# Patient Record
Sex: MALE | Race: WHITE | NOT HISPANIC OR LATINO | Employment: OTHER | URBAN - METROPOLITAN AREA
[De-identification: names, ages, dates, MRNs, and addresses within clinical notes are randomized per-mention and may not be internally consistent; named-entity substitution may affect disease eponyms.]

---

## 2023-04-21 DIAGNOSIS — F41.9 ANXIETY: ICD-10-CM

## 2023-04-21 DIAGNOSIS — E78.5 HYPERLIPIDEMIA, UNSPECIFIED HYPERLIPIDEMIA TYPE: ICD-10-CM

## 2023-04-21 DIAGNOSIS — I10 HYPERTENSION, UNSPECIFIED TYPE: ICD-10-CM

## 2023-04-21 DIAGNOSIS — T78.40XS ALLERGY, SEQUELA: ICD-10-CM

## 2023-04-21 RX ORDER — ESCITALOPRAM OXALATE 20 MG/1
20 TABLET ORAL DAILY
COMMUNITY
End: 2023-04-21 | Stop reason: SDUPTHER

## 2023-04-21 RX ORDER — ATENOLOL 25 MG/1
1 TABLET ORAL DAILY
COMMUNITY
Start: 2015-02-25 | End: 2023-04-21 | Stop reason: SDUPTHER

## 2023-04-21 RX ORDER — ATENOLOL 25 MG/1
25 TABLET ORAL DAILY
Qty: 90 TABLET | Refills: 3 | Status: SHIPPED | OUTPATIENT
Start: 2023-04-21 | End: 2024-02-29 | Stop reason: SDUPTHER

## 2023-04-21 RX ORDER — AMLODIPINE BESYLATE 10 MG/1
10 TABLET ORAL DAILY
COMMUNITY
End: 2023-04-21 | Stop reason: SDUPTHER

## 2023-04-21 RX ORDER — MONTELUKAST SODIUM 10 MG/1
10 TABLET ORAL DAILY
Qty: 90 TABLET | Refills: 3 | Status: SHIPPED | OUTPATIENT
Start: 2023-04-21 | End: 2024-02-29 | Stop reason: SDUPTHER

## 2023-04-21 RX ORDER — TRAZODONE HYDROCHLORIDE 100 MG/1
100 TABLET ORAL NIGHTLY
Qty: 90 TABLET | Refills: 3 | Status: SHIPPED | OUTPATIENT
Start: 2023-04-21 | End: 2024-02-29 | Stop reason: SDUPTHER

## 2023-04-21 RX ORDER — ATORVASTATIN CALCIUM 40 MG/1
40 TABLET, FILM COATED ORAL DAILY
COMMUNITY
End: 2023-04-21 | Stop reason: SDUPTHER

## 2023-04-21 RX ORDER — TRAZODONE HYDROCHLORIDE 100 MG/1
100 TABLET ORAL NIGHTLY
COMMUNITY
End: 2023-04-21 | Stop reason: SDUPTHER

## 2023-04-21 RX ORDER — ESCITALOPRAM OXALATE 20 MG/1
20 TABLET ORAL DAILY
Qty: 90 TABLET | Refills: 3 | Status: SHIPPED | OUTPATIENT
Start: 2023-04-21 | End: 2024-02-29 | Stop reason: SDUPTHER

## 2023-04-21 RX ORDER — MONTELUKAST SODIUM 10 MG/1
10 TABLET ORAL DAILY
COMMUNITY
End: 2023-04-21 | Stop reason: SDUPTHER

## 2023-04-21 RX ORDER — ATORVASTATIN CALCIUM 40 MG/1
40 TABLET, FILM COATED ORAL DAILY
Qty: 90 TABLET | Refills: 3 | Status: SHIPPED | OUTPATIENT
Start: 2023-04-21 | End: 2024-04-20

## 2023-04-21 RX ORDER — AMLODIPINE BESYLATE 10 MG/1
10 TABLET ORAL DAILY
Qty: 90 TABLET | Refills: 3 | Status: SHIPPED | OUTPATIENT
Start: 2023-04-21 | End: 2024-02-29 | Stop reason: SDUPTHER

## 2023-05-02 ENCOUNTER — TELEPHONE (OUTPATIENT)
Dept: PRIMARY CARE | Facility: CLINIC | Age: 73
End: 2023-05-02
Payer: MEDICARE

## 2023-05-02 NOTE — TELEPHONE ENCOUNTER
Pt left a msg stating that a new script for his Losartan hydrochlorothiazide needs to be sent to [facility].

## 2023-05-03 DIAGNOSIS — I10 HYPERTENSION, UNSPECIFIED TYPE: ICD-10-CM

## 2023-05-03 RX ORDER — LOSARTAN POTASSIUM AND HYDROCHLOROTHIAZIDE 25; 100 MG/1; MG/1
1 TABLET ORAL DAILY
Qty: 30 TABLET | Refills: 11 | Status: SHIPPED | OUTPATIENT
Start: 2023-05-03 | End: 2023-08-11 | Stop reason: DRUGHIGH

## 2023-05-03 RX ORDER — LOSARTAN POTASSIUM AND HYDROCHLOROTHIAZIDE 25; 100 MG/1; MG/1
1 TABLET ORAL DAILY
COMMUNITY
End: 2023-05-03 | Stop reason: SDUPTHER

## 2023-05-11 LAB
C REACTIVE PROTEIN (MG/L) IN SER/PLAS BY HIGH SENSIT: 4.4 MG/L
SEDIMENTATION RATE, ERYTHROCYTE: 2 MM/H (ref 0–20)

## 2023-07-18 LAB — URINE CULTURE: NO GROWTH

## 2023-07-21 LAB
CREATININE (MG/DL) IN SER/PLAS: 1.36 MG/DL (ref 0.5–1.3)
GFR MALE: 55 ML/MIN/1.73M2
PROSTATE SPECIFIC AG (NG/ML) IN SER/PLAS: 3.82 NG/ML (ref 0–4)
UREA NITROGEN (MG/DL) IN SER/PLAS: 27 MG/DL (ref 6–23)

## 2023-08-11 ENCOUNTER — OFFICE VISIT (OUTPATIENT)
Dept: PRIMARY CARE | Facility: CLINIC | Age: 73
End: 2023-08-11
Payer: MEDICARE

## 2023-08-11 ENCOUNTER — LAB (OUTPATIENT)
Dept: LAB | Facility: LAB | Age: 73
End: 2023-08-11
Payer: MEDICARE

## 2023-08-11 VITALS
SYSTOLIC BLOOD PRESSURE: 118 MMHG | WEIGHT: 238.8 LBS | TEMPERATURE: 97.6 F | DIASTOLIC BLOOD PRESSURE: 74 MMHG | BODY MASS INDEX: 29.85 KG/M2

## 2023-08-11 DIAGNOSIS — E55.9 MILD VITAMIN D DEFICIENCY: ICD-10-CM

## 2023-08-11 DIAGNOSIS — N18.31 STAGE 3A CHRONIC KIDNEY DISEASE (MULTI): ICD-10-CM

## 2023-08-11 DIAGNOSIS — E78.5 HYPERLIPIDEMIA, UNSPECIFIED HYPERLIPIDEMIA TYPE: ICD-10-CM

## 2023-08-11 DIAGNOSIS — K62.5 RECTAL BLEEDING: ICD-10-CM

## 2023-08-11 DIAGNOSIS — R73.01 IMPAIRED FASTING BLOOD SUGAR: ICD-10-CM

## 2023-08-11 DIAGNOSIS — M25.559 HIP PAIN, UNSPECIFIED LATERALITY: Primary | ICD-10-CM

## 2023-08-11 DIAGNOSIS — Z00.00 ROUTINE CHECK-UP: ICD-10-CM

## 2023-08-11 PROBLEM — I10 HYPERTENSION: Status: ACTIVE | Noted: 2017-12-21

## 2023-08-11 PROBLEM — Z98.890 S/P ROTATOR CUFF REPAIR: Status: ACTIVE | Noted: 2023-08-11

## 2023-08-11 PROBLEM — K43.9 VENTRAL HERNIA WITHOUT OBSTRUCTION OR GANGRENE: Status: ACTIVE | Noted: 2023-08-11

## 2023-08-11 PROBLEM — J30.2 SEASONAL ALLERGIES: Status: ACTIVE | Noted: 2023-08-11

## 2023-08-11 PROBLEM — F33.9 RECURRENT MAJOR DEPRESSIVE DISORDER (CMS-HCC): Status: ACTIVE | Noted: 2023-08-11

## 2023-08-11 PROBLEM — N40.0 BPH (BENIGN PROSTATIC HYPERPLASIA): Status: ACTIVE | Noted: 2017-12-21

## 2023-08-11 PROBLEM — N13.8 BPH WITH OBSTRUCTION/LOWER URINARY TRACT SYMPTOMS: Status: ACTIVE | Noted: 2023-08-11

## 2023-08-11 PROBLEM — I25.10 CAD (CORONARY ARTERY DISEASE): Status: ACTIVE | Noted: 2017-12-21

## 2023-08-11 PROBLEM — R97.20 ELEVATED PSA: Status: ACTIVE | Noted: 2023-08-11

## 2023-08-11 PROBLEM — S72.001D FRACTURE OF UNSPECIFIED PART OF NECK OF RIGHT FEMUR, SUBSEQUENT ENCOUNTER FOR CLOSED FRACTURE WITH ROUTINE HEALING: Status: ACTIVE | Noted: 2017-12-21

## 2023-08-11 PROBLEM — R51.9 HEADACHE: Status: ACTIVE | Noted: 2023-08-11

## 2023-08-11 PROBLEM — N40.1 BPH WITH OBSTRUCTION/LOWER URINARY TRACT SYMPTOMS: Status: ACTIVE | Noted: 2023-08-11

## 2023-08-11 PROBLEM — N18.2 CHRONIC KIDNEY DISEASE, STAGE 2 (MILD): Status: ACTIVE | Noted: 2023-08-11

## 2023-08-11 PROBLEM — N18.2 CHRONIC KIDNEY DISEASE, STAGE 2 (MILD): Status: RESOLVED | Noted: 2023-08-11 | Resolved: 2023-08-11

## 2023-08-11 PROBLEM — E83.52 HYPERCALCEMIA: Status: ACTIVE | Noted: 2023-08-11

## 2023-08-11 LAB
ALANINE AMINOTRANSFERASE (SGPT) (U/L) IN SER/PLAS: 38 U/L (ref 10–52)
ANION GAP IN SER/PLAS: 14 MMOL/L (ref 10–20)
CALCIUM (MG/DL) IN SER/PLAS: 9.8 MG/DL (ref 8.6–10.3)
CARBON DIOXIDE, TOTAL (MMOL/L) IN SER/PLAS: 27 MMOL/L (ref 21–32)
CHLORIDE (MMOL/L) IN SER/PLAS: 105 MMOL/L (ref 98–107)
CREATININE (MG/DL) IN SER/PLAS: 1.29 MG/DL (ref 0.5–1.3)
ERYTHROCYTE DISTRIBUTION WIDTH (RATIO) BY AUTOMATED COUNT: 13.8 % (ref 11.5–14.5)
ERYTHROCYTE MEAN CORPUSCULAR HEMOGLOBIN CONCENTRATION (G/DL) BY AUTOMATED: 33.5 G/DL (ref 32–36)
ERYTHROCYTE MEAN CORPUSCULAR VOLUME (FL) BY AUTOMATED COUNT: 95 FL (ref 80–100)
ERYTHROCYTES (10*6/UL) IN BLOOD BY AUTOMATED COUNT: 4.6 X10E12/L (ref 4.5–5.9)
ESTIMATED AVERAGE GLUCOSE FOR HBA1C: 126 MG/DL
GFR MALE: 58 ML/MIN/1.73M2
GLUCOSE (MG/DL) IN SER/PLAS: 83 MG/DL (ref 74–99)
HEMATOCRIT (%) IN BLOOD BY AUTOMATED COUNT: 43.6 % (ref 41–52)
HEMOGLOBIN (G/DL) IN BLOOD: 14.6 G/DL (ref 13.5–17.5)
HEMOGLOBIN A1C/HEMOGLOBIN TOTAL IN BLOOD: 6 %
LEUKOCYTES (10*3/UL) IN BLOOD BY AUTOMATED COUNT: 6.1 X10E9/L (ref 4.4–11.3)
PLATELETS (10*3/UL) IN BLOOD AUTOMATED COUNT: 190 X10E9/L (ref 150–450)
POTASSIUM (MMOL/L) IN SER/PLAS: 4.1 MMOL/L (ref 3.5–5.3)
SODIUM (MMOL/L) IN SER/PLAS: 142 MMOL/L (ref 136–145)
UREA NITROGEN (MG/DL) IN SER/PLAS: 32 MG/DL (ref 6–23)

## 2023-08-11 PROCEDURE — 83036 HEMOGLOBIN GLYCOSYLATED A1C: CPT

## 2023-08-11 PROCEDURE — G0439 PPPS, SUBSEQ VISIT: HCPCS | Performed by: INTERNAL MEDICINE

## 2023-08-11 PROCEDURE — 3074F SYST BP LT 130 MM HG: CPT | Performed by: INTERNAL MEDICINE

## 2023-08-11 PROCEDURE — 3078F DIAST BP <80 MM HG: CPT | Performed by: INTERNAL MEDICINE

## 2023-08-11 PROCEDURE — 1170F FXNL STATUS ASSESSED: CPT | Performed by: INTERNAL MEDICINE

## 2023-08-11 PROCEDURE — 1036F TOBACCO NON-USER: CPT | Performed by: INTERNAL MEDICINE

## 2023-08-11 PROCEDURE — 1126F AMNT PAIN NOTED NONE PRSNT: CPT | Performed by: INTERNAL MEDICINE

## 2023-08-11 PROCEDURE — 36415 COLL VENOUS BLD VENIPUNCTURE: CPT

## 2023-08-11 PROCEDURE — 1159F MED LIST DOCD IN RCRD: CPT | Performed by: INTERNAL MEDICINE

## 2023-08-11 PROCEDURE — 84460 ALANINE AMINO (ALT) (SGPT): CPT

## 2023-08-11 PROCEDURE — 99213 OFFICE O/P EST LOW 20 MIN: CPT | Performed by: INTERNAL MEDICINE

## 2023-08-11 PROCEDURE — 85027 COMPLETE CBC AUTOMATED: CPT

## 2023-08-11 PROCEDURE — 80048 BASIC METABOLIC PNL TOTAL CA: CPT

## 2023-08-11 PROCEDURE — 1160F RVW MEDS BY RX/DR IN RCRD: CPT | Performed by: INTERNAL MEDICINE

## 2023-08-11 RX ORDER — CHOLECALCIFEROL (VITAMIN D3) 50 MCG
1 TABLET ORAL DAILY
COMMUNITY
End: 2024-02-28 | Stop reason: WASHOUT

## 2023-08-11 RX ORDER — MIRABEGRON 50 MG/1
TABLET, FILM COATED, EXTENDED RELEASE ORAL
COMMUNITY
Start: 2022-03-15

## 2023-08-11 RX ORDER — LOSARTAN POTASSIUM AND HYDROCHLOROTHIAZIDE 12.5; 1 MG/1; MG/1
1 TABLET ORAL DAILY
COMMUNITY
End: 2023-12-13 | Stop reason: WASHOUT

## 2023-08-11 RX ORDER — ASPIRIN 325 MG
TABLET, DELAYED RELEASE (ENTERIC COATED) ORAL ONCE
COMMUNITY
Start: 2015-02-25 | End: 2024-02-28 | Stop reason: WASHOUT

## 2023-08-11 RX ORDER — CARBAMAZEPINE 100 MG/1
TABLET, CHEWABLE ORAL DAILY
COMMUNITY
Start: 2022-01-27 | End: 2024-02-29 | Stop reason: SDUPTHER

## 2023-08-11 RX ORDER — ASPIRIN 81 MG/1
81 TABLET ORAL DAILY
COMMUNITY

## 2023-08-11 RX ORDER — ACETAMINOPHEN 500 MG
TABLET ORAL 2 TIMES DAILY
COMMUNITY

## 2023-08-11 ASSESSMENT — ACTIVITIES OF DAILY LIVING (ADL)
DOING_HOUSEWORK: INDEPENDENT
TAKING_MEDICATION: INDEPENDENT
GROCERY_SHOPPING: INDEPENDENT
DRESSING: INDEPENDENT
BATHING: INDEPENDENT
MANAGING_FINANCES: INDEPENDENT

## 2023-08-11 ASSESSMENT — PATIENT HEALTH QUESTIONNAIRE - PHQ9
SUM OF ALL RESPONSES TO PHQ9 QUESTIONS 1 AND 2: 0
2. FEELING DOWN, DEPRESSED OR HOPELESS: NOT AT ALL
2. FEELING DOWN, DEPRESSED OR HOPELESS: SEVERAL DAYS
1. LITTLE INTEREST OR PLEASURE IN DOING THINGS: NOT AT ALL
1. LITTLE INTEREST OR PLEASURE IN DOING THINGS: SEVERAL DAYS
SUM OF ALL RESPONSES TO PHQ9 QUESTIONS 1 AND 2: 2

## 2023-08-11 NOTE — PROGRESS NOTES
Subjective   Reason for Visit: Adebayo Puentes is an 73 y.o. male here for a Medicare Wellness visit.     Past Medical, Surgical, and Family History reviewed and updated in chart.    Reviewed all medications by prescribing practitioner or clinical pharmacist (such as prescriptions, OTCs, herbal therapies and supplements) and documented in the medical record.    HPI  Overall well   Noted a few weeks w/ red blood in water/on tissue.  No pain.  No bleeding x ~3-4 weeks.  Last colo 2021  #1 ventral hernia-status post repair , no pain  #2 CAD- no CP  #3 HTN- no HA, CP, dizziness  #4 renal stones/BPH/cysts- no issues  #5 lipids-good. Recheck   #6 CKD stage 3 - f/u nephrology. Reviewed no NSAIDs.   #7 hearing loss- stable. f/u ENT  #8 RC- s/p surgery. doing well. f/u ortho.   #9 hip fracture- resolved.   #10 globus sensation- s/p ENT. f/u ENT  #11 fatigue- better  #12 depression- good. con't Rx.   #13 fall- balance much better, reviewed  #14 neck mass- resolved. f/u ENT PRN.   #15 insomnia/mood- good, con't rx.   #16 BPH- Follow-up urology   #17 TN- improved. s/p gamma-knife. con't lyrica, f/u neuro.  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 diet/exercise reviewed. retest ha1c.    Patient Care Team:  Mayda Carter MD as PCP - General  Mayda Carter MD as PCP - [facility] Attributed Provider     Review of Systems   All other systems reviewed and are negative.      Objective   Vitals:  /74   Temp 36.4 °C (97.6 °F)   Wt 108 kg (238 lb 12.8 oz)   BMI 29.85 kg/m²       Physical Exam  Constitutional:       Appearance: Normal appearance.   Cardiovascular:      Rate and Rhythm: Normal rate and regular rhythm.      Pulses: Normal pulses.      Heart sounds: No murmur heard.     No gallop.   Pulmonary:      Effort: Pulmonary effort is normal. No respiratory distress.      Breath sounds: Normal breath sounds. No wheezing, rhonchi or rales.   Neurological:      Mental Status: He is alert.   Psychiatric:         Mood and Affect: Mood  normal.         Behavior: Behavior normal.         Thought Content: Thought content normal.         Judgment: Judgment normal.       Lab Results   Component Value Date    WBC 7.9 02/24/2023    HGB 14.1 02/24/2023    HCT 41.1 02/24/2023     02/24/2023    CHOL 124 02/13/2023    TRIG 87 02/13/2023    HDL 41.6 02/13/2023    ALT 31 02/24/2023    AST 23 02/24/2023     02/24/2023    K 3.9 02/24/2023     02/24/2023    CREATININE 1.36 (H) 07/21/2023    BUN 27 (H) 07/21/2023    CO2 31 02/24/2023    TSH 3.32 08/18/2022    PSA 3.82 07/21/2023    INR 1.2 (H) 02/24/2023    HGBA1C 5.8 (A) 02/13/2023       Assessment/Plan   Problem List Items Addressed This Visit    None  #1 ventral hernia-status post repair doing well. f/u surgery PRN  #2 CAD- class 1 dz. neg stress last. episode of non-acrdiac CP, neg eval 2/22. Clinically stable. f//u w/ Dr Howell- qyear.  #3 HTN- good. con't rx. f/u nephrology.  #4 renal stones/BPH/cysts-stable. f/u , appt pending  #5 lipids-good. Recheck 6 mths  #6 CKD stage 3 - f/u nephrology. Reviewed no NSAIDs.   #7 hearing loss- stable. f/u ENT  #8 RC- s/p surgery. doing well. f/u ortho.   #9 hip fracture- resolved.   #10 globus sensation- s/p ENT. f/u ENT  #11 fatigue- better  #12 depression- good. con't Rx.   #13 fall- balance much better, reviewed  #14 neck mass- resolved. f/u ENT PRN.   #15 insomnia/mood- good, con't rx.   #16 BPH- Follow-up urology   #17 TN- improved. s/p gamma-knife. con't lyrica, f/u neuro.  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 diet/exercise reviewed. retest ha1c.  #19 rectal bleeding- resolved.  Suspect hemorrhoids.  Check cbc, c/s GI     last colo 9/21--> is due for colonoscopy 2026

## 2023-08-15 ENCOUNTER — TELEPHONE (OUTPATIENT)
Dept: PRIMARY CARE | Facility: CLINIC | Age: 73
End: 2023-08-15
Payer: MEDICARE

## 2023-08-15 NOTE — TELEPHONE ENCOUNTER
Pt left a msg for you stating that he mis-spoke about his Losartan hydrochlorothiazide.  The hydrochlorothiazide is not 12.5mg, it is 25mg.

## 2023-08-16 DIAGNOSIS — I10 HYPERTENSION, UNSPECIFIED TYPE: ICD-10-CM

## 2023-08-16 RX ORDER — LOSARTAN POTASSIUM AND HYDROCHLOROTHIAZIDE 25; 100 MG/1; MG/1
1 TABLET ORAL DAILY
Qty: 30 TABLET | Refills: 11 | Status: SHIPPED | OUTPATIENT
Start: 2023-08-16 | End: 2024-08-15

## 2023-08-17 ENCOUNTER — TELEPHONE (OUTPATIENT)
Dept: PRIMARY CARE | Facility: CLINIC | Age: 73
End: 2023-08-17
Payer: MEDICARE

## 2023-08-17 NOTE — TELEPHONE ENCOUNTER
Pt left a msg stating that he has not been able to get a hold of Dr. Coelho's office since Monday.  The answering service told him they don't take msg's.  He has call during office hours and is getting no one.  Doesn't know if he is on vacation or what.  He is very upset and impatient.

## 2023-09-08 ENCOUNTER — HOSPITAL ENCOUNTER (OUTPATIENT)
Dept: DATA CONVERSION | Facility: HOSPITAL | Age: 73
Discharge: HOME | End: 2023-09-08

## 2023-09-08 DIAGNOSIS — R79.1 ABNORMAL COAGULATION PROFILE: ICD-10-CM

## 2023-09-08 DIAGNOSIS — Z01.818 ENCOUNTER FOR OTHER PREPROCEDURAL EXAMINATION: ICD-10-CM

## 2023-09-08 DIAGNOSIS — R97.20 ELEVATED PROSTATE SPECIFIC ANTIGEN (PSA): ICD-10-CM

## 2023-09-08 LAB
ALBUMIN SERPL-MCNC: 4.6 GM/DL (ref 3.5–5)
ALBUMIN/GLOB SERPL: 1.5 RATIO (ref 1.5–3)
ALP BLD-CCNC: 117 U/L (ref 35–125)
ALT SERPL-CCNC: 34 U/L (ref 5–40)
ANION GAP SERPL CALCULATED.3IONS-SCNC: 11 MMOL/L (ref 0–19)
ANTICOAGULANT: NORMAL
APTT PPP: 25.1 SEC (ref 22–32.5)
AST SERPL-CCNC: 34 U/L (ref 5–40)
BASOPHILS # BLD AUTO: 0.03 K/UL (ref 0–0.22)
BASOPHILS NFR BLD AUTO: 0.4 % (ref 0–1)
BILIRUB SERPL-MCNC: 0.6 MG/DL (ref 0.1–1.2)
BILIRUB UR QL STRIP.AUTO: NEGATIVE
BUN SERPL-MCNC: 31 MG/DL (ref 8–25)
BUN/CREAT SERPL: 18.2 RATIO (ref 8–21)
CALCIUM SERPL-MCNC: 10.5 MG/DL (ref 8.5–10.4)
CHLORIDE SERPL-SCNC: 105 MMOL/L (ref 97–107)
CLARITY UR: CLEAR
CO2 SERPL-SCNC: 28 MMOL/L (ref 24–31)
COLOR UR: YELLOW
CREAT SERPL-MCNC: 1.7 MG/DL (ref 0.4–1.6)
DEPRECATED RDW RBC AUTO: 46.6 FL (ref 37–54)
DIFFERENTIAL METHOD BLD: ABNORMAL
EOSINOPHIL # BLD AUTO: 0.12 K/UL (ref 0–0.45)
EOSINOPHIL NFR BLD: 1.5 % (ref 0–3)
EPITH CASTS #/AREA UR COMP ASSIST: NORMAL /HPF
ERYTHROCYTE [DISTWIDTH] IN BLOOD BY AUTOMATED COUNT: 13.2 % (ref 11.7–15)
GFR SERPL CREATININE-BSD FRML MDRD: 42 ML/MIN/1.73 M2
GLOBULIN SER-MCNC: 3 G/DL (ref 1.9–3.7)
GLUCOSE SERPL-MCNC: 83 MG/DL (ref 65–99)
GLUCOSE UR STRIP.AUTO-MCNC: NEGATIVE MG/DL
HCT VFR BLD AUTO: 43.1 % (ref 41–50)
HGB BLD-MCNC: 14.2 GM/DL (ref 13.5–16.5)
HGB UR QL STRIP.AUTO: NORMAL /HPF (ref 0–3)
HGB UR QL: NEGATIVE
IMM GRANULOCYTES # BLD AUTO: 0.01 K/UL (ref 0–0.1)
INR PPP: 1 (ref 0.86–1.16)
KETONES UR QL STRIP.AUTO: NEGATIVE
LEUKOCYTE ESTERASE UR QL STRIP.AUTO: NEGATIVE
LYMPHOCYTES # BLD AUTO: 1.71 K/UL (ref 1.2–3.2)
LYMPHOCYTES NFR BLD MANUAL: 21.8 % (ref 20–40)
MCH RBC QN AUTO: 31.3 PG (ref 26–34)
MCHC RBC AUTO-ENTMCNC: 32.9 % (ref 31–37)
MCV RBC AUTO: 95.1 FL (ref 80–100)
MICRO URNS: NORMAL
MICROSCOPIC (UA): NORMAL
MONOCYTES # BLD AUTO: 0.81 K/UL (ref 0–0.8)
MONOCYTES NFR BLD MANUAL: 10.3 % (ref 0–8)
NEUTROPHILS # BLD AUTO: 5.15 K/UL
NEUTROPHILS # BLD AUTO: 5.15 K/UL (ref 1.8–7.7)
NEUTROPHILS.IMMATURE NFR BLD: 0.1 % (ref 0–1)
NEUTS SEG NFR BLD: 65.9 % (ref 50–70)
NITRITE UR QL STRIP.AUTO: NEGATIVE
PH UR STRIP.AUTO: 5.5 [PH] (ref 4.6–8)
PLATELET # BLD AUTO: 194 K/UL (ref 150–450)
PMV BLD AUTO: 9.5 CU (ref 7–12.6)
POTASSIUM SERPL-SCNC: 4.2 MMOL/L (ref 3.4–5.1)
PROT SERPL-MCNC: 7.6 G/DL (ref 5.9–7.9)
PROT UR STRIP.AUTO-MCNC: NEGATIVE MG/DL
PROTHROMBIN TIME: 9.9 SEC (ref 9.3–12.7)
RBC # BLD AUTO: 4.53 M/UL (ref 4.5–5.5)
SODIUM SERPL-SCNC: 144 MMOL/L (ref 133–145)
SP GR UR STRIP.AUTO: 1.01 (ref 1–1.03)
URINE CULTURE: NORMAL
UROBILINOGEN UR QL STRIP.AUTO: 0.2 MG/DL (ref 0–1)
WBC # BLD AUTO: 7.8 K/UL (ref 4.5–11)
WBC #/AREA URNS AUTO: NORMAL /HPF (ref 0–3)

## 2023-09-12 ENCOUNTER — HOSPITAL ENCOUNTER (OUTPATIENT)
Dept: DATA CONVERSION | Facility: HOSPITAL | Age: 73
Discharge: HOME | End: 2023-09-12

## 2023-09-12 DIAGNOSIS — K21.9 GASTRO-ESOPHAGEAL REFLUX DISEASE WITHOUT ESOPHAGITIS: ICD-10-CM

## 2023-09-12 DIAGNOSIS — C61 MALIGNANT NEOPLASM OF PROSTATE (MULTI): ICD-10-CM

## 2023-09-12 DIAGNOSIS — Z79.82 LONG TERM (CURRENT) USE OF ASPIRIN: ICD-10-CM

## 2023-09-12 DIAGNOSIS — R97.20 ELEVATED PROSTATE SPECIFIC ANTIGEN (PSA): ICD-10-CM

## 2023-09-12 DIAGNOSIS — I10 ESSENTIAL (PRIMARY) HYPERTENSION: ICD-10-CM

## 2023-09-27 ENCOUNTER — TELEPHONE (OUTPATIENT)
Dept: PRIMARY CARE | Facility: CLINIC | Age: 73
End: 2023-09-27
Payer: MEDICARE

## 2023-09-27 NOTE — TELEPHONE ENCOUNTER
Pt left a msg stating that he is leaving for [facility] in October, and is looking to see if he has had a yellow fever shot, He was also asking if there are any other vaccines he will need.

## 2023-10-11 DIAGNOSIS — Z00.00 ROUTINE GENERAL MEDICAL EXAMINATION AT A HEALTH CARE FACILITY: Primary | ICD-10-CM

## 2023-10-12 NOTE — TELEPHONE ENCOUNTER
Pt called again, stating they are leaving for [facility] on 10/20.  He said that his last yellow fever was 11 yrs ago, and wanted to know if he needs a booster.  He is also needing a script for the Malaria pills.  [facility] has ordered them but they will need a script.  He also wanted to know if there was anything else he needed.  He has been in contact with the health dept.

## 2023-10-13 PROBLEM — N28.1 RENAL CYST: Status: ACTIVE | Noted: 2023-10-13

## 2023-10-13 PROBLEM — K21.9 GERD (GASTROESOPHAGEAL REFLUX DISEASE): Status: ACTIVE | Noted: 2023-10-13

## 2023-10-13 PROBLEM — G50.0 RIGHT TRIGEMINAL NEURALGIA: Status: ACTIVE | Noted: 2023-10-13

## 2023-10-13 PROBLEM — R26.89 IMBALANCE: Status: ACTIVE | Noted: 2023-10-13

## 2023-10-13 PROBLEM — R26.2 DIFFICULTY IN WALKING, NOT ELSEWHERE CLASSIFIED: Status: ACTIVE | Noted: 2017-12-21

## 2023-10-13 PROBLEM — H90.3 BILATERAL SENSORINEURAL HEARING LOSS: Status: ACTIVE | Noted: 2023-10-13

## 2023-10-13 PROBLEM — K64.9 HEMORRHOIDS: Status: ACTIVE | Noted: 2023-10-13

## 2023-10-13 PROBLEM — M62.81 MUSCLE WEAKNESS (GENERALIZED): Status: ACTIVE | Noted: 2017-12-21

## 2023-10-13 PROBLEM — I10 ESSENTIAL (PRIMARY) HYPERTENSION: Status: ACTIVE | Noted: 2023-10-13

## 2023-10-13 PROBLEM — K21.9 LARYNGOPHARYNGEAL REFLUX (LPR): Status: ACTIVE | Noted: 2023-10-13

## 2023-10-13 PROBLEM — R97.20 RISING PSA LEVEL: Status: ACTIVE | Noted: 2023-10-13

## 2023-10-13 PROBLEM — G60.9 IDIOPATHIC PERIPHERAL NEUROPATHY: Status: ACTIVE | Noted: 2023-10-13

## 2023-10-13 PROBLEM — S32.401A: Status: ACTIVE | Noted: 2023-10-13

## 2023-10-13 PROBLEM — M75.121 COMPLETE TEAR OF RIGHT ROTATOR CUFF: Status: ACTIVE | Noted: 2023-10-13

## 2023-10-13 PROBLEM — R79.89 ELEVATED SERUM CREATININE: Status: ACTIVE | Noted: 2023-10-13

## 2023-10-13 PROBLEM — F32.9 MAJOR DEPRESSIVE DISORDER, SINGLE EPISODE, UNSPECIFIED: Status: ACTIVE | Noted: 2017-12-21

## 2023-10-13 PROBLEM — F32.A DEPRESSION: Status: ACTIVE | Noted: 2023-10-13

## 2023-10-13 PROBLEM — N20.0 NEPHROLITHIASIS: Status: ACTIVE | Noted: 2023-10-13

## 2023-10-13 PROBLEM — M24.411 RECURRENT SHOULDER DISLOCATION, RIGHT: Status: ACTIVE | Noted: 2023-10-13

## 2023-10-13 PROBLEM — J34.3 HYPERTROPHY OF INFERIOR NASAL TURBINATE: Status: ACTIVE | Noted: 2023-10-13

## 2023-10-13 PROBLEM — C61 PROSTATE CANCER (MULTI): Status: ACTIVE | Noted: 2023-10-13

## 2023-10-13 PROBLEM — R39.198 ABNORMAL URINARY STREAM: Status: ACTIVE | Noted: 2023-10-13

## 2023-10-13 PROBLEM — R09.A2 GLOBUS SENSATION: Status: ACTIVE | Noted: 2023-10-13

## 2023-10-13 PROBLEM — R09.89 CHRONIC THROAT CLEARING: Status: ACTIVE | Noted: 2023-10-13

## 2023-10-13 PROBLEM — G50.0 TRIGEMINUS NEURALGIA: Status: ACTIVE | Noted: 2023-10-13

## 2023-10-13 PROBLEM — N52.9 ERECTILE DYSFUNCTION: Status: ACTIVE | Noted: 2023-10-13

## 2023-10-13 PROBLEM — R22.1 NECK MASS: Status: ACTIVE | Noted: 2023-10-13

## 2023-10-13 PROBLEM — E10.42: Status: ACTIVE | Noted: 2023-10-13

## 2023-10-13 PROBLEM — H92.01 OTALGIA, RIGHT: Status: ACTIVE | Noted: 2023-10-13

## 2023-10-13 PROBLEM — R05.3 PERSISTENT COUGH: Status: ACTIVE | Noted: 2023-10-13

## 2023-10-13 PROBLEM — S32.409A ACETABULAR FRACTURE (MULTI): Status: ACTIVE | Noted: 2023-10-13

## 2023-10-13 PROBLEM — R13.10 DYSPHAGIA: Status: ACTIVE | Noted: 2023-10-13

## 2023-10-13 PROBLEM — I25.10 ATHEROSCLEROTIC HEART DISEASE OF NATIVE CORONARY ARTERY WITHOUT ANGINA PECTORIS: Status: ACTIVE | Noted: 2023-10-13

## 2023-10-13 PROBLEM — E78.2 MIXED HYPERLIPIDEMIA: Status: ACTIVE | Noted: 2023-10-13

## 2023-10-13 PROBLEM — H92.20 HEMORRHAGE OF EAR CANAL: Status: ACTIVE | Noted: 2023-10-13

## 2023-10-13 PROBLEM — R39.15 URINARY URGENCY: Status: ACTIVE | Noted: 2023-10-13

## 2023-10-13 RX ORDER — MULTIVIT-MIN/FOLIC ACID/LUTEIN 400-250MCG
1 TABLET,CHEWABLE ORAL DAILY
COMMUNITY

## 2023-10-13 RX ORDER — HYDROGEN PEROXIDE 3 %
20 SOLUTION, NON-ORAL MISCELLANEOUS DAILY
COMMUNITY

## 2023-10-13 RX ORDER — ACETAMINOPHEN 500 MG
1 TABLET ORAL
COMMUNITY

## 2023-10-13 RX ORDER — DUTASTERIDE 0.5 MG/1
0.5 CAPSULE, LIQUID FILLED ORAL DAILY
COMMUNITY
Start: 2011-05-11 | End: 2024-03-01 | Stop reason: WASHOUT

## 2023-10-13 RX ORDER — HYDROCORTISONE ACETATE 25 MG/1
25 SUPPOSITORY RECTAL NIGHTLY PRN
COMMUNITY
End: 2024-02-28 | Stop reason: WASHOUT

## 2023-10-13 RX ORDER — CEPHALEXIN 500 MG/1
500 CAPSULE ORAL
COMMUNITY
Start: 2023-09-19 | End: 2023-10-16 | Stop reason: ALTCHOICE

## 2023-10-13 RX ORDER — OSELTAMIVIR PHOSPHATE 75 MG/1
1 CAPSULE ORAL 2 TIMES DAILY
COMMUNITY
Start: 2023-02-24 | End: 2023-10-16 | Stop reason: ALTCHOICE

## 2023-10-13 NOTE — TELEPHONE ENCOUNTER
Pt dropped off a paper with the script of the Malaria pills he needs.  They are Atovaquone-Proguanil 250/100 tabs, #18, take 1 tablet by mouth daily starting 2 days before travel, during stay, and for another week after return.  Pharm is [facility].

## 2023-10-16 ENCOUNTER — OFFICE VISIT (OUTPATIENT)
Dept: GASTROENTEROLOGY | Facility: CLINIC | Age: 73
End: 2023-10-16
Payer: MEDICARE

## 2023-10-16 VITALS — BODY MASS INDEX: 33.18 KG/M2 | WEIGHT: 237 LBS | HEIGHT: 71 IN | HEART RATE: 64 BPM

## 2023-10-16 DIAGNOSIS — Z29.9 PROPHYLACTIC MEASURE: ICD-10-CM

## 2023-10-16 DIAGNOSIS — K62.5 RECTAL BLEEDING: Primary | ICD-10-CM

## 2023-10-16 PROCEDURE — 1036F TOBACCO NON-USER: CPT | Performed by: INTERNAL MEDICINE

## 2023-10-16 PROCEDURE — 3044F HG A1C LEVEL LT 7.0%: CPT | Performed by: INTERNAL MEDICINE

## 2023-10-16 PROCEDURE — 1160F RVW MEDS BY RX/DR IN RCRD: CPT | Performed by: INTERNAL MEDICINE

## 2023-10-16 PROCEDURE — 99213 OFFICE O/P EST LOW 20 MIN: CPT | Performed by: INTERNAL MEDICINE

## 2023-10-16 PROCEDURE — 1159F MED LIST DOCD IN RCRD: CPT | Performed by: INTERNAL MEDICINE

## 2023-10-16 PROCEDURE — 1126F AMNT PAIN NOTED NONE PRSNT: CPT | Performed by: INTERNAL MEDICINE

## 2023-10-16 RX ORDER — ATOVAQUONE AND PROGUANIL HYDROCHLORIDE 250; 100 MG/1; MG/1
TABLET, FILM COATED ORAL
Qty: 18 TABLET | Refills: 0 | Status: SHIPPED | OUTPATIENT
Start: 2023-10-16 | End: 2023-12-13 | Stop reason: WASHOUT

## 2023-10-16 NOTE — PROGRESS NOTES
Subjective     History of Present Illness:   Ed TATYANA Puentes is a 73 y.o. male with PMHx of rectal bleeding who presents to GI clinic for follow up.   No bleeding at all since last visit, none since May.  Fewer hard stools, now usually easy to pass.     Review of Systems  Review of Systems    Allergies  No Known Allergies    Medications  Current Outpatient Medications   Medication Instructions    amLODIPine (NORVASC) 10 mg, oral, Daily    Anucort-HC 25 mg, rectal, Nightly PRN    ascorbic acid, vitamin C, 1,000 mg ER tablet oral    aspirin 325 mg EC tablet oral, Once    aspirin 81 mg, oral, Daily    atenolol (TENORMIN) 25 mg, oral, Daily    atorvastatin (LIPITOR) 40 mg, oral, Daily    atovaquone-proguaniL (Malarone) 250-100 mg tablet Take 1 tablet daily starting 2 days prior to trip then daily until rx is gone    calcium carbonate-vitamin D3 (Caltrate with Vitamin D3) 600 mg-20 mcg (800 unit) tablet oral, 2 times daily    carBAMazepine (TEGretol) 100 mg chewable tablet oral, Daily    cholecalciferol (Vitamin D-3) 50 MCG (2000 UT) tablet 1 tablet, oral, Daily    cholecalciferol (Vitamin D3) 5,000 Units tablet 1 tablet, oral, Weekly    dutasteride (AVODART) 0.5 mg, oral, Daily    escitalopram (LEXAPRO) 20 mg, oral, Daily    esomeprazole (NEXIUM) 20 mg, oral, Daily, Do not open capsule.    losartan-hydrochlorothiazide (Hyzaar) 100-12.5 mg tablet 1 tablet, oral, Daily    losartan-hydrochlorothiazide (Hyzaar) 100-25 mg tablet 1 tablet, oral, Daily    montelukast (SINGULAIR) 10 mg, oral, Daily    mv-min-folic acid-lutein (Centrum Silver) 400-250 mcg tablet,chewable 1 tablet, oral, Daily    Myrbetriq 50 mg tablet extended release 24 hr 24 hr tablet oral    traZODone (DESYREL) 100 mg, oral, Nightly        Objective   Visit Vitals  Pulse 64    118/72  Physical Exam  Lungs clear  CV  rrr, no mrg  Abd - soft, nontender     Lab Results   Component Value Date    WBC 6.1 08/11/2023    WBC 7.9 02/24/2023    WBC 4.7 08/18/2022    HGB  14.6 08/11/2023    HGB 14.1 02/24/2023    HGB 13.7 08/18/2022    HCT 43.6 08/11/2023    HCT 41.1 02/24/2023    HCT 40.9 (L) 08/18/2022     08/11/2023     02/24/2023     08/18/2022     Lab Results   Component Value Date     08/11/2023     02/24/2023     02/13/2023    K 4.1 08/11/2023    K 3.9 02/24/2023    K 4.0 02/13/2023     08/11/2023     02/24/2023     02/13/2023    CO2 27 08/11/2023    CO2 31 02/24/2023    CO2 32 02/13/2023    BUN 32 (H) 08/11/2023    BUN 27 (H) 07/21/2023    BUN 24 (H) 02/24/2023    CREATININE 1.29 08/11/2023    CREATININE 1.36 (H) 07/21/2023    CREATININE 1.51 (H) 02/24/2023    CALCIUM 9.8 08/11/2023    CALCIUM 9.3 02/24/2023    CALCIUM 9.9 02/13/2023    PROT 7.4 02/24/2023    PROT 7.1 12/09/2022    PROT 6.9 05/19/2022    BILITOT 0.5 02/24/2023    BILITOT 0.8 12/09/2022    BILITOT 0.4 05/19/2022    ALKPHOS 107 02/24/2023    ALKPHOS 100 12/09/2022    ALKPHOS 98 05/19/2022    ALT 38 08/11/2023    ALT 31 02/24/2023    ALT 28 12/09/2022    AST 23 02/24/2023    AST 23 12/09/2022    AST 21 05/19/2022    GLUCOSE 83 08/11/2023    GLUCOSE 99 02/24/2023    GLUCOSE 83 02/13/2023    CHOL 124 02/13/2023    CHOL 124 08/18/2022    CHOL 119 04/12/2022    LDLF 65 02/13/2023    LDLF 64 08/18/2022    LDLF 64 04/12/2022    CHHDL 3.0 02/13/2023    CHHDL 3.3 08/18/2022    CHHDL 2.9 04/12/2022             Sachin Puentes is a 73 y.o. male for follow up of rectal bleeding.   No bleeding for months.   Had hemorrhoids on 2021 colonoscopy.  He will keep stools soft and easy to pass.  He will call me if bleeding recurs in any way.       Eliazar Coelho MD

## 2023-10-30 ENCOUNTER — APPOINTMENT (OUTPATIENT)
Dept: UROLOGY | Facility: CLINIC | Age: 73
End: 2023-10-30
Payer: MEDICARE

## 2023-11-06 ENCOUNTER — PROCEDURE VISIT (OUTPATIENT)
Dept: UROLOGY | Facility: CLINIC | Age: 73
End: 2023-11-06
Payer: MEDICARE

## 2023-11-06 VITALS — HEART RATE: 61 BPM | DIASTOLIC BLOOD PRESSURE: 74 MMHG | TEMPERATURE: 96.6 F | SYSTOLIC BLOOD PRESSURE: 116 MMHG

## 2023-11-06 DIAGNOSIS — N13.8 BPH WITH OBSTRUCTION/LOWER URINARY TRACT SYMPTOMS: Primary | ICD-10-CM

## 2023-11-06 DIAGNOSIS — N40.1 BPH WITH OBSTRUCTION/LOWER URINARY TRACT SYMPTOMS: Primary | ICD-10-CM

## 2023-11-06 LAB
POC APPEARANCE, URINE: CLEAR
POC BILIRUBIN, URINE: NEGATIVE
POC BLOOD, URINE: NEGATIVE
POC COLOR, URINE: YELLOW
POC GLUCOSE, URINE: NEGATIVE MG/DL
POC KETONES, URINE: NEGATIVE MG/DL
POC LEUKOCYTES, URINE: NEGATIVE
POC NITRITE,URINE: NEGATIVE
POC PH, URINE: 7.5 PH
POC PROTEIN, URINE: NEGATIVE MG/DL
POC SPECIFIC GRAVITY, URINE: 1.02
POC UROBILINOGEN, URINE: 0.2 EU/DL

## 2023-11-06 PROCEDURE — 99214 OFFICE O/P EST MOD 30 MIN: CPT | Performed by: UROLOGY

## 2023-11-06 PROCEDURE — 81003 URINALYSIS AUTO W/O SCOPE: CPT | Performed by: UROLOGY

## 2023-11-06 PROCEDURE — 52000 CYSTOURETHROSCOPY: CPT | Performed by: UROLOGY

## 2023-11-06 NOTE — PROGRESS NOTES
Subjective   Sachin Puentes is a 73 y.o. old male with a history of TURP 07/2020 and PVP 05/05/2022. S/p TP prostate biopsy (9/12/23) with Dr. 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 History of elevated PSA. He presents today for cystoscopy to r/o [facility] vs. urethral stricture.     Objective   Past Medical History:   Diagnosis Date    Abnormal findings on diagnostic imaging of other parts of musculoskeletal system 09/21/2016    Abnormal bone radiograph    Cutaneous abscess of neck 09/23/2021    Neck abscess    Cyst of kidney, acquired 12/01/2015    Renal cyst    Old myocardial infarction     History of myocardial infarction    Old myocardial infarction 06/06/2013    Past myocardial infarction    Personal history of other diseases of the circulatory system     History of coronary artery disease    Personal history of other diseases of the circulatory system     History of high blood pressure    Personal history of other diseases of the digestive system     History of gastroesophageal reflux (GERD)    Personal history of other diseases of the digestive system     History of hiatal hernia    Personal history of other diseases of urinary system     History of kidney disease    Personal history of other mental and behavioral disorders     History of depression    Personal history of other specified conditions 09/02/2016    History of abdominal pain    Personal history of other specified conditions     History of chest pain    Personal history of other specified conditions     H/O shortness of breath     Past Surgical History:   Procedure Laterality Date    GASTRIC FUNDOPLICATION  02/25/2015    Esophagogastric Fundoplasty Nissen Fundoplication    OTHER SURGICAL HISTORY  06/11/2013    Wrist Surgery    OTHER SURGICAL HISTORY  09/23/2021    Neck surgery    OTHER SURGICAL HISTORY  09/23/2021    Transurethral resection of prostate    ROTATOR CUFF REPAIR  06/11/2013     Rotator Cuff Repair    US GUIDED ABSCESS DRAIN  2/10/2017    US GUIDED ABSCESS DRAIN 2/10/2017 [facility]    US GUIDED PERCUTANEOUS PERITONEAL OR RETROPERITONEAL FLUID COLLECTION DRAINAGE  2/10/2017    US GUIDED PERCUTANEOUS PERITONEAL OR RETROPERITONEAL FLUID COLLECTION DRAINAGE 2/10/2017 [facility]     Current Outpatient Medications on File Prior to Visit   Medication Sig Dispense Refill    amLODIPine (Norvasc) 10 mg tablet Take 1 tablet (10 mg) by mouth once daily. 90 tablet 3    Anucort-HC 25 mg suppository Insert 1 suppository (25 mg) into the rectum as needed at bedtime.      ascorbic acid, vitamin C, 1,000 mg ER tablet Take by mouth.      aspirin 325 mg EC tablet Take by mouth 1 time.      aspirin 81 mg EC tablet Take 1 tablet (81 mg) by mouth once daily.      atenolol (Tenormin) 25 mg tablet Take 1 tablet (25 mg) by mouth once daily. 90 tablet 3    atorvastatin (Lipitor) 40 mg tablet Take 1 tablet (40 mg) by mouth once daily. 90 tablet 3    atovaquone-proguaniL (Malarone) 250-100 mg tablet Take 1 tablet daily starting 2 days prior to trip then daily until rx is gone 18 tablet 0    calcium carbonate-vitamin D3 (Caltrate with Vitamin D3) 600 mg-20 mcg (800 unit) tablet Take by mouth twice a day.      carBAMazepine (TEGretol) 100 mg chewable tablet Chew early in the morning..      cholecalciferol (Vitamin D-3) 50 MCG (2000 UT) tablet Take 1 tablet (2,000 Units) by mouth once daily.      cholecalciferol (Vitamin D3) 5,000 Units tablet Take 1 tablet (5,000 Units) by mouth 1 (one) time per week.      dutasteride (Avodart) 0.5 mg capsule Take 1 capsule (0.5 mg) by mouth once daily.      escitalopram (Lexapro) 20 mg tablet Take 1 tablet (20 mg) by mouth once daily. 90 tablet 3    esomeprazole (NexIUM) 20 mg DR capsule Take 1 capsule (20 mg) by mouth once daily. Do not open capsule.      losartan-hydrochlorothiazide (Hyzaar) 100-12.5 mg tablet Take 1 tablet by mouth once daily.       losartan-hydrochlorothiazide (Hyzaar) 100-25 mg tablet Take 1 tablet by mouth once daily. 30 tablet 11    montelukast (Singulair) 10 mg tablet Take 1 tablet (10 mg) by mouth once daily. 90 tablet 3    mv-min-folic acid-lutein (Centrum Silver) 400-250 mcg tablet,chewable Chew 1 tablet once daily.      Myrbetriq 50 mg tablet extended release 24 hr 24 hr tablet Take by mouth.      traZODone (Desyrel) 100 mg tablet Take 1 tablet (100 mg) by mouth once daily at bedtime. 90 tablet 3     No current facility-administered medications on file prior to visit.     No Known Allergies    /74   Pulse 61   Temp 35.9 °C (96.6 °F)   Physical Exam    Lab Review  Urine analysis shows negative  Lab Results   Component Value Date    PSA 3.82 07/21/2023     PROCEDURE NOTE:    PREOPERATIVE DIAGNOSIS:  BPH with worsening urinary hesitancy and urgency     POSTOPERATIVE DIAGNOSIS:  Same    OPERATION:  Flexible Cystourethroscopy    ANESTHESIA:  2%  lidocaine jelly    COMPLICATIONS:  None    EBL: Minimal    DISPOSITION:  The patient was discharged home after the procedure, per routine.    INDICATIONS: :  Mr. Puentes is a 73 y.o. patient with a history of BPH with worsening urinary hesitancy and urgency who presents today for Cystoscopy.     The indications, risks and benefits of this procedure were discussed with the patient, consent was obtained prior to the procedure, and to the best of my judgement the patient seemed to understand and agree to the procedure.    PROCEDURE:  The patient  was brought into the procedure suite and informed consent was reviewed and confirmed. Vital signs were obtained prior to the procedure: /74   Pulse 61   Temp 35.9 °C (96.6 °F) .  The patient was escorted onto the stretcher, placed supine, prepped with betadine and draped in the usual standard surgical fashion.  Intraurethral 2% viscous lidocaine jelly was used for local analgesia.  A 16 Italian flexible cystourethroscope was inserted into the  urethra.       The penile urethra was normal.    Bladder: Upon entering the bladder the entire bladder was surveyed in a 360 degree fashion.  The left and right ureteral orifices were in normal orthotopic position effluxing clear yellow urine, bilaterally.   There was no evidence of any bladder lesions, foreign objects, stones or evidence of any mucosal changes. The cystoscope was then retroflexed.  The bladder neck was then further examined without any evidence of lesions.     The scope was then removed and in an antegrade fashion, the urethra and bladder were again resurveyed with no evidence of additional lesions.  The cystoscope was then fully removed.   The patient tolerated the procedure well.  Vitals were stable after the procedure.  The patient was able to void and was discharged home.  Verbal and written Post procedure instructions were reviewed with the patient.    IMPRESSION:  Small amount of right lateral lobe tissue regrowth by veru, overall channel is patent, normal bladder     PLAN:  Follow up in 6 months.     Assessment/Plan   Diagnoses and all orders for this visit:  BPH with obstruction/lower urinary tract symptoms  -     Cystourethroscopy  -     POCT UA Automated manually resulted    BPH with LUTS     Cystoscopy showed small amount of right lateral lobe tissue regrowth by veru, overall channel is patent, normal bladder.     We discussed removing residual tissue with GreenLight laser PVP with concurrent Botox. Patient states his symptoms are not bothersome enough at this time for intervention.     I discussed in detail the risks associated with the GreenLight laser PVP procedure, which include small risk of urinary incontinence, risk of erectile dysfunction, 60% risk of retrograde ejaculation, and additional risk of hematuria, UTI, and failure to improve urinary symptoms.     Risks of intravesical Botox injection were discussed with the patient in great detail. Adverse reactions reported have been  UTI, dysuria, hematuria, elevated PVR and urinary retention in up to 15% of patients. I explained that urinary retention is not permanent and usually resolves within 6 weeks.     Follow up in 6 months for PVR check. We will proceed with GreenLight laser PVP with concurrent Botox if symptoms worsen.     All questions were answered to the patient's satisfaction. Patient agrees with the plan and wishes to proceed. Follow-up will be scheduled appropriately.     I spent 30 minutes of dedicated E&M time, including preparation and review of records, notes, and data, time spent with patient/family, and documentation.     Scribed for Dr. Fuentes by Anais Banda. I , Dr Fuentes, have personally reviewed and agreed with the information entered by the Virtual Scribe.

## 2023-11-08 ENCOUNTER — APPOINTMENT (OUTPATIENT)
Dept: NEUROLOGY | Facility: HOSPITAL | Age: 73
End: 2023-11-08
Payer: MEDICARE

## 2023-12-13 ENCOUNTER — OFFICE VISIT (OUTPATIENT)
Dept: NEUROLOGY | Facility: HOSPITAL | Age: 73
End: 2023-12-13
Payer: MEDICARE

## 2023-12-13 VITALS
WEIGHT: 231 LBS | DIASTOLIC BLOOD PRESSURE: 73 MMHG | BODY MASS INDEX: 28.72 KG/M2 | SYSTOLIC BLOOD PRESSURE: 132 MMHG | HEART RATE: 69 BPM | HEIGHT: 75 IN | RESPIRATION RATE: 16 BRPM

## 2023-12-13 DIAGNOSIS — G50.0 TRIGEMINAL NEURALGIA: Primary | ICD-10-CM

## 2023-12-13 PROCEDURE — 1159F MED LIST DOCD IN RCRD: CPT | Performed by: PSYCHIATRY & NEUROLOGY

## 2023-12-13 PROCEDURE — 1125F AMNT PAIN NOTED PAIN PRSNT: CPT | Performed by: PSYCHIATRY & NEUROLOGY

## 2023-12-13 PROCEDURE — 1036F TOBACCO NON-USER: CPT | Performed by: PSYCHIATRY & NEUROLOGY

## 2023-12-13 PROCEDURE — 3078F DIAST BP <80 MM HG: CPT | Performed by: PSYCHIATRY & NEUROLOGY

## 2023-12-13 PROCEDURE — 99212 OFFICE O/P EST SF 10 MIN: CPT | Performed by: PSYCHIATRY & NEUROLOGY

## 2023-12-13 PROCEDURE — 1160F RVW MEDS BY RX/DR IN RCRD: CPT | Performed by: PSYCHIATRY & NEUROLOGY

## 2023-12-13 PROCEDURE — 99212 OFFICE O/P EST SF 10 MIN: CPT | Mod: GC | Performed by: PSYCHIATRY & NEUROLOGY

## 2023-12-13 PROCEDURE — 3044F HG A1C LEVEL LT 7.0%: CPT | Performed by: PSYCHIATRY & NEUROLOGY

## 2023-12-13 PROCEDURE — 3075F SYST BP GE 130 - 139MM HG: CPT | Performed by: PSYCHIATRY & NEUROLOGY

## 2023-12-13 ASSESSMENT — PATIENT HEALTH QUESTIONNAIRE - PHQ9
1. LITTLE INTEREST OR PLEASURE IN DOING THINGS: NOT AT ALL
SUM OF ALL RESPONSES TO PHQ9 QUESTIONS 1 AND 2: 0
2. FEELING DOWN, DEPRESSED OR HOPELESS: NOT AT ALL

## 2023-12-13 ASSESSMENT — PAIN SCALES - GENERAL: PAINLEVEL: 4

## 2023-12-13 NOTE — PROGRESS NOTES
Date of Service: 12/13/2023  Patient: Adebayo Puentes  MRN: [id number]  Referring Provider: No ref. provider found  Primary Care Physician: Mayda Carter MD     History:   History of presenting illness:  Adebayo Singh is a 73-year-old, right-handed man with right trigeminal neuralgia for which he follows with neuromuscular clinic.  He is well-controlled on carbamazepine 100 mg daily.  He was last seen in May this year, and presented today for follow-up.      Interval history:  Mr. Puentes has been stable since his last visit with us.  Her symptoms are very controlled on carbamazepine 100 mg daily.  He is compliant and tolerates the medication very well without side effects.  He still has infrequent subtle neck discomfort that he thinks could be due to muscle strain.  His inflammatory markers from last visit were unremarkable.     System Review:  All systems reviewed, negative except as stated above in HPI.     Prior history:  Past medical history, past surgical history, social history and family history remain unchanged since last visit.     Problem List Items Addressed This Visit    None      Medications:     Current Outpatient Medications:     amLODIPine (Norvasc) 10 mg tablet, Take 1 tablet (10 mg) by mouth once daily., Disp: 90 tablet, Rfl: 3    ascorbic acid, vitamin C, 1,000 mg ER tablet, Take by mouth., Disp: , Rfl:     aspirin 81 mg EC tablet, Take 1 tablet (81 mg) by mouth once daily., Disp: , Rfl:     atenolol (Tenormin) 25 mg tablet, Take 1 tablet (25 mg) by mouth once daily., Disp: 90 tablet, Rfl: 3    atorvastatin (Lipitor) 40 mg tablet, Take 1 tablet (40 mg) by mouth once daily., Disp: 90 tablet, Rfl: 3    calcium carbonate-vitamin D3 (Caltrate with Vitamin D3) 600 mg-20 mcg (800 unit) tablet, Take by mouth twice a day., Disp: , Rfl:     carBAMazepine (TEGretol) 100 mg chewable tablet, Chew early in the morning.., Disp: , Rfl:     cholecalciferol (Vitamin D-3) 50 MCG (2000 UT) tablet, Take 1 tablet (2,000  "Units) by mouth once daily., Disp: , Rfl:     escitalopram (Lexapro) 20 mg tablet, Take 1 tablet (20 mg) by mouth once daily., Disp: 90 tablet, Rfl: 3    esomeprazole (NexIUM) 20 mg DR capsule, Take 1 capsule (20 mg) by mouth once daily. Do not open capsule., Disp: , Rfl:     losartan-hydrochlorothiazide (Hyzaar) 100-25 mg tablet, Take 1 tablet by mouth once daily., Disp: 30 tablet, Rfl: 11    montelukast (Singulair) 10 mg tablet, Take 1 tablet (10 mg) by mouth once daily., Disp: 90 tablet, Rfl: 3    mv-min-folic acid-lutein (Centrum Silver) 400-250 mcg tablet,chewable, Chew 1 tablet once daily., Disp: , Rfl:     Myrbetriq 50 mg tablet extended release 24 hr 24 hr tablet, Take by mouth., Disp: , Rfl:     traZODone (Desyrel) 100 mg tablet, Take 1 tablet (100 mg) by mouth once daily at bedtime., Disp: 90 tablet, Rfl: 3    Anucort-HC 25 mg suppository, Insert 1 suppository (25 mg) into the rectum as needed at bedtime., Disp: , Rfl:     aspirin 325 mg EC tablet, Take by mouth 1 time., Disp: , Rfl:     atovaquone-proguaniL (Malarone) 250-100 mg tablet, Take 1 tablet daily starting 2 days prior to trip then daily until rx is gone (Patient not taking: Reported on 12/13/2023), Disp: 18 tablet, Rfl: 0    cholecalciferol (Vitamin D3) 5,000 Units tablet, Take 1 tablet (5,000 Units) by mouth 1 (one) time per week., Disp: , Rfl:     dutasteride (Avodart) 0.5 mg capsule, Take 1 capsule (0.5 mg) by mouth once daily., Disp: , Rfl:     losartan-hydrochlorothiazide (Hyzaar) 100-12.5 mg tablet, Take 1 tablet by mouth once daily., Disp: , Rfl:     Allergies:   No Known Allergies         Physical Exam:   /73   Pulse 69   Resp 16   Ht 1.905 m (6' 3\")   Wt 105 kg (231 lb)   BMI 28.87 kg/m²     Brief Neurological Exam:  MENTAL STATUS:  Awake, alert, cooperative and engaging. Oriented to time, place, person and condition. Expression, repetition, naming, comprehension intact. Follows commands.      CRANIAL NERVES:  Pupils are " equal and reactive to light.  EOM movement in all direction without diplopia or nystagmus.  Facial sensation and strength are intact and some drift bilaterally.  Shoulder shrugging is 5/5 bilaterally.  Tongue strength is 5/5 bilaterally.     MOTOR:   Strength:  Neck FLexion: 5/5  Neck Extension: 5/5      R             L   5             5             Shoulder abduction    5             5             Shoulder adduction               5             5             Elbow flexion           5             5             Elbow extension      5             5             Wrist flexion             5             5             Wrist extension        5             5             Finger flexion          5             5             Finger extension        5             5             Hip flexion                     5             5             Knee flexion            5             5             Knee extension        5             5             Ankle dorsiflexion    5             5             Ankle plantarflexion                            Results:   The following labs, imaging, and results were personally reviewed and demonstrated:    Labs:  Lab Results   Component Value Date/Time    HSCRP 4.4 (A) 05/11/2023 0919    SEDRATE 2 05/11/2023 0919       Provider Impression:   Impression:  Adebayo Puentes is a 73-year-old gentleman with a right trigeminal neuralgia who presents to the neuromuscular clinic today for follow-up.  Symptoms has been stable and well-controlled on Tegretol 100 mg daily.  Her exam today is unremarkable.  Plan to continue on the same dose of Tegretol, and we will see him for follow-up in 6 months.     Plan:  - Continue carbamazepine 100 mg daily  - RTC for follow-up in 6months.    Pricila Martell M.D.   Neuromuscular Fellow      No orders of the defined types were placed in this encounter.      Reviewed and approved by PRICILA MARTELL on 12/13/23 at 9:26 AM.    Attending Addendum:      -----------------------------------------------------------------------------------------------------------------------------  ATTENDING ATTESTATION    I saw patient with trainee and agree with the edits, history and exam that I helped formulate per above.    I personally spent 15 minutes on the day of the visit completing the review of the medical record and outside records, obtaining history and performing an appropriate physical exam, patient care, counseling and education, independently reviewing results, communicating with the patient, coordinating care and performing appropriate clinical documentation.    Lyssa Lo MD  Neuromuscular Neurology  [facility]  Office Phone Number: [phone]

## 2024-02-21 ENCOUNTER — APPOINTMENT (OUTPATIENT)
Dept: CARDIOLOGY | Facility: CLINIC | Age: 74
End: 2024-02-21
Payer: MEDICARE

## 2024-02-23 ENCOUNTER — APPOINTMENT (OUTPATIENT)
Dept: PRIMARY CARE | Facility: CLINIC | Age: 74
End: 2024-02-23
Payer: MEDICARE

## 2024-02-28 ENCOUNTER — OFFICE VISIT (OUTPATIENT)
Dept: CARDIOLOGY | Facility: CLINIC | Age: 74
End: 2024-02-28
Payer: MEDICARE

## 2024-02-28 VITALS
TEMPERATURE: 97.8 F | SYSTOLIC BLOOD PRESSURE: 121 MMHG | HEIGHT: 74 IN | DIASTOLIC BLOOD PRESSURE: 71 MMHG | BODY MASS INDEX: 29.65 KG/M2 | WEIGHT: 231 LBS | HEART RATE: 63 BPM

## 2024-02-28 DIAGNOSIS — I10 HYPERTENSION, UNSPECIFIED TYPE: ICD-10-CM

## 2024-02-28 DIAGNOSIS — E78.5 HYPERLIPIDEMIA, UNSPECIFIED HYPERLIPIDEMIA TYPE: ICD-10-CM

## 2024-02-28 DIAGNOSIS — I25.10 CORONARY ARTERY DISEASE INVOLVING NATIVE CORONARY ARTERY OF NATIVE HEART, UNSPECIFIED WHETHER ANGINA PRESENT: Primary | ICD-10-CM

## 2024-02-28 DIAGNOSIS — R06.09 DOE (DYSPNEA ON EXERTION): ICD-10-CM

## 2024-02-28 LAB
ATRIAL RATE: 59 BPM
P AXIS: 45 DEGREES
P OFFSET: 162 MS
P ONSET: 112 MS
PR INTERVAL: 200 MS
Q ONSET: 212 MS
QRS COUNT: 10 BEATS
QRS DURATION: 100 MS
QT INTERVAL: 426 MS
QTC CALCULATION(BAZETT): 421 MS
QTC FREDERICIA: 423 MS
R AXIS: -8 DEGREES
T AXIS: 27 DEGREES
T OFFSET: 425 MS
VENTRICULAR RATE: 59 BPM

## 2024-02-28 PROCEDURE — 99214 OFFICE O/P EST MOD 30 MIN: CPT | Performed by: NURSE PRACTITIONER

## 2024-02-28 PROCEDURE — 1125F AMNT PAIN NOTED PAIN PRSNT: CPT | Performed by: NURSE PRACTITIONER

## 2024-02-28 PROCEDURE — 3074F SYST BP LT 130 MM HG: CPT | Performed by: NURSE PRACTITIONER

## 2024-02-28 PROCEDURE — 1036F TOBACCO NON-USER: CPT | Performed by: NURSE PRACTITIONER

## 2024-02-28 PROCEDURE — 1159F MED LIST DOCD IN RCRD: CPT | Performed by: NURSE PRACTITIONER

## 2024-02-28 PROCEDURE — 3078F DIAST BP <80 MM HG: CPT | Performed by: NURSE PRACTITIONER

## 2024-02-28 PROCEDURE — 93005 ELECTROCARDIOGRAM TRACING: CPT | Performed by: NURSE PRACTITIONER

## 2024-02-28 PROCEDURE — 1160F RVW MEDS BY RX/DR IN RCRD: CPT | Performed by: NURSE PRACTITIONER

## 2024-02-28 NOTE — PROGRESS NOTES
"Chief Complaint:   CAD     History Of Present Illness:    Adebayo Puentes \"Ed\" is a 73 y.o. male here with for follow-up of coronary artery disease.  Adebayo \"Ed\" initially presented with acute MI and underwent successful thrombolytic therapy (2000).  The patient is tolerating guideline-directed medical therapy with antiplatelet and statin medication and is compliant.  The patient exercises regularly and follows a heart healthy diet.  The patient has been well since their last office appointment and is not having any anginal symptoms.     Still SOB, feels like it is getting worse. Weight relatively unchanged.     Sharp, shooting chest pain, lasts less than a minutes. Feels different than when had MI.     Echo (EF 55-60%. LA enlargement) - 2/16/2021     Allergies:  Patient has no known allergies.    Review of Systems  All pertinent systems have been reviewed and are negative except for what is stated in the history of present illness.    All other systems have been reviewed and are negative and noncontributory to this patient's current ailments.     Last Labs:  CBC -  Lab Results   Component Value Date    WBC 7.8 09/08/2023    HGB 14.2 09/08/2023    HCT 43.1 09/08/2023    MCV 95.1 09/08/2023     09/08/2023       CMP -  Lab Results   Component Value Date    CALCIUM 10.5 (H) 09/08/2023    PHOS 3.2 04/12/2021    PROT 7.6 09/08/2023    ALBUMIN 4.6 09/08/2023    AST 34 09/08/2023    ALT 34 09/08/2023    ALKPHOS 117 09/08/2023    BILITOT 0.6 09/08/2023       LIPID PANEL -   Lab Results   Component Value Date    CHOL 124 02/13/2023    TRIG 87 02/13/2023    HDL 41.6 02/13/2023    CHHDL 3.0 02/13/2023    LDLF 65 02/13/2023    VLDL 17 02/13/2023       RENAL FUNCTION PANEL -   Lab Results   Component Value Date    GLUCOSE 83 09/08/2023     09/08/2023    K 4.2 09/08/2023     09/08/2023    CO2 28 09/08/2023    ANIONGAP 11 09/08/2023    BUN 31 (H) 09/08/2023    CREATININE 1.7 (H) 09/08/2023    GFRMALE 58 (A) " 08/11/2023    CALCIUM 10.5 (H) 09/08/2023    PHOS 3.2 04/12/2021    ALBUMIN 4.6 09/08/2023        Lab Results   Component Value Date    BNP 83 02/15/2022    HGBA1C 6.0 (A) 08/11/2023     Objective   Vitals reviewed.   Constitutional:       Appearance: Healthy appearance. Not in distress.   Neck:      Vascular: No JVR. JVD normal.   Pulmonary:      Effort: Pulmonary effort is normal.      Breath sounds: Normal breath sounds. No wheezing. No rhonchi. No rales.   Chest:      Chest wall: Not tender to palpatation.   Cardiovascular:      PMI at left midclavicular line. Normal rate. Regular rhythm. Normal S1. Normal S2.       Murmurs: There is a grade 1/6 midsystolic murmur at the URSB.      No gallop.  No click. No rub.   Edema:     Peripheral edema absent.   Abdominal:      General: Bowel sounds are normal.      Palpations: Abdomen is soft.      Tenderness: There is no abdominal tenderness.   Musculoskeletal: Normal range of motion.         General: No tenderness. Skin:     General: Skin is warm and dry.   Neurological:      General: No focal deficit present.      Mental Status: Alert and oriented to person, place and time.       Assessment/Plan   Diagnoses and all orders for this visit:  Coronary artery disease involving native coronary artery of native heart, unspecified whether angina present  - EKG SB at 59bpm   - C/O increasing WALLIS. ? Angina. Checking nuclear stress test.  Hypertension, unspecified type  - well controlled  - last Cr elevated, has an apt with pcp tomorrow  Hyperlipidemia, unspecified hyperlipidemia type  - stable  - statin  Mild Aortic Stenosis  - checking echo   WALLIS  - echo and stress      Current Outpatient Medications:     amLODIPine (Norvasc) 10 mg tablet, Take 1 tablet (10 mg) by mouth once daily., Disp: 90 tablet, Rfl: 3    ascorbic acid, vitamin C, 1,000 mg ER tablet, Take by mouth., Disp: , Rfl:     aspirin 81 mg EC tablet, Take 1 tablet (81 mg) by mouth once daily., Disp: , Rfl:      atenolol (Tenormin) 25 mg tablet, Take 1 tablet (25 mg) by mouth once daily., Disp: 90 tablet, Rfl: 3    atorvastatin (Lipitor) 40 mg tablet, Take 1 tablet (40 mg) by mouth once daily., Disp: 90 tablet, Rfl: 3    calcium carbonate-vitamin D3 (Caltrate with Vitamin D3) 600 mg-20 mcg (800 unit) tablet, Take by mouth twice a day., Disp: , Rfl:     carBAMazepine (TEGretol) 100 mg chewable tablet, Chew early in the morning.., Disp: , Rfl:     cholecalciferol (Vitamin D3) 5,000 Units tablet, Take 1 tablet (5,000 Units) by mouth 1 (one) time per week., Disp: , Rfl:     dutasteride (Avodart) 0.5 mg capsule, Take 1 capsule (0.5 mg) by mouth once daily., Disp: , Rfl:     escitalopram (Lexapro) 20 mg tablet, Take 1 tablet (20 mg) by mouth once daily., Disp: 90 tablet, Rfl: 3    esomeprazole (NexIUM) 20 mg DR capsule, Take 1 capsule (20 mg) by mouth once daily. Do not open capsule., Disp: , Rfl:     losartan-hydrochlorothiazide (Hyzaar) 100-25 mg tablet, Take 1 tablet by mouth once daily., Disp: 30 tablet, Rfl: 11    montelukast (Singulair) 10 mg tablet, Take 1 tablet (10 mg) by mouth once daily., Disp: 90 tablet, Rfl: 3    mv-min-folic acid-lutein (Centrum Silver) 400-250 mcg tablet,chewable, Chew 1 tablet once daily., Disp: , Rfl:     Myrbetriq 50 mg tablet extended release 24 hr 24 hr tablet, Take by mouth., Disp: , Rfl:     traZODone (Desyrel) 100 mg tablet, Take 1 tablet (100 mg) by mouth once daily at bedtime., Disp: 90 tablet, Rfl: 3    Exclusive of any other services or procedures performed, Toshia CHANDLER, spent 30 minutes in duration for this visit today.  This time consisted of chart review, obtaining history, and/or performing the exam as documented above, as well as, documenting the clinical information for the encounter in the electronic record, discussing treatment options, plans, and/or goals with patient, family, and/or caregiver, refilling medications, updating the electronic record, ordering  medicines, lab work, imaging, referrals, and/or procedures as documented above and communicating with other [facility] professionals. I have discussed the results of laboratory, radiology, and cardiology studies with the patient and their family/caregiver.

## 2024-02-29 ENCOUNTER — OFFICE VISIT (OUTPATIENT)
Dept: PRIMARY CARE | Facility: CLINIC | Age: 74
End: 2024-02-29
Payer: MEDICARE

## 2024-02-29 VITALS
SYSTOLIC BLOOD PRESSURE: 116 MMHG | TEMPERATURE: 98 F | DIASTOLIC BLOOD PRESSURE: 70 MMHG | BODY MASS INDEX: 29.89 KG/M2 | WEIGHT: 232.8 LBS

## 2024-02-29 DIAGNOSIS — G50.0 RIGHT TRIGEMINAL NEURALGIA: ICD-10-CM

## 2024-02-29 DIAGNOSIS — T78.40XS ALLERGY, SEQUELA: ICD-10-CM

## 2024-02-29 DIAGNOSIS — E10.42: ICD-10-CM

## 2024-02-29 DIAGNOSIS — E78.5 HYPERLIPIDEMIA, UNSPECIFIED HYPERLIPIDEMIA TYPE: ICD-10-CM

## 2024-02-29 DIAGNOSIS — C61 PROSTATE CANCER (MULTI): ICD-10-CM

## 2024-02-29 DIAGNOSIS — I10 HYPERTENSION, UNSPECIFIED TYPE: ICD-10-CM

## 2024-02-29 DIAGNOSIS — N18.31 STAGE 3A CHRONIC KIDNEY DISEASE (MULTI): Primary | ICD-10-CM

## 2024-02-29 DIAGNOSIS — F41.9 ANXIETY: ICD-10-CM

## 2024-02-29 DIAGNOSIS — F33.42 RECURRENT MAJOR DEPRESSIVE DISORDER, IN FULL REMISSION (CMS-HCC): ICD-10-CM

## 2024-02-29 DIAGNOSIS — R73.01 IMPAIRED FASTING BLOOD SUGAR: ICD-10-CM

## 2024-02-29 PROCEDURE — 1160F RVW MEDS BY RX/DR IN RCRD: CPT | Performed by: INTERNAL MEDICINE

## 2024-02-29 PROCEDURE — 99214 OFFICE O/P EST MOD 30 MIN: CPT | Performed by: INTERNAL MEDICINE

## 2024-02-29 PROCEDURE — 1125F AMNT PAIN NOTED PAIN PRSNT: CPT | Performed by: INTERNAL MEDICINE

## 2024-02-29 PROCEDURE — 3074F SYST BP LT 130 MM HG: CPT | Performed by: INTERNAL MEDICINE

## 2024-02-29 PROCEDURE — 1036F TOBACCO NON-USER: CPT | Performed by: INTERNAL MEDICINE

## 2024-02-29 PROCEDURE — 1159F MED LIST DOCD IN RCRD: CPT | Performed by: INTERNAL MEDICINE

## 2024-02-29 PROCEDURE — 3078F DIAST BP <80 MM HG: CPT | Performed by: INTERNAL MEDICINE

## 2024-02-29 PROCEDURE — 1124F ACP DISCUSS-NO DSCNMKR DOCD: CPT | Performed by: INTERNAL MEDICINE

## 2024-02-29 NOTE — PROGRESS NOTES
Subjective   Reason for Visit: Adebayo Puentes is an 73 y.o. male here for f/u      Past Medical, Surgical, and Family History reviewed and updated in chart.    Reviewed all medications by prescribing practitioner or clinical pharmacist (such as prescriptions, OTCs, herbal therapies and supplements) and documented in the medical record.    Med Refill  Overall well   #1 ventral hernia-status post repair , no pain  #2 CAD- no CP  #3 HTN- no HA, CP, dizziness  #4 renal stones/BPH/cysts- no issues  #5 lipids-good. Recheck   #6 CKD stage 3 - f/u nephrology. Reviewed no NSAIDs.   #7 hearing loss- stable. f/u ENT  #8 RC- s/p surgery. doing well. f/u ortho.   #9 hip fracture- resolved.   #10 globus sensation- s/p ENT. f/u ENT  #11 fatigue- better  #12 depression- good. con't Rx.   #13 fall- balance much better, reviewed  #14 neck mass- resolved. f/u ENT PRN.   #15 insomnia/mood- good, con't rx.   #16 BPH- Follow-up urology   #17 TN- improved. s/p gamma-knife. con't lyrica, f/u neuro.  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 diet/exercise reviewed. retest ha1c.    Patient Care Team:  Mayda Carter MD as PCP - General  Mayda Carter MD as PCP - [facility]  Mayda Carter MD as Primary Care Provider     Review of Systems   All other systems reviewed and are negative.      Objective   Vitals:  /70 (BP Location: Left arm, Patient Position: Sitting, BP Cuff Size: Large adult)   Temp 36.7 °C (98 °F)   Wt 106 kg (232 lb 12.8 oz)   BMI 29.89 kg/m²       Physical Exam  Constitutional:       Appearance: Normal appearance.   Cardiovascular:      Rate and Rhythm: Normal rate and regular rhythm.      Pulses: Normal pulses.      Heart sounds: No murmur heard.     No gallop.   Pulmonary:      Effort: Pulmonary effort is normal. No respiratory distress.      Breath sounds: Normal breath sounds. No wheezing, rhonchi or rales.   Neurological:      Mental Status: He is alert.   Psychiatric:         Mood and Affect: Mood  normal.         Behavior: Behavior normal.         Thought Content: Thought content normal.         Judgment: Judgment normal.     Lab Results   Component Value Date    WBC 7.8 09/08/2023    HGB 14.2 09/08/2023    HCT 43.1 09/08/2023     09/08/2023    CHOL 124 02/13/2023    TRIG 87 02/13/2023    HDL 41.6 02/13/2023    ALT 34 09/08/2023    AST 34 09/08/2023     09/08/2023    K 4.2 09/08/2023     09/08/2023    CREATININE 1.7 (H) 09/08/2023    BUN 31 (H) 09/08/2023    CO2 28 09/08/2023    TSH 3.32 08/18/2022    PSA 3.82 07/21/2023    INR 1.0 09/08/2023    HGBA1C 6.0 (A) 08/11/2023       Assessment/Plan   Problem List Items Addressed This Visit       Hyperlipidemia    Relevant Orders    Alanine Aminotransferase    Lipid Panel    Hypertension    Relevant Medications    amLODIPine (Norvasc) 10 mg tablet    atenolol (Tenormin) 25 mg tablet    Impaired fasting blood sugar    Relevant Orders    CBC    Hemoglobin A1C    Right trigeminal neuralgia    Relevant Medications    carBAMazepine (TEGretol) 100 mg chewable tablet     Other Visit Diagnoses       Stage 3a chronic kidney disease (CMS/HCC)    -  Primary    Relevant Orders    Basic Metabolic Panel    Anxiety        Relevant Medications    escitalopram (Lexapro) 20 mg tablet    traZODone (Desyrel) 100 mg tablet    Allergy, sequela        Relevant Medications    montelukast (Singulair) 10 mg tablet          #1 ventral hernia-status post repair doing well. f/u surgery PRN  #2 CAD- class 1 dz. neg stress last. episode of non-acrdiac CP, neg eval 2/22. Clinically stable. f//u w/ Dr Howell- qyear.  #3 HTN- good. con't rx. f/u nephrology.  #4 renal stones/BPH/cysts-stable. f/u , appt pending  #5 lipids-good. Recheck    #6 CKD stage 3 - f/u nephrology. Reviewed no NSAIDs.   #7 hearing loss- stable. f/u ENT  #8 RC- s/p surgery. doing well. f/u ortho.   #9 hip fracture- resolved.   #10 globus sensation- s/p ENT. f/u ENT  #11 fatigue- better  #12 depression- good.  con't Rx.   #13 fall- balance much better, reviewed  #14 neck mass- resolved. f/u ENT PRN.   #15 insomnia/mood- good, con't rx.   #16 BPH- Follow-up urology   #17 TN- improved. s/p gamma-knife. con't lyrica, f/u neuro.  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 diet/exercise reviewed. retest ha1c.  #19 rectal bleeding- resolved.  Suspect hemorrhoids.  Check cbc, c/s GI  #20 prostate CA- f/u    Full code  last colo 9/21--> is due for colonoscopy 2026

## 2024-03-01 RX ORDER — AMLODIPINE BESYLATE 10 MG/1
10 TABLET ORAL DAILY
Qty: 90 TABLET | Refills: 3 | Status: SHIPPED | OUTPATIENT
Start: 2024-03-01 | End: 2025-03-01

## 2024-03-01 RX ORDER — ATENOLOL 25 MG/1
25 TABLET ORAL DAILY
Qty: 90 TABLET | Refills: 3 | Status: SHIPPED | OUTPATIENT
Start: 2024-03-01 | End: 2025-03-01

## 2024-03-01 RX ORDER — TRAZODONE HYDROCHLORIDE 100 MG/1
100 TABLET ORAL NIGHTLY
Qty: 90 TABLET | Refills: 3 | Status: SHIPPED | OUTPATIENT
Start: 2024-03-01 | End: 2025-03-01

## 2024-03-01 RX ORDER — ESCITALOPRAM OXALATE 20 MG/1
20 TABLET ORAL DAILY
Qty: 90 TABLET | Refills: 3 | Status: SHIPPED | OUTPATIENT
Start: 2024-03-01 | End: 2025-03-01

## 2024-03-01 RX ORDER — CARBAMAZEPINE 100 MG/1
100 TABLET, CHEWABLE ORAL DAILY
Qty: 90 TABLET | Refills: 3 | Status: SHIPPED | OUTPATIENT
Start: 2024-03-01 | End: 2025-03-01

## 2024-03-01 RX ORDER — MONTELUKAST SODIUM 10 MG/1
10 TABLET ORAL DAILY
Qty: 90 TABLET | Refills: 3 | Status: SHIPPED | OUTPATIENT
Start: 2024-03-01 | End: 2025-03-01

## 2024-03-04 ENCOUNTER — TELEPHONE (OUTPATIENT)
Dept: CARDIOLOGY | Facility: CLINIC | Age: 74
End: 2024-03-04
Payer: MEDICARE

## 2024-03-04 NOTE — TELEPHONE ENCOUNTER
Spoke with pt and went over stress prep. NPO 4 hrs prior, Hold atenolol 24 hrs prior, No caffeine 24 hrs prior. Pt verbalized understanding

## 2024-03-06 ENCOUNTER — HOSPITAL ENCOUNTER (OUTPATIENT)
Dept: CARDIOLOGY | Facility: CLINIC | Age: 74
Discharge: HOME | End: 2024-03-06
Payer: MEDICARE

## 2024-03-06 ENCOUNTER — APPOINTMENT (OUTPATIENT)
Dept: LAB | Facility: LAB | Age: 74
End: 2024-03-06
Payer: MEDICARE

## 2024-03-06 ENCOUNTER — LAB (OUTPATIENT)
Dept: LAB | Facility: LAB | Age: 74
End: 2024-03-06
Payer: MEDICARE

## 2024-03-06 ENCOUNTER — OFFICE VISIT (OUTPATIENT)
Dept: CARDIOLOGY | Facility: CLINIC | Age: 74
End: 2024-03-06
Payer: MEDICARE

## 2024-03-06 VITALS
HEIGHT: 75 IN | BODY MASS INDEX: 28.35 KG/M2 | WEIGHT: 228 LBS | SYSTOLIC BLOOD PRESSURE: 134 MMHG | DIASTOLIC BLOOD PRESSURE: 82 MMHG | HEART RATE: 59 BPM

## 2024-03-06 DIAGNOSIS — E78.2 MIXED HYPERLIPIDEMIA: ICD-10-CM

## 2024-03-06 DIAGNOSIS — I25.10 CORONARY ARTERY DISEASE INVOLVING NATIVE CORONARY ARTERY OF NATIVE HEART WITHOUT ANGINA PECTORIS: Primary | ICD-10-CM

## 2024-03-06 DIAGNOSIS — I25.10 CORONARY ARTERY DISEASE INVOLVING NATIVE CORONARY ARTERY OF NATIVE HEART, UNSPECIFIED WHETHER ANGINA PRESENT: ICD-10-CM

## 2024-03-06 DIAGNOSIS — R06.09 DOE (DYSPNEA ON EXERTION): ICD-10-CM

## 2024-03-06 DIAGNOSIS — N18.31 STAGE 3A CHRONIC KIDNEY DISEASE (MULTI): ICD-10-CM

## 2024-03-06 DIAGNOSIS — R73.01 IMPAIRED FASTING BLOOD SUGAR: ICD-10-CM

## 2024-03-06 DIAGNOSIS — C61 MALIGNANT NEOPLASM OF PROSTATE (MULTI): Primary | ICD-10-CM

## 2024-03-06 DIAGNOSIS — R06.02 SHORTNESS OF BREATH: ICD-10-CM

## 2024-03-06 DIAGNOSIS — E78.5 HYPERLIPIDEMIA, UNSPECIFIED HYPERLIPIDEMIA TYPE: ICD-10-CM

## 2024-03-06 DIAGNOSIS — I10 HYPERTENSION, UNSPECIFIED TYPE: ICD-10-CM

## 2024-03-06 LAB
ALT SERPL W P-5'-P-CCNC: 39 U/L (ref 10–52)
ANION GAP SERPL CALC-SCNC: 10 MMOL/L (ref 10–20)
AORTIC VALVE MEAN GRADIENT: 7.2 MMHG
AORTIC VALVE PEAK VELOCITY: 1.92 M/S
AV PEAK GRADIENT: 14.7 MMHG
AVA (PEAK VEL): 2.16 CM2
AVA (VTI): 2.15 CM2
BUN SERPL-MCNC: 37 MG/DL (ref 6–23)
CALCIUM SERPL-MCNC: 9.7 MG/DL (ref 8.6–10.3)
CHLORIDE SERPL-SCNC: 104 MMOL/L (ref 98–107)
CHOLEST SERPL-MCNC: 136 MG/DL (ref 0–199)
CHOLESTEROL/HDL RATIO: 3
CO2 SERPL-SCNC: 33 MMOL/L (ref 21–32)
CREAT SERPL-MCNC: 1.34 MG/DL (ref 0.5–1.3)
EGFRCR SERPLBLD CKD-EPI 2021: 56 ML/MIN/1.73M*2
EJECTION FRACTION APICAL 4 CHAMBER: 52.9
EJECTION FRACTION: 59 %
ERYTHROCYTE [DISTWIDTH] IN BLOOD BY AUTOMATED COUNT: 14.1 % (ref 11.5–14.5)
EST. AVERAGE GLUCOSE BLD GHB EST-MCNC: 117 MG/DL
GLUCOSE SERPL-MCNC: 81 MG/DL (ref 74–99)
HBA1C MFR BLD: 5.7 %
HCT VFR BLD AUTO: 44.3 % (ref 41–52)
HDLC SERPL-MCNC: 46.1 MG/DL
HGB BLD-MCNC: 14.2 G/DL (ref 13.5–17.5)
LDLC SERPL CALC-MCNC: 73 MG/DL
LEFT ATRIUM VOLUME AREA LENGTH INDEX BSA: 37.1 ML/M2
LEFT VENTRICLE INTERNAL DIMENSION DIASTOLE: 4.11 CM (ref 3.5–6)
LEFT VENTRICULAR OUTFLOW TRACT DIAMETER: 2.04 CM
MCH RBC QN AUTO: 30.8 PG (ref 26–34)
MCHC RBC AUTO-ENTMCNC: 32.1 G/DL (ref 32–36)
MCV RBC AUTO: 96 FL (ref 80–100)
MITRAL VALVE E/A RATIO: 0.57
MITRAL VALVE E/E' RATIO: 9.31
NON HDL CHOLESTEROL: 90 MG/DL (ref 0–149)
NRBC BLD-RTO: 0 /100 WBCS (ref 0–0)
PLATELET # BLD AUTO: 180 X10*3/UL (ref 150–450)
POTASSIUM SERPL-SCNC: 4.1 MMOL/L (ref 3.5–5.3)
PSA SERPL-MCNC: 4.82 NG/ML
RBC # BLD AUTO: 4.61 X10*6/UL (ref 4.5–5.9)
RIGHT VENTRICLE FREE WALL PEAK S': 11 CM/S
RIGHT VENTRICLE PEAK SYSTOLIC PRESSURE: 25.8 MMHG
SODIUM SERPL-SCNC: 143 MMOL/L (ref 136–145)
TRICUSPID ANNULAR PLANE SYSTOLIC EXCURSION: 1.8 CM
TRIGL SERPL-MCNC: 83 MG/DL (ref 0–149)
VLDL: 17 MG/DL (ref 0–40)
WBC # BLD AUTO: 6.3 X10*3/UL (ref 4.4–11.3)

## 2024-03-06 PROCEDURE — A9502 TC99M TETROFOSMIN: HCPCS | Performed by: INTERNAL MEDICINE

## 2024-03-06 PROCEDURE — 1125F AMNT PAIN NOTED PAIN PRSNT: CPT | Performed by: NURSE PRACTITIONER

## 2024-03-06 PROCEDURE — 99215 OFFICE O/P EST HI 40 MIN: CPT | Performed by: NURSE PRACTITIONER

## 2024-03-06 PROCEDURE — 84153 ASSAY OF PSA TOTAL: CPT

## 2024-03-06 PROCEDURE — 80048 BASIC METABOLIC PNL TOTAL CA: CPT

## 2024-03-06 PROCEDURE — 84460 ALANINE AMINO (ALT) (SGPT): CPT

## 2024-03-06 PROCEDURE — 85027 COMPLETE CBC AUTOMATED: CPT

## 2024-03-06 PROCEDURE — 3075F SYST BP GE 130 - 139MM HG: CPT | Performed by: NURSE PRACTITIONER

## 2024-03-06 PROCEDURE — 83036 HEMOGLOBIN GLYCOSYLATED A1C: CPT

## 2024-03-06 PROCEDURE — 3079F DIAST BP 80-89 MM HG: CPT | Performed by: NURSE PRACTITIONER

## 2024-03-06 PROCEDURE — 1160F RVW MEDS BY RX/DR IN RCRD: CPT | Performed by: NURSE PRACTITIONER

## 2024-03-06 PROCEDURE — 3430000001 HC RX 343 DIAGNOSTIC RADIOPHARMACEUTICALS: Performed by: INTERNAL MEDICINE

## 2024-03-06 PROCEDURE — 1159F MED LIST DOCD IN RCRD: CPT | Performed by: NURSE PRACTITIONER

## 2024-03-06 PROCEDURE — 80061 LIPID PANEL: CPT

## 2024-03-06 PROCEDURE — 93306 TTE W/DOPPLER COMPLETE: CPT | Performed by: INTERNAL MEDICINE

## 2024-03-06 PROCEDURE — 78452 HT MUSCLE IMAGE SPECT MULT: CPT

## 2024-03-06 PROCEDURE — 36415 COLL VENOUS BLD VENIPUNCTURE: CPT

## 2024-03-06 PROCEDURE — 1036F TOBACCO NON-USER: CPT | Performed by: NURSE PRACTITIONER

## 2024-03-06 PROCEDURE — 93306 TTE W/DOPPLER COMPLETE: CPT

## 2024-03-06 RX ADMIN — TETROFOSMIN 30 MILLICURIE: 0.23 INJECTION, POWDER, LYOPHILIZED, FOR SOLUTION INTRAVENOUS at 08:32

## 2024-03-06 RX ADMIN — TETROFOSMIN 10 MILLICURIE: 0.23 INJECTION, POWDER, LYOPHILIZED, FOR SOLUTION INTRAVENOUS at 07:15

## 2024-03-06 NOTE — PROGRESS NOTES
"Chief Complaint:   CAD     History Of Present Illness:    Adebayo Puentes \"Ed\" is a 73 y.o. male here with for follow-up of coronary artery disease.  Adebayo \"Ed\" initially presented with acute MI and underwent successful thrombolytic therapy (2000).  The patient is tolerating guideline-directed medical therapy with antiplatelet and statin medication and is compliant.  The patient exercises regularly and follows a heart healthy diet.  The patient has been well since their last office appointment and is not having any anginal symptoms.     Still SOB, feels like it is getting worse. Weight relatively unchanged.     Sharp, shooting chest pain, lasts less than a minutes. Feels different than when had MI.     Here for stress test and echo.    Echo (EF 55-60%. LA enlargement) - 2/16/2021     Allergies:  Patient has no known allergies.    Review of Systems  All pertinent systems have been reviewed and are negative except for what is stated in the history of present illness.    All other systems have been reviewed and are negative and noncontributory to this patient's current ailments.     Visit Vitals  /82 (BP Location: Right arm, Patient Position: Sitting, BP Cuff Size: Adult)   Pulse 59   Ht 1.905 m (6' 3\")   Wt 103 kg (228 lb)   BMI 28.50 kg/m²   Smoking Status Never   BSA 2.33 m²         Last Labs:  CBC -  Lab Results   Component Value Date    WBC 7.8 09/08/2023    HGB 14.2 09/08/2023    HCT 43.1 09/08/2023    MCV 95.1 09/08/2023     09/08/2023       CMP -  Lab Results   Component Value Date    CALCIUM 10.5 (H) 09/08/2023    PHOS 3.2 04/12/2021    PROT 7.6 09/08/2023    ALBUMIN 4.6 09/08/2023    AST 34 09/08/2023    ALT 34 09/08/2023    ALKPHOS 117 09/08/2023    BILITOT 0.6 09/08/2023       LIPID PANEL -   Lab Results   Component Value Date    CHOL 124 02/13/2023    TRIG 87 02/13/2023    HDL 41.6 02/13/2023    CHHDL 3.0 02/13/2023    LDLF 65 02/13/2023    VLDL 17 02/13/2023       RENAL FUNCTION PANEL -   Lab " Results   Component Value Date    GLUCOSE 83 09/08/2023     09/08/2023    K 4.2 09/08/2023     09/08/2023    CO2 28 09/08/2023    ANIONGAP 11 09/08/2023    BUN 31 (H) 09/08/2023    CREATININE 1.7 (H) 09/08/2023    GFRMALE 58 (A) 08/11/2023    CALCIUM 10.5 (H) 09/08/2023    PHOS 3.2 04/12/2021    ALBUMIN 4.6 09/08/2023        Lab Results   Component Value Date    BNP 83 02/15/2022    HGBA1C 6.0 (A) 08/11/2023     Objective   Vitals reviewed.   Constitutional:       Appearance: Healthy appearance. Not in distress.   Neck:      Vascular: No JVR. JVD normal.   Pulmonary:      Effort: Pulmonary effort is normal.      Breath sounds: Normal breath sounds. No wheezing. No rhonchi. No rales.   Chest:      Chest wall: Not tender to palpatation.   Cardiovascular:      PMI at left midclavicular line. Normal rate. Regular rhythm. Normal S1. Normal S2.       Murmurs: There is a grade 1/6 midsystolic murmur at the URSB.      No gallop.  No click. No rub.   Edema:     Peripheral edema absent.   Abdominal:      General: Bowel sounds are normal.      Palpations: Abdomen is soft.      Tenderness: There is no abdominal tenderness.   Musculoskeletal: Normal range of motion.         General: No tenderness. Skin:     General: Skin is warm and dry.   Neurological:      General: No focal deficit present.      Mental Status: Alert and oriented to person, place and time.       Assessment/Plan   Diagnoses and all orders for this visit:  Coronary artery disease involving native coronary artery of native heart, unspecified whether angina present  - EKG SB at 59bpm   - stress test negative for ischemia  - small area of scar consistent with previous MI  - CP non cardiac  Hypertension, unspecified type  - well controlled  - last Cr elevated, defer pcp  Hyperlipidemia, unspecified hyperlipidemia type  - stable  - statin  WALLIS  - stress test negative   - nothing untoward on echo  - non cardiac    Follow up in 1 year      Current Outpatient  Medications:     amLODIPine (Norvasc) 10 mg tablet, Take 1 tablet (10 mg) by mouth once daily., Disp: 90 tablet, Rfl: 3    ascorbic acid, vitamin C, 1,000 mg ER tablet, Take by mouth., Disp: , Rfl:     aspirin 81 mg EC tablet, Take 1 tablet (81 mg) by mouth once daily., Disp: , Rfl:     atenolol (Tenormin) 25 mg tablet, Take 1 tablet (25 mg) by mouth once daily., Disp: 90 tablet, Rfl: 3    atorvastatin (Lipitor) 40 mg tablet, Take 1 tablet (40 mg) by mouth once daily., Disp: 90 tablet, Rfl: 3    calcium carbonate-vitamin D3 (Caltrate with Vitamin D3) 600 mg-20 mcg (800 unit) tablet, Take by mouth twice a day., Disp: , Rfl:     carBAMazepine (TEGretol) 100 mg chewable tablet, Chew 1 tablet (100 mg) early in the morning.., Disp: 90 tablet, Rfl: 3    cholecalciferol (Vitamin D3) 5,000 Units tablet, Take 1 tablet (5,000 Units) by mouth 1 (one) time per week., Disp: , Rfl:     escitalopram (Lexapro) 20 mg tablet, Take 1 tablet (20 mg) by mouth once daily., Disp: 90 tablet, Rfl: 3    esomeprazole (NexIUM) 20 mg DR capsule, Take 1 capsule (20 mg) by mouth once daily. Do not open capsule., Disp: , Rfl:     losartan-hydrochlorothiazide (Hyzaar) 100-25 mg tablet, Take 1 tablet by mouth once daily., Disp: 30 tablet, Rfl: 11    montelukast (Singulair) 10 mg tablet, Take 1 tablet (10 mg) by mouth once daily., Disp: 90 tablet, Rfl: 3    mv-min-folic acid-lutein (Centrum Silver) 400-250 mcg tablet,chewable, Chew 1 tablet once daily., Disp: , Rfl:     Myrbetriq 50 mg tablet extended release 24 hr 24 hr tablet, Take by mouth., Disp: , Rfl:     traZODone (Desyrel) 100 mg tablet, Take 1 tablet (100 mg) by mouth once daily at bedtime., Disp: 90 tablet, Rfl: 3  No current facility-administered medications for this visit.    Exclusive of any other services or procedures performed, I, Toshia TREVINO, spent 40 minutes in duration for this visit today.  This time consisted of chart review, obtaining history, and/or performing the  exam as documented above, as well as, documenting the clinical information for the encounter in the electronic record, discussing treatment options, plans, and/or goals with patient, family, and/or caregiver, refilling medications, updating the electronic record, ordering medicines, lab work, imaging, referrals, and/or procedures as documented above and communicating with other [facility] professionals. I have discussed the results of laboratory, radiology, and cardiology studies with the patient and their family/caregiver.

## 2024-03-21 ENCOUNTER — OFFICE VISIT (OUTPATIENT)
Dept: UROLOGY | Facility: CLINIC | Age: 74
End: 2024-03-21
Payer: MEDICARE

## 2024-03-21 VITALS — HEIGHT: 74 IN | TEMPERATURE: 95.3 F | BODY MASS INDEX: 29.26 KG/M2 | WEIGHT: 228 LBS

## 2024-03-21 DIAGNOSIS — R97.20 ELEVATED PSA: Primary | ICD-10-CM

## 2024-03-21 PROCEDURE — 1160F RVW MEDS BY RX/DR IN RCRD: CPT | Performed by: STUDENT IN AN ORGANIZED HEALTH CARE EDUCATION/TRAINING PROGRAM

## 2024-03-21 PROCEDURE — 1126F AMNT PAIN NOTED NONE PRSNT: CPT | Performed by: STUDENT IN AN ORGANIZED HEALTH CARE EDUCATION/TRAINING PROGRAM

## 2024-03-21 PROCEDURE — G2211 COMPLEX E/M VISIT ADD ON: HCPCS | Performed by: STUDENT IN AN ORGANIZED HEALTH CARE EDUCATION/TRAINING PROGRAM

## 2024-03-21 PROCEDURE — 3048F LDL-C <100 MG/DL: CPT | Performed by: STUDENT IN AN ORGANIZED HEALTH CARE EDUCATION/TRAINING PROGRAM

## 2024-03-21 PROCEDURE — 99213 OFFICE O/P EST LOW 20 MIN: CPT | Performed by: STUDENT IN AN ORGANIZED HEALTH CARE EDUCATION/TRAINING PROGRAM

## 2024-03-21 PROCEDURE — 3044F HG A1C LEVEL LT 7.0%: CPT | Performed by: STUDENT IN AN ORGANIZED HEALTH CARE EDUCATION/TRAINING PROGRAM

## 2024-03-21 PROCEDURE — 1036F TOBACCO NON-USER: CPT | Performed by: STUDENT IN AN ORGANIZED HEALTH CARE EDUCATION/TRAINING PROGRAM

## 2024-03-21 PROCEDURE — 1159F MED LIST DOCD IN RCRD: CPT | Performed by: STUDENT IN AN ORGANIZED HEALTH CARE EDUCATION/TRAINING PROGRAM

## 2024-03-21 RX ORDER — TAMSULOSIN HYDROCHLORIDE 0.4 MG/1
0.4 CAPSULE ORAL DAILY
Qty: 90 EACH | Refills: 0 | Status: SHIPPED | OUTPATIENT
Start: 2024-03-21 | End: 2024-06-19

## 2024-03-21 ASSESSMENT — PAIN SCALES - GENERAL: PAINLEVEL: 0-NO PAIN

## 2024-03-21 NOTE — PROGRESS NOTES
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 Garcia for elevated PSA. Hx of BPH w/ LUTS, CAD, CKD stage II, ED, HLD, HTN. PSA 3.82 (7/21/23). MRI Prostate (8/8/23) showed a 0.7 cm PI-RADS 4 lesion within the anterior midline TZ towards the apex. S/p TP prostate biopsy (9/12/23) with pathology showing prostatic adenocarcinoma, GG1 (Kd 3+3=6), multi cores, high-grade PIN. PSA 4.82 (3/6/24). Reports nocturia 2-6x. Doing well.      PSA: 4.82 (3/6/24), 3.82 (7/21/23)     MRI Prostate (8/8/23): 35.2g  a 0.7 cm PI-RADS 4 lesion within the anterior midline TZ towards the apex.     Review of Systems:  All systems reviewed. Anything negative noted in the HPI.    Physical Exam:  Vitals signs reviewed.  Constitutional:      Appearance: Well-developed.  HENT:     Head: Normocephalic and atraumatic.  Neck:     Musculoskeletal: Normal range of motion.  Pulmonary:     Effort: Pulmonary effort is normal.  Musculoskeletal: Normal range of motion.  Skin:     General: Skin is warm and dry.  Neurological:     Mental Status: Alert and oriented to person, place, and time.  Psychiatric:        Behavior: Behavior normal.        Thought Content: Thought content normal.        Judgment: Judgment normal.    Procedures:    Assessment/Plan   73 year old male referred by Dr. Garcia for elevated PSA. Hx of BPH w/ LUTS, CAD, CKD stage II, ED, HLD, HTN. PSA 3.82 (7/21/23). MRI Prostate (8/8/23) showed a 0.7 cm PI-RADS 4 lesion within the anterior midline TZ towards the apex. S/p TP prostate biopsy (9/12/23) with pathology showing prostatic adenocarcinoma, GG1 ([place] 3+3=6), multi cores, high-grade PIN. PSA 4.82 (3/6/24). Reports nocturia 2-6x. Doing well. Management options including risks, benefits and alternatives discussed at length and all questions answered. Patient prefers to proceed with follow-up in 6mos  with MRI Prostate, PSA, and TP prostate biopsy at [facility] [place].     Gave samples of Myrbetriq             By signing my name below, I, Arline Arnold, attest that this documentation has been prepared under the direction and in the presence of Dr. Skyler Schmitt.  All medical record entries made by the Scribe were at my direction and personally dictated by me. I have reviewed the chart and agree that the record accurately reflects my personal performance of the history, physical exam, discussion and plan.

## 2024-05-04 NOTE — PROGRESS NOTES
Subjective   Adebayo Puentes is a 73 y.o. male  with a history of TURP 07/2020 and PVP 05/05/2022. S/p TP prostate biopsy (9/12/23) with Dr. 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 History of elevated PSA. He presents today for a follow up visit. Cystoscopy from 11/6/2023 showed small amount of right lateral lobe tissue regrowth by veru, overall channel is patent, normal bladder.     Today, patient reports intermittent urinary stream, occasional post void dribbling and nocturia x2, these are not bothersome. He is doing well overall.         Past Medical History:   Diagnosis Date    Abnormal findings on diagnostic imaging of other parts of musculoskeletal system 09/21/2016    Abnormal bone radiograph    Cutaneous abscess of neck 09/23/2021    Neck abscess    Cyst of kidney, acquired 12/01/2015    Renal cyst    Old myocardial infarction     History of myocardial infarction    Old myocardial infarction 06/06/2013    Past myocardial infarction    Personal history of other diseases of the circulatory system     History of coronary artery disease    Personal history of other diseases of the circulatory system     History of high blood pressure    Personal history of other diseases of the digestive system     History of gastroesophageal reflux (GERD)    Personal history of other diseases of the digestive system     History of hiatal hernia    Personal history of other diseases of urinary system     History of kidney disease    Personal history of other mental and behavioral disorders     History of depression    Personal history of other specified conditions 09/02/2016    History of abdominal pain    Personal history of other specified conditions     History of chest pain    Personal history of other specified conditions     H/O shortness of breath     Past Surgical History:   Procedure Laterality Date    GASTRIC FUNDOPLICATION  02/25/2015    Esophagogastric  Fundoplasty Nissen Fundoplication    OTHER SURGICAL HISTORY  06/11/2013    Wrist Surgery    OTHER SURGICAL HISTORY  09/23/2021    Neck surgery    OTHER SURGICAL HISTORY  09/23/2021    Transurethral resection of prostate    ROTATOR CUFF REPAIR  06/11/2013    Rotator Cuff Repair    US GUIDED ABSCESS DRAIN  2/10/2017    US GUIDED ABSCESS DRAIN 2/10/2017 [facility]    US GUIDED PERCUTANEOUS PERITONEAL OR RETROPERITONEAL FLUID COLLECTION DRAINAGE  2/10/2017    US GUIDED PERCUTANEOUS PERITONEAL OR RETROPERITONEAL FLUID COLLECTION DRAINAGE 2/10/2017 [facility]     Family History   Problem Relation Name Age of Onset    Other (heart trouble) Mother      Kidney disease Father      Heart disease Father      Other (hypoglycemia) Father      Other (heart trouble) Father       Current Outpatient Medications   Medication Sig Dispense Refill    amLODIPine (Norvasc) 10 mg tablet Take 1 tablet (10 mg) by mouth once daily. 90 tablet 3    ascorbic acid, vitamin C, 1,000 mg ER tablet Take by mouth.      aspirin 81 mg EC tablet Take 1 tablet (81 mg) by mouth once daily.      atenolol (Tenormin) 25 mg tablet Take 1 tablet (25 mg) by mouth once daily. 90 tablet 3    atorvastatin (Lipitor) 40 mg tablet Take 1 tablet (40 mg) by mouth once daily. 90 tablet 3    calcium carbonate-vitamin D3 (Caltrate with Vitamin D3) 600 mg-20 mcg (800 unit) tablet Take by mouth twice a day.      carBAMazepine (TEGretol) 100 mg chewable tablet Chew 1 tablet (100 mg) early in the morning.. 90 tablet 3    cholecalciferol (Vitamin D3) 5,000 Units tablet Take 1 tablet (5,000 Units) by mouth 1 (one) time per week.      escitalopram (Lexapro) 20 mg tablet Take 1 tablet (20 mg) by mouth once daily. 90 tablet 3    esomeprazole (NexIUM) 20 mg DR capsule Take 1 capsule (20 mg) by mouth once daily. Do not open capsule.      losartan-hydrochlorothiazide (Hyzaar) 100-25 mg tablet Take 1 tablet by mouth once daily. 30 tablet 11    montelukast (Singulair)  10 mg tablet Take 1 tablet (10 mg) by mouth once daily. 90 tablet 3    mv-min-folic acid-lutein (Centrum Silver) 400-250 mcg tablet,chewable Chew 1 tablet once daily.      Myrbetriq 50 mg tablet extended release 24 hr 24 hr tablet Take by mouth.      tamsulosin (Flomax) 0.4 mg 24 hr capsule Take 1 capsule (0.4 mg) by mouth once daily. 90 each 0    traZODone (Desyrel) 100 mg tablet Take 1 tablet (100 mg) by mouth once daily at bedtime. 90 tablet 3     No current facility-administered medications for this visit.     No Known Allergies  Social History     Socioeconomic History    Marital status:      Spouse name: Not on file    Number of children: Not on file    Years of education: Not on file    Highest education level: Not on file   Occupational History    Not on file   Tobacco Use    Smoking status: Never    Smokeless tobacco: Never   Substance and Sexual Activity    Alcohol use: Not Currently    Drug use: Never    Sexual activity: Not on file   Other Topics Concern    Not on file   Social History Narrative    Not on file     Social Determinants of Health     Financial Resource Strain: Not on file   Food Insecurity: Not on file   Transportation Needs: Not on file   Physical Activity: Not on file   Stress: Not on file   Social Connections: Not on file   Intimate Partner Violence: Not on file   Housing Stability: Not on file       Review of Systems  Pertinent items are noted in HPI.    Objective       Lab Review  Lab Results   Component Value Date    WBC 6.3 03/06/2024    RBC 4.61 03/06/2024    HGB 14.2 03/06/2024    HCT 44.3 03/06/2024     03/06/2024      Lab Results   Component Value Date    BUN 37 (H) 03/06/2024    CREATININE 1.34 (H) 03/06/2024      Lab Results   Component Value Date    PSA 4.82 (H) 03/06/2024   IPSS 12 and 3   PVR= 37 ml   Uroflow, poorly diagnostic only voided 30cc's    Assessment/Plan   There are no diagnoses linked to this encounter.    BPH s/p TURP 07/2020 and PVP  05/05/2022.    Tri has no bothersome urinary symptoms.     We will continue to monitor.    Follow up annually.     History of Kd 6 prostate cancer on active surveillance - followed by Dr. Schmitt. Prostate MRI is pending.        All questions were answered to the patient's satisfaction. Patient agrees with the plan and wishes to proceed. Follow-up will be scheduled appropriately.     E&M visit today is associated with current or anticipated ongoing medical care services related to a patient's single, serious condition or a complex condition.    Scribed for Dr. Fuentes by Anais Banda. I , Dr Fuentes, have personally reviewed and agreed with the information entered by the Virtual Scribe.

## 2024-05-06 ENCOUNTER — OFFICE VISIT (OUTPATIENT)
Dept: UROLOGY | Facility: CLINIC | Age: 74
End: 2024-05-06
Payer: MEDICARE

## 2024-05-06 VITALS — TEMPERATURE: 97.4 F

## 2024-05-06 DIAGNOSIS — C61 PROSTATE CANCER (MULTI): ICD-10-CM

## 2024-05-06 DIAGNOSIS — N13.8 BPH WITH OBSTRUCTION/LOWER URINARY TRACT SYMPTOMS: Primary | ICD-10-CM

## 2024-05-06 DIAGNOSIS — N40.1 BPH WITH OBSTRUCTION/LOWER URINARY TRACT SYMPTOMS: Primary | ICD-10-CM

## 2024-05-06 PROCEDURE — 1159F MED LIST DOCD IN RCRD: CPT | Performed by: UROLOGY

## 2024-05-06 PROCEDURE — 51741 ELECTRO-UROFLOWMETRY FIRST: CPT | Performed by: UROLOGY

## 2024-05-06 PROCEDURE — 3044F HG A1C LEVEL LT 7.0%: CPT | Performed by: UROLOGY

## 2024-05-06 PROCEDURE — 99213 OFFICE O/P EST LOW 20 MIN: CPT | Performed by: UROLOGY

## 2024-05-06 PROCEDURE — 1160F RVW MEDS BY RX/DR IN RCRD: CPT | Performed by: UROLOGY

## 2024-05-06 PROCEDURE — 1036F TOBACCO NON-USER: CPT | Performed by: UROLOGY

## 2024-05-06 PROCEDURE — 1126F AMNT PAIN NOTED NONE PRSNT: CPT | Performed by: UROLOGY

## 2024-05-06 PROCEDURE — 3048F LDL-C <100 MG/DL: CPT | Performed by: UROLOGY

## 2024-05-06 PROCEDURE — 51798 US URINE CAPACITY MEASURE: CPT | Performed by: UROLOGY

## 2024-05-06 ASSESSMENT — PAIN SCALES - GENERAL: PAINLEVEL: 0-NO PAIN

## 2024-05-16 ENCOUNTER — APPOINTMENT (OUTPATIENT)
Dept: NEPHROLOGY | Facility: CLINIC | Age: 74
End: 2024-05-16
Payer: MEDICARE

## 2024-06-21 DIAGNOSIS — I10 HYPERTENSION, UNSPECIFIED TYPE: ICD-10-CM

## 2024-06-21 RX ORDER — LOSARTAN POTASSIUM AND HYDROCHLOROTHIAZIDE 25; 100 MG/1; MG/1
1 TABLET ORAL DAILY
Qty: 90 TABLET | Refills: 3 | Status: SHIPPED | OUTPATIENT
Start: 2024-06-21 | End: 2025-06-21

## 2024-07-01 DIAGNOSIS — E78.5 HYPERLIPIDEMIA, UNSPECIFIED HYPERLIPIDEMIA TYPE: ICD-10-CM

## 2024-07-01 RX ORDER — ATORVASTATIN CALCIUM 40 MG/1
40 TABLET, FILM COATED ORAL DAILY
Qty: 90 TABLET | Refills: 3 | Status: SHIPPED | OUTPATIENT
Start: 2024-07-01 | End: 2025-07-01

## 2024-07-08 ENCOUNTER — TELEPHONE (OUTPATIENT)
Dept: PRIMARY CARE | Facility: CLINIC | Age: 74
End: 2024-07-08
Payer: MEDICARE

## 2024-07-08 DIAGNOSIS — I10 HYPERTENSION, UNSPECIFIED TYPE: ICD-10-CM

## 2024-07-08 RX ORDER — LOSARTAN POTASSIUM AND HYDROCHLOROTHIAZIDE 25; 100 MG/1; MG/1
1 TABLET ORAL DAILY
Qty: 30 TABLET | Refills: 1 | Status: SHIPPED | OUTPATIENT
Start: 2024-07-08 | End: 2024-09-06

## 2024-07-08 NOTE — TELEPHONE ENCOUNTER
Refill request for Losartan/hydrochlorothiazide 100/25 mg 1 tablet daily sent to Dr Carter to review and send on to pharmacy.    Apt set for 08/29/24    Louie [phone]

## 2024-08-07 ENCOUNTER — OFFICE VISIT (OUTPATIENT)
Dept: NEUROLOGY | Facility: HOSPITAL | Age: 74
End: 2024-08-07
Payer: MEDICARE

## 2024-08-07 VITALS
SYSTOLIC BLOOD PRESSURE: 111 MMHG | WEIGHT: 225 LBS | TEMPERATURE: 97.1 F | RESPIRATION RATE: 18 BRPM | HEART RATE: 64 BPM | DIASTOLIC BLOOD PRESSURE: 70 MMHG | BODY MASS INDEX: 28.88 KG/M2 | HEIGHT: 74 IN

## 2024-08-07 DIAGNOSIS — M65.30 TRIGGER FINGER, UNSPECIFIED FINGER, UNSPECIFIED LATERALITY: ICD-10-CM

## 2024-08-07 DIAGNOSIS — R49.9 CHANGE IN VOICE: Primary | ICD-10-CM

## 2024-08-07 DIAGNOSIS — G50.0 TRIGEMINAL NEURALGIA: ICD-10-CM

## 2024-08-07 PROCEDURE — 3074F SYST BP LT 130 MM HG: CPT | Performed by: PSYCHIATRY & NEUROLOGY

## 2024-08-07 PROCEDURE — 3048F LDL-C <100 MG/DL: CPT | Performed by: PSYCHIATRY & NEUROLOGY

## 2024-08-07 PROCEDURE — 1159F MED LIST DOCD IN RCRD: CPT | Performed by: PSYCHIATRY & NEUROLOGY

## 2024-08-07 PROCEDURE — 1036F TOBACCO NON-USER: CPT | Performed by: PSYCHIATRY & NEUROLOGY

## 2024-08-07 PROCEDURE — 3078F DIAST BP <80 MM HG: CPT | Performed by: PSYCHIATRY & NEUROLOGY

## 2024-08-07 PROCEDURE — 99213 OFFICE O/P EST LOW 20 MIN: CPT | Performed by: PSYCHIATRY & NEUROLOGY

## 2024-08-07 PROCEDURE — 1126F AMNT PAIN NOTED NONE PRSNT: CPT | Performed by: PSYCHIATRY & NEUROLOGY

## 2024-08-07 PROCEDURE — 3044F HG A1C LEVEL LT 7.0%: CPT | Performed by: PSYCHIATRY & NEUROLOGY

## 2024-08-07 ASSESSMENT — PATIENT HEALTH QUESTIONNAIRE - PHQ9
SUM OF ALL RESPONSES TO PHQ9 QUESTIONS 1 AND 2: 0
2. FEELING DOWN, DEPRESSED OR HOPELESS: NOT AT ALL
1. LITTLE INTEREST OR PLEASURE IN DOING THINGS: NOT AT ALL

## 2024-08-07 ASSESSMENT — PAIN SCALES - GENERAL: PAINLEVEL: 0-NO PAIN

## 2024-08-07 NOTE — PROGRESS NOTES
Neuromuscular Medicine Follow Up     Adebayo Puentes, MRN: [id number], : 1950  Reason for Visit: No chief complaint on file.     Primary Care Physician: Mayda Carter MD     Impression/Plan:   Adebayo Puentes is here for follow-up of trigeminal neuralgia, treated with carbamazepine.    Also brings up voice hoarseness and trigger/contractures of fingers.     Plan:  - continue carbamazepine  - referral to orthopedic surgery for hand for fingers  - referral to ENT for voice hoarseness    Problem List Items Addressed This Visit    None  Visit Diagnoses       Change in voice    -  Primary    Relevant Orders    Referral to ENT    Trigger finger, unspecified finger, unspecified laterality        Relevant Orders    Referral to Orthopaedic Surgery            Lyssa Lo MD  Neuromuscular Neurology  [facility]  Office Phone Number: [phone]        History of Present Illness:      For trigeminal neuralgia, takes carbamazepine. He will take an extra carbamazepine once in a while for a flare-up    Not feeling as stable on feet.    He also reports voice gets softer at times and then becomes louder--a few months, tongue gets sore at the tip at times--sore right at the tip, almost feels raw. Speaking is not as clear at times.     Has been doing some balance exercises; he has restarted doing therapy exercises for balance in the past two weeks; he did not do anything for 5 weeks while traveling.     There are times when food has gone down the wrong tube.    Prior History:     Relevant past medical, surgical, family, and social histories, along with ROS was reviewed and pertinent details noted above.     No Known Allergies   Medications:    Current Outpatient Medications:     amLODIPine (Norvasc) 10 mg tablet, Take 1 tablet (10 mg) by mouth once daily., Disp: 90 tablet, Rfl: 3    ascorbic acid, vitamin C, 1,000 mg ER tablet, Take by mouth., Disp: , Rfl:     aspirin 81 mg EC tablet, Take 1 tablet (81 mg) by mouth once  "daily., Disp: , Rfl:     atenolol (Tenormin) 25 mg tablet, Take 1 tablet (25 mg) by mouth once daily., Disp: 90 tablet, Rfl: 3    atorvastatin (Lipitor) 40 mg tablet, Take 1 tablet (40 mg) by mouth once daily., Disp: 90 tablet, Rfl: 3    calcium carbonate-vitamin D3 (Caltrate with Vitamin D3) 600 mg-20 mcg (800 unit) tablet, Take by mouth twice a day., Disp: , Rfl:     carBAMazepine (TEGretol) 100 mg chewable tablet, Chew 1 tablet (100 mg) early in the morning.., Disp: 90 tablet, Rfl: 3    cholecalciferol (Vitamin D3) 5,000 Units tablet, Take 1 tablet (5,000 Units) by mouth 1 (one) time per week., Disp: , Rfl:     escitalopram (Lexapro) 20 mg tablet, Take 1 tablet (20 mg) by mouth once daily., Disp: 90 tablet, Rfl: 3    esomeprazole (NexIUM) 20 mg DR capsule, Take 1 capsule (20 mg) by mouth once daily. Do not open capsule., Disp: , Rfl:     losartan-hydrochlorothiazide (Hyzaar) 100-25 mg tablet, Take 1 tablet by mouth once daily., Disp: 30 tablet, Rfl: 1    montelukast (Singulair) 10 mg tablet, Take 1 tablet (10 mg) by mouth once daily., Disp: 90 tablet, Rfl: 3    multivitamin with minerals iron-free (Centrum Silver), Take 1 tablet by mouth., Disp: , Rfl:     mv-min-folic acid-lutein (Centrum Silver) 400-250 mcg tablet,chewable, Chew 1 tablet once daily., Disp: , Rfl:     Myrbetriq 50 mg tablet extended release 24 hr 24 hr tablet, Take by mouth., Disp: , Rfl:     traZODone (Desyrel) 100 mg tablet, Take 1 tablet (100 mg) by mouth once daily at bedtime., Disp: 90 tablet, Rfl: 3       Physical Exam:   /70   Pulse 64   Temp 36.2 °C (97.1 °F)   Resp 18   Ht 1.88 m (6' 2\")   Wt 102 kg (225 lb)   BMI 28.89 kg/m²      Exam:    Has triggered finger contracture of digit 4 on the right and little finger on the left hand.    Alert and awake    Strength strong in arms and legs 5 proximally and distally    Face symmetric, smile symmetric    Sensation intact to light touch in face, arms and " legs    -----------------------------------------------------------------------------------------------------------------------------  ATTENDING ATTESTATION      I personally spent 25 minutes on the day of the visit completing the review of the medical record and outside records, obtaining history and performing an appropriate physical exam, patient care, counseling and education, placing orders, communicating with the patient, coordinating care.    Lyssa Lo MD  Neuromuscular Neurology  [facility]  Office Phone Number: [phone]

## 2024-08-13 ENCOUNTER — OFFICE VISIT (OUTPATIENT)
Dept: OTOLARYNGOLOGY | Facility: CLINIC | Age: 74
End: 2024-08-13
Payer: MEDICARE

## 2024-08-13 VITALS — BODY MASS INDEX: 29.61 KG/M2 | HEIGHT: 74 IN | WEIGHT: 230.7 LBS | TEMPERATURE: 97 F

## 2024-08-13 DIAGNOSIS — R49.0 HOARSENESS OF VOICE: Primary | ICD-10-CM

## 2024-08-13 DIAGNOSIS — R49.9 CHANGE IN VOICE: ICD-10-CM

## 2024-08-13 DIAGNOSIS — R13.10 DYSPHAGIA, UNSPECIFIED TYPE: ICD-10-CM

## 2024-08-13 DIAGNOSIS — J38.7 PRESBYLARYNGES: ICD-10-CM

## 2024-08-13 DIAGNOSIS — R49.0 MUSCLE TENSION DYSPHONIA: ICD-10-CM

## 2024-08-13 PROCEDURE — 3044F HG A1C LEVEL LT 7.0%: CPT

## 2024-08-13 PROCEDURE — 1036F TOBACCO NON-USER: CPT

## 2024-08-13 PROCEDURE — 31579 LARYNGOSCOPY TELESCOPIC: CPT

## 2024-08-13 PROCEDURE — 99203 OFFICE O/P NEW LOW 30 MIN: CPT

## 2024-08-13 PROCEDURE — 3048F LDL-C <100 MG/DL: CPT

## 2024-08-13 PROCEDURE — 1159F MED LIST DOCD IN RCRD: CPT

## 2024-08-13 PROCEDURE — 1160F RVW MEDS BY RX/DR IN RCRD: CPT

## 2024-08-13 NOTE — PROGRESS NOTES
"Reason For Consult  Chief Complaint   Patient presents with    Referral     Neurologist referred, for losing voice         HISTORY OF PRESENT ILLNESS:  This is a request for consultation from Dr. Lyssa Lo for Adebayo Puentes \"Ed\", who is a 74 y.o. male presenting for an initial visit for hoarseness of voice and dysphagia.  The patient reports that he has been experiencing hoarseness of voice 2-3 times per week and has become more frequent over the last 4-6 months. The patient states that he will have hoarseness and will strain to project the voice, which then causes him voice fatigue. The patient also reports swallowing issues with tougher meats (chicken and steak) and liquids will cause him to cough. The patient reports that this occurs 1-2 times per week. The patient states that tolerates pills. The patient denies any shortness of breath. Patient reports he will have occasional symptoms of acid reflux, but feels it is generally well controlled. The patient denies any smoking history.     Past Medical History  He has a past medical history of Abnormal findings on diagnostic imaging of other parts of musculoskeletal system (09/21/2016), Cutaneous abscess of neck (09/23/2021), Cyst of kidney, acquired (12/01/2015), Old myocardial infarction, Old myocardial infarction (06/06/2013), Personal history of other diseases of the circulatory system, Personal history of other diseases of the circulatory system, Personal history of other diseases of the digestive system, Personal history of other diseases of the digestive system, Personal history of other diseases of urinary system, Personal history of other mental and behavioral disorders, Personal history of other specified conditions (09/02/2016), Personal history of other specified conditions, and Personal history of other specified conditions.  Surgical History  He has a past surgical history that includes Other surgical history (06/11/2013); Rotator cuff repair " "(2013); Other surgical history (2021); Other surgical history (2021); Gastric fundoplication (2015); US guided percutaneous peritoneal or retroperitoneal fluid collection drainage (2/10/2017); and US guided abscess drain (2/10/2017).     Social History  He reports that he has never smoked. He has never been exposed to tobacco smoke. He has never used smokeless tobacco. He reports that he does not currently use alcohol. He reports that he does not use drugs.    Allergies  Patient has no known allergies.    Review of Systems  All 10 systems were reviewed and negative except for above.      Physical Exam  CONSTITUTIONAL: Well developed, well nourished.    VOICE: mild hoarseness  RESPIRATION: Breathing comfortably, no stridor.    NEURO: Alert and oriented x3.  EARS: Normal external ears, external auditory canals, normal hearing to conversational voice.    NOSE: External nose midline, anterior rhinoscopy is normal with limited visualization to the anterior aspect of the interior turbinates. No lesions noted.     ORAL CAVITY/OROPHARYNX/LIPS: Normal mucous membranes, normal floor of mouth/tongue/OP, no masses or lesions are noted.    SKIN: Neck skin is normal  PSYCH: Alert and oriented with appropriate mood and affect.        Last Recorded Vitals  Temperature 36.1 °C (97 °F), height 1.88 m (6' 2\"), weight 105 kg (230 lb 11.2 oz).    Procedure  PROCEDURE NOTE:  Recommended flexible laryngoscopy/stroboscopy.  Risks, benefits,  and alternatives were explained.  They wish to proceed and provide verbal consent.     PROCEDURE:  Flexible laryngoscopy with stroboscopy, CPT 56957     INDICATIONS: Inability to tolerate mirror exam or abnormal findings on mirror, Flexible Laryngoscopy/Stroboscopy performed to assess one of the followin. Diagnosis of symptomatic disorder involving the voice, swallow, upper aerodigestive tract, including MELINDA disorders, or  2. Preoperative evaluation of vocal cord function " for individuals undergoing surgery where the RLN or vagus nerves are at risk of injury, or  3. Further evaluation of abnormalities of the upper aerodigestive tract discovered by another modality, such as CT, MRI, bronchoscopy or EGD    Description of Procedure:    After adequate afrin and lidocaine spray, I advanced the endoscope.  Visualization of the nasopharynx, vallecula, posterior pharyngeal walls, pyriform, epiglottis and post cricoid areas was unremarkable.  The following laryngeal findings were noted:    vocal cord movement was mild weakness left VC, right side VC normal  closure was slight variable bowing  Mucosal wave was difficult to assess due to increased compression AP and FVC.  Compression was increased AP=FVC (L>R)  edema was none  interarytenoid edema   lesions were none  the subglottis was widely patent  Pharyngeal wall squeeze was normal    Procedure well tolerated.     ASSESSMENT AND PLAN:   This is an initial visit for hoarseness of voice with clinical findings notable for increased muscle tension AP and FVC (L>R). Variable glottic insufficiency. 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 Discussed with patient possibility of vocal cord injection to help with voice quality. Patient wishes to start with speech therapy first. Will call patient with results from modified barium swallow and next steps.    Diagnoses are exacerbated by: increased talking worsens hoarseness and voice fatigue    We discussed the treatment options to include, medical and surgical options.  We have decided to proceed as follows:   Modified Barium Swallow ordered.   Voice therapy for MTD. Discussed future vocal cord injection, patient would like to start with speech therapy first.  [place] water gargles daily.   Will call patient with results from MBSS and next steps.

## 2024-08-13 NOTE — LETTER
"August 13, 2024     Lyssa Lo MD  [id number]  Department Of Neurology  [facility] [address]    Patient: Sachin Puentes   YOB: 1950   Date of Visit: 8/13/2024       Dear Dr. Lyssa Lo MD:    Thank you for referring Sachin Puentes to me for evaluation. Below are my notes for this consultation.  If you have questions, please do not hesitate to call me. I look forward to following your patient along with you.       Sincerely,     David Johnson, APRN-CNP      CC: No Recipients  ______________________________________________________________________________________    Reason For Consult  Chief Complaint   Patient presents with   • Referral     Neurologist referred, for losing voice         HISTORY OF PRESENT ILLNESS:  This is a request for consultation from Dr. Lyssa Lo for Adebayo Puentes \"Ed\", who is a 74 y.o. male presenting for an initial visit for hoarseness of voice and dysphagia.  The patient reports that he has been experiencing hoarseness of voice 2-3 times per week and has become more frequent over the last 4-6 months. The patient states that he will have hoarseness and will strain to project the voice, which then causes him voice fatigue. The patient also reports swallowing issues with tougher meats (chicken and steak) and liquids will cause him to cough. The patient reports that this occurs 1-2 times per week. The patient states that tolerates pills. The patient denies any shortness of breath. Patient reports he will have occasional symptoms of acid reflux, but feels it is generally well controlled. The patient denies any smoking history.     Past Medical History  He has a past medical history of Abnormal findings on diagnostic imaging of other parts of musculoskeletal system (09/21/2016), Cutaneous abscess of neck (09/23/2021), Cyst of kidney, acquired (12/01/2015), Old myocardial infarction, Old myocardial infarction (06/06/2013), Personal history of other diseases of the " "circulatory system, Personal history of other diseases of the circulatory system, Personal history of other diseases of the digestive system, Personal history of other diseases of the digestive system, Personal history of other diseases of urinary system, Personal history of other mental and behavioral disorders, Personal history of other specified conditions (09/02/2016), Personal history of other specified conditions, and Personal history of other specified conditions.  Surgical History  He has a past surgical history that includes Other surgical history (06/11/2013); Rotator cuff repair (06/11/2013); Other surgical history (09/23/2021); Other surgical history (09/23/2021); Gastric fundoplication (02/25/2015); US guided percutaneous peritoneal or retroperitoneal fluid collection drainage (2/10/2017); and US guided abscess drain (2/10/2017).     Social History  He reports that he has never smoked. He has never been exposed to tobacco smoke. He has never used smokeless tobacco. He reports that he does not currently use alcohol. He reports that he does not use drugs.    Allergies  Patient has no known allergies.    Review of Systems  All 10 systems were reviewed and negative except for above.      Physical Exam  CONSTITUTIONAL: Well developed, well nourished.    VOICE: mild hoarseness  RESPIRATION: Breathing comfortably, no stridor.    NEURO: Alert and oriented x3.  EARS: Normal external ears, external auditory canals, normal hearing to conversational voice.    NOSE: External nose midline, anterior rhinoscopy is normal with limited visualization to the anterior aspect of the interior turbinates. No lesions noted.     ORAL CAVITY/OROPHARYNX/LIPS: Normal mucous membranes, normal floor of mouth/tongue/OP, no masses or lesions are noted.    SKIN: Neck skin is normal  PSYCH: Alert and oriented with appropriate mood and affect.        Last Recorded Vitals  Temperature 36.1 °C (97 °F), height 1.88 m (6' 2\"), weight 105 kg " (230 lb 11.2 oz).    Procedure  PROCEDURE NOTE:  Recommended flexible laryngoscopy/stroboscopy.  Risks, benefits,  and alternatives were explained.  They wish to proceed and provide verbal consent.     PROCEDURE:  Flexible laryngoscopy with stroboscopy, CPT 09429     INDICATIONS: Inability to tolerate mirror exam or abnormal findings on mirror, Flexible Laryngoscopy/Stroboscopy performed to assess one of the followin. Diagnosis of symptomatic disorder involving the voice, swallow, upper aerodigestive tract, including MELINDA disorders, or  2. Preoperative evaluation of vocal cord function for individuals undergoing surgery where the RLN or vagus nerves are at risk of injury, or  3. Further evaluation of abnormalities of the upper aerodigestive tract discovered by another modality, such as CT, MRI, bronchoscopy or EGD    Description of Procedure:    After adequate afrin and lidocaine spray, I advanced the endoscope.  Visualization of the nasopharynx, vallecula, posterior pharyngeal walls, pyriform, epiglottis and post cricoid areas was unremarkable.  The following laryngeal findings were noted:    vocal cord movement was mild weakness left VC, right side VC normal  closure was slight variable bowing  Mucosal wave was difficult to assess due to increased compression AP and FVC.  Compression was increased AP=FVC (L>R)  edema was none  interarytenoid edema   lesions were none  the subglottis was widely patent  Pharyngeal wall squeeze was normal    Procedure well tolerated.     ASSESSMENT AND PLAN:   This is an initial visit for hoarseness of voice with clinical findings notable for increased muscle tension AP and FVC (L>R). Variable glottic insufficiency. 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 Discussed with patient possibility of vocal cord injection to help with voice quality. Patient wishes to start with speech therapy first. Will call patient with results from modified  barium swallow and next steps.    Diagnoses are exacerbated by: increased talking worsens hoarseness and voice fatigue    We discussed the treatment options to include, medical and surgical options.  We have decided to proceed as follows:   Modified Barium Swallow ordered.   Voice therapy for MTD. Discussed future vocal cord injection, patient would like to start with speech therapy first.  [place] water gargles daily.   Will call patient with results from MBSS and next steps.

## 2024-08-13 NOTE — LETTER
"August 13, 2024     Lyssa Lo MD  [id number]  Department Of Neurology  [facility] [address]    Patient: Sachin Puentes   YOB: 1950   Date of Visit: 8/13/2024       Dear Dr. Lyssa Lo MD:    Thank you for referring Sachin Puentes to me for evaluation. Below are my notes for this consultation.  If you have questions, please do not hesitate to call me. I look forward to following your patient along with you.       Sincerely,     David Johnson, APRN-CNP      CC: No Recipients  ______________________________________________________________________________________    Reason For Consult  Chief Complaint   Patient presents with    Referral     Neurologist referred, for losing voice         HISTORY OF PRESENT ILLNESS:  This is a request for consultation from Dr. Lyssa Lo for Adebayo Puentes \"Ed\", who is a 74 y.o. male presenting for an initial visit for hoarseness of voice and dysphagia.  The patient reports that he has been experiencing hoarseness of voice 2-3 times per week and has become more frequent over the last 4-6 months. The patient states that he will have hoarseness and will strain to project the voice, which then causes him voice fatigue. The patient also reports swallowing issues with tougher meats (chicken and steak) and liquids will cause him to cough. The patient reports that this occurs 1-2 times per week. The patient states that tolerates pills. The patient denies any shortness of breath. Patient reports he will have occasional symptoms of acid reflux, but feels it is generally well controlled. The patient denies any smoking history.     Past Medical History  He has a past medical history of Abnormal findings on diagnostic imaging of other parts of musculoskeletal system (09/21/2016), Cutaneous abscess of neck (09/23/2021), Cyst of kidney, acquired (12/01/2015), Old myocardial infarction, Old myocardial infarction (06/06/2013), Personal history of other diseases of the " "circulatory system, Personal history of other diseases of the circulatory system, Personal history of other diseases of the digestive system, Personal history of other diseases of the digestive system, Personal history of other diseases of urinary system, Personal history of other mental and behavioral disorders, Personal history of other specified conditions (09/02/2016), Personal history of other specified conditions, and Personal history of other specified conditions.  Surgical History  He has a past surgical history that includes Other surgical history (06/11/2013); Rotator cuff repair (06/11/2013); Other surgical history (09/23/2021); Other surgical history (09/23/2021); Gastric fundoplication (02/25/2015); US guided percutaneous peritoneal or retroperitoneal fluid collection drainage (2/10/2017); and US guided abscess drain (2/10/2017).     Social History  He reports that he has never smoked. He has never been exposed to tobacco smoke. He has never used smokeless tobacco. He reports that he does not currently use alcohol. He reports that he does not use drugs.    Allergies  Patient has no known allergies.    Review of Systems  All 10 systems were reviewed and negative except for above.      Physical Exam  CONSTITUTIONAL: Well developed, well nourished.    VOICE: mild hoarseness  RESPIRATION: Breathing comfortably, no stridor.    NEURO: Alert and oriented x3.  EARS: Normal external ears, external auditory canals, normal hearing to conversational voice.    NOSE: External nose midline, anterior rhinoscopy is normal with limited visualization to the anterior aspect of the interior turbinates. No lesions noted.     ORAL CAVITY/OROPHARYNX/LIPS: Normal mucous membranes, normal floor of mouth/tongue/OP, no masses or lesions are noted.    SKIN: Neck skin is normal  PSYCH: Alert and oriented with appropriate mood and affect.        Last Recorded Vitals  Temperature 36.1 °C (97 °F), height 1.88 m (6' 2\"), weight 105 kg " (230 lb 11.2 oz).    Procedure  PROCEDURE NOTE:  Recommended flexible laryngoscopy/stroboscopy.  Risks, benefits,  and alternatives were explained.  They wish to proceed and provide verbal consent.     PROCEDURE:  Flexible laryngoscopy with stroboscopy, CPT 46718     INDICATIONS: Inability to tolerate mirror exam or abnormal findings on mirror, Flexible Laryngoscopy/Stroboscopy performed to assess one of the followin. Diagnosis of symptomatic disorder involving the voice, swallow, upper aerodigestive tract, including MELINDA disorders, or  2. Preoperative evaluation of vocal cord function for individuals undergoing surgery where the RLN or vagus nerves are at risk of injury, or  3. Further evaluation of abnormalities of the upper aerodigestive tract discovered by another modality, such as CT, MRI, bronchoscopy or EGD    Description of Procedure:    After adequate afrin and lidocaine spray, I advanced the endoscope.  Visualization of the nasopharynx, vallecula, posterior pharyngeal walls, pyriform, epiglottis and post cricoid areas was unremarkable.  The following laryngeal findings were noted:    vocal cord movement was slight weakness left VC, right side normal  closure was slight variable bowing  Mucosal wave was difficult to assess due to increased compression AP and FVC.  Compression was increased AP=FVC (L>R)  edema was none  interarytenoid edema   lesions were none  the subglottis was widely patent  Pharyngeal wall squeeze was normal    Procedure well tolerated.     ASSESSMENT AND PLAN:   This is an initial visit for hoarseness of voice with clinical findings notable for increased muscle tension AP and FVC (L>R). Variable glottic insufficiency. 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 Discussed with patient possibility of vocal cord injection to help with voice quality. Patient wishes to start with speech therapy first. Will call patient with results from modified  barium swallow and next steps.    Diagnoses are exacerbated by: increased talking worsens hoarseness and voice fatigue    We discussed the treatment options to include, medical and surgical options.  We have decided to proceed as follows:   Modified Barium Swallow ordered.   Voice therapy for MTD. Discussed future vocal cord injection, patient would like to start with speech therapy first.  [place] water gargles daily.   Will call patient with results from MBSS and next steps.

## 2024-08-19 ENCOUNTER — EVALUATION (OUTPATIENT)
Dept: SPEECH THERAPY | Facility: CLINIC | Age: 74
End: 2024-08-19
Payer: MEDICARE

## 2024-08-19 DIAGNOSIS — C61 PROSTATE CANCER (MULTI): ICD-10-CM

## 2024-08-19 DIAGNOSIS — J38.3 GLOTTIC INSUFFICIENCY: ICD-10-CM

## 2024-08-19 DIAGNOSIS — R49.0 MUSCLE TENSION DYSPHONIA: ICD-10-CM

## 2024-08-19 DIAGNOSIS — J38.02 BILATERAL VOCAL CORD PARESIS: Primary | ICD-10-CM

## 2024-08-19 PROCEDURE — 92524 BEHAVRAL QUALIT ANALYS VOICE: CPT | Mod: GN

## 2024-08-19 RX ORDER — SODIUM CHLORIDE, SODIUM LACTATE, POTASSIUM CHLORIDE, CALCIUM CHLORIDE 600; 310; 30; 20 MG/100ML; MG/100ML; MG/100ML; MG/100ML
20 INJECTION, SOLUTION INTRAVENOUS CONTINUOUS
OUTPATIENT
Start: 2024-08-19

## 2024-08-19 ASSESSMENT — PAIN - FUNCTIONAL ASSESSMENT: PAIN_FUNCTIONAL_ASSESSMENT: 0-10

## 2024-08-19 ASSESSMENT — PAIN SCALES - GENERAL: PAINLEVEL_OUTOF10: 0 - NO PAIN

## 2024-08-19 NOTE — PROGRESS NOTES
"Speech-Language Pathology    Voice Evaluation    Patient Name: Adebayo Puentes \"Ed\"  MRN: [id number]  Today's Date: 8/19/2024     Time Calculation  Start Time: 0945  Stop Time: 1030  Time Calculation (min): 45 min      Current Problem:  Patient Active Problem List   Diagnosis    BPH with obstruction/lower urinary tract symptoms    BPH (benign prostatic hyperplasia)    CAD (coronary artery disease)    Elevated PSA    Fracture of unspecified part of neck of right femur, subsequent encounter for closed fracture with routine healing    Headache    Hyperlipidemia    Hypertension    Hypercalcemia    Impaired fasting blood sugar    Mild vitamin D deficiency    Recurrent major depressive disorder ([facility])    S/P rotator cuff repair    Seasonal allergies    Ventral hernia without obstruction or gangrene    Rising PSA level    Abnormal urinary stream    Urinary urgency    Acetabular fracture (Multi)    Acetabulum fracture, right (Multi)    Bilateral sensorineural hearing loss    Chronic throat clearing    Persistent cough    Complete tear of right rotator cuff    Depression    Difficulty in walking, not elsewhere classified    Dysphagia    Elevated serum creatinine    Erectile dysfunction    GERD (gastroesophageal reflux disease)    Globus sensation    Hemorrhage of ear canal    Hemorrhoids    Hypertrophy of inferior nasal turbinate    Idiopathic peripheral neuropathy    Imbalance    Laryngopharyngeal reflux (LPR)    Muscle weakness (generalized)    Neck mass    Nephrolithiasis    Renal cyst    Otalgia, right    Prostate cancer (Multi)    Recurrent shoulder dislocation, right    Right trigeminal neuralgia    Trigeminus neuralgia    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    IMPRESSIONS  Patient presents with dysphonia 2/2 a diagnosis of bilateral vocal cord paresis with glottic insufficiency and compensatory MTD. Patient appears to be an excellent candidate for therapy which will target voice recalibration via increasing coordination between respiration and phonation.     Swallow goals will be added as indicated following upcoming MBS.     Voice quality based on the GRBAS scale: 0=absent; 1=mild; 2=moderate; 3=severe    ndGndrndanddndend:nd nd2nd Roughness: 1    Breathiness: 0    Asthenia: 0    Strain: 1-2    CONTRIBUTING FACTORS  Supraglottic compression/MTD  Decreased neuromuscular control of speech muscles  Inadequate intake of fluids for hydration    HISTORY/REASON FOR REFERRAL  This is a request for consultation from David Johnson CNP for Adebayo TATYANA Dhaval \"Ed\", who is a 74 y.o. male presenting for an initial visit for muscle tension dysphonia.  The patient reports that he has been experiencing hoarseness of voice 2-3 times per week and has become more frequent over the last 6-8 months. The patient states that he will have hoarseness and will strain to project the voice, which then causes him voice fatigue. He was diagnosed with trigeminal neuralgia about 1 year ago and being treated by neurology with carbamazepine. His symptoms also flare, similar to his intermittent voice changes. He is retired for the past several years. 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 Since nursing home he admits to not being as active.     The patient also reports swallowing issues with tougher meats (chicken and steak) and liquids will cause him to cough. The patient reports that this occurs 1-2 times per week. The patient states that he tolerates pills. The patient denies any shortness of breath. Patient reports " "he will have occasional symptoms of acid reflux, but feels it is generally well controlled. He points to his sternum as the where he feels the food sticks. A MBS was ordered and is scheduled for 8/20/24.     ASSESSMENT  Pain Assessment:  Pain Assessment: 0-10  0-10 (Numeric) Pain Score: 0 - No pain    VHI-10:  My voice makes it difficult for people to hear me.: Sometimes  People have difficulty understanding me in a noisy room.: Sometimes  My voice difficulties restrict my personal and social life.: Almost never  I feel left out of the conversations because of my voice: Sometimes  My voice problem causes me to lose income.: Never  I feel as though I have to strain to produce voice.: Sometimes  The clarity of my voice is unpredictable.: Sometimes  My voice problem upsets me.: Almost never  My voice makes me feel handicapped.: Never  People ask, \"What's wrong with your voice?\": Almost never  VHI-10 Total Score: 13    PERCEPTUAL VOICE FEATURES  Intonation:  Loudness:  Nasal resonance:    Additional vocal characteristics noted included:  + laryngeal tremor noted at times    FLEXIBLE LARYNGOSCOPY WITH STROBOSCOPY ([facility], 8/13/24)  Vocal fold mobility: normal; left falling out before the right  Glottic closure: incomplete with mid glottic gap  Mucosal wave: reduced bilaterally  Secretions: no pooling  Supraglottic compression: AP and FVC; sphincteric at times  Vascularity: unremarkable  Mucosal edge: smooth bilaterally  Interarytenoid edema: none  Subglottis: widely patent  Pharyngeal contraction: normal    TREATMENT  Diagnostic therapy completed to determine most effective and efficient tool to recalibrate coordination between respiration and phonation. Most favorable technique this date was RVT bubble blowing, lip/tongue trilling and humming with a lingual anchor. Max multimodality cueing, instruction in proper posture and repeated trials improved patient performance accuracy. Patient unable to coordination to initiate " PhoRTE this date with max MTD. Encouraged home practice several times a day for limited duration.     Handout provided to facilitate optimal home carryover.     PLAN OF CARE  Frequency: 2-4 x/month  Duration: 2 months    LONG TERM GOALS  Improve overall vocal health to foster increased participation levels at home, work and in the community environment.  Patient will demonstrate reduction in symptoms as evidenced by improved scores on VHI-10, RSI, and/or CSI following treatment.    SHORT TERM GOALS  Patient will increase vocal wellness and decrease phono trauma in adherence with clinician prescribed vocal hygiene and wellness program per patient report 80% of his/her day.  Patient will increase ability to produce voice without tension within 5 minute conversational task x 80% accuracy as judged by clinician observation and/or patient report.  Patient will demonstrate independent use of voice techniques x 80% accuracy.  Patient will increase the balance of the respiratory/laryngeal musculature x 80% accuracy.    RECOMMENDATIONS FOR THERAPEUTIC INTERVENTIONS  Speech/voice exercises  Vocal hygiene program  Swallow exercises/Safe swallow guidelines (based on [facility] outcome goals will be updated as indicated)    POTENTIAL FOR IMPROVEMENT  Good    FACTORS AFFECTING PROGNOSIS  None    DISCUSSED PLAN OF CARE WITH patient  DISCUSSED RISK/BENEFITS WITH patient  PATIENT/CAREGIVER AGREEABLE WITH PLAN.

## 2024-08-20 ENCOUNTER — HOSPITAL ENCOUNTER (OUTPATIENT)
Dept: RADIOLOGY | Facility: HOSPITAL | Age: 74
Discharge: HOME | End: 2024-08-20
Payer: MEDICARE

## 2024-08-20 DIAGNOSIS — R13.10 DYSPHAGIA, UNSPECIFIED TYPE: ICD-10-CM

## 2024-08-20 PROCEDURE — 74230 X-RAY XM SWLNG FUNCJ C+: CPT

## 2024-08-20 PROCEDURE — 74230 X-RAY XM SWLNG FUNCJ C+: CPT | Performed by: STUDENT IN AN ORGANIZED HEALTH CARE EDUCATION/TRAINING PROGRAM

## 2024-08-20 PROCEDURE — 92611 MOTION FLUOROSCOPY/SWALLOW: CPT | Mod: GN

## 2024-08-20 PROCEDURE — 2500000005 HC RX 250 GENERAL PHARMACY W/O HCPCS

## 2024-08-20 NOTE — PROGRESS NOTES
"Speech-Language Pathology    Speech-Language Pathology    Outpatient Modified Barium Swallow Study    Patient Name: Adebayo Puentes \"Ed\"  MRN: [id number]  : 1950  Today's Date: 24         Modified Barium Swallow Study completed. Informed verbal consent obtained prior to completion of exam. Trials of thin, nectar/mildly thick liquid, honey/moderately thick liquid, puree, solids and barium tablet with thin liquid were administered.     SLP: Fatoumata Cordova, SLP   Contact info: [employer] chat.      Reason for Referral: Coughing reported with liquids. Endorses globus sensation with meats.  Patient Hx: trigeminal neuralgia (treated with carbamazepine)  Respiratory Status: Room air  Current diet: no restrictions    Pain:  Pain Scale: 0-10  Ratin    FINAL SPEECH RECOMMENDATIONS    DIET RECOMMENDED:   - Regular (IDDSI Level 7)- AVOID MIXED CONSISTENCIES  - Thin liquids (IDDSI Level 0)  *MEDS WHOLE IN PUREE CARRIER    Patient to continue modified diet with implementation of the following swallow strategies listed below:    STRATEGIES:  - Small bites  - Small, single sips  - Upright for all PO intake  - Medications whole in puree or as best tolerated  - Slow rate    Plan:  Treatment/Interventions: Patient/family education, Bolus trials, Compensatory strategy training, Determination of diet tolerance.  SLP Plan: Skilled SLP warranted  SLP Frequency: To be determined by treating SLP in outpatient setting  Duration:    Discussed POC: Patient  Discussed Risks/Benefits: Yes  Patient/Caregiver Agreeable: Yes    Short term goals established 24:   - Patient will implement safe swallowing strategies to reduce risk of aspiration and other s/sx of dysphagia in 90% of trials given caregiver assistance/cueing as needed.      Long term goals 24:   Patient will tolerate current diet without overt difficulty in 90% of opportunities.    Education Provided:  Reviewed results and recommendations of MBSS with " video review. Discussed recommendations and reviewed POC at time of assessment. Instructed patient in compensatory swallowing strategies with caveat to avoid mixed consistency foods. Verbal understanding confirmed with agreement of all information presented being acknowledged by patient.     Treatment Provided Today: N/A    Additional consult suggested: ENT/Neurologist    Repeat study/ dc plan: TBD    SLP Impressions with Severity Rating:   Pt presents with mild-moderate oropharyngeal dysphagia characterized by reduced bolus formation with liquids. Premature pharyngeal entry viewed with thin liquids extending to piriform sinuses. Nectar and honey liquids extended to valleculae. Laryngeal penetration viewed with thin liquids given second tsp bolus. This fully cleared upper laryngeal vestibule as reswallow completed. Silent aspiration occurred with thin liquids when consumed with barium tablet as multiple boluses consumed continuously via cup. No further occurrence identified when subsequent boluses thin liquid consumed via single cup sips using slow rate. No airway entry viewed with nectar/honey liquid or puree/solid boluses. Oral residue noted with solids and liquids. Oral content with liquids filled pharynx post swallow. ? Outpouching in hypopharynx with brief retention of liquid barium in this area. When viewed AP, R>L piriform sinus residue noted with R side bolus transit. Esophageal column clear by end of exam.     Strategies attempted- n/a      OUTCOME MEASURES:  Functional Oral Intake Scale  Functional Oral Intake Scale: Level 6        total oral diet with multiple consistencies without special preparations but specific food limitations       Eating Assessment Tool (EAT-10)   EAT 10 not utilized due to time constraints.     Rosenbek's Penetration Aspiration Scale  Thin Liquids: 8. SILENT ASPIRATION - contrast passes glottis, visible residue, NO pt response]   During the Swallow    Nectar Thick Liquids: 1. NO  ASPIRATION & NO PENETRATION - no aspiration, contrast does not enter airway    Honey Thick Liquids: 1. NO ASPIRATION & NO PENETRATION - no aspiration, contrast does not enter airway    Puree: 1. NO ASPIRATION & NO PENETRATION - no aspiration, contrast does not enter airway    Solids: 1. NO ASPIRATION & NO PENETRATION - no aspiration, contrast does not enter airway

## 2024-08-20 NOTE — PROGRESS NOTES
"Nephrology Outpatient Follow-up Patient Note    Chief Concern:  Follow-up for CKD    History Of Present Illness   Adebayo Puentes \"Ed\" is a 74 y.o. male with a history of  CKD, CAD, HTN, HLP, BPH s/p TURP on 2020, recent echo normal  - last time seen on 12/2022 by Dr Jim, non proteinuric CKD stage 3a+HTN  Last labs from 3/2024 with Scr at baseline ~1.3/eGFR 56, last urine from 2020, no BMD labs   On losartan+hydrochlorothiazide, amlodipine and losartan  He fells OK, no edema, no SOB      Past Medical History  He has a past medical history of Abnormal findings on diagnostic imaging of other parts of musculoskeletal system (09/21/2016), Cutaneous abscess of neck (09/23/2021), Cyst of kidney, acquired (12/01/2015), Old myocardial infarction, Old myocardial infarction (06/06/2013), Personal history of other diseases of the circulatory system, Personal history of other diseases of the circulatory system, Personal history of other diseases of the digestive system, Personal history of other diseases of the digestive system, Personal history of other diseases of urinary system, Personal history of other mental and behavioral disorders, Personal history of other specified conditions (09/02/2016), Personal history of other specified conditions, and Personal history of other specified conditions.  Patient Active Problem List   Diagnosis    BPH with obstruction/lower urinary tract symptoms    BPH (benign prostatic hyperplasia)    CAD (coronary artery disease)    Elevated PSA    Fracture of unspecified part of neck of right femur, subsequent encounter for closed fracture with routine healing    Headache    Hyperlipidemia    Hypertension    Hypercalcemia    Impaired fasting blood sugar    Mild vitamin D deficiency    Recurrent major depressive disorder ([facility])    S/P rotator cuff repair    Seasonal allergies    Ventral hernia without obstruction or gangrene    Rising PSA level    Abnormal urinary stream    Urinary urgency    " Acetabular fracture (Multi)    Acetabulum fracture, right (Multi)    Bilateral sensorineural hearing loss    Chronic throat clearing    Persistent cough    Complete tear of right rotator cuff    Depression    Difficulty in walking, not elsewhere classified    Dysphagia    Elevated serum creatinine    Erectile dysfunction    GERD (gastroesophageal reflux disease)    Globus sensation    Hemorrhage of ear canal    Hemorrhoids    Hypertrophy of inferior nasal turbinate    Idiopathic peripheral neuropathy    Imbalance    Laryngopharyngeal reflux (LPR)    Muscle weakness (generalized)    Neck mass    Nephrolithiasis    Renal cyst    Otalgia, right    Prostate cancer (Multi)    Recurrent shoulder dislocation, right    Right trigeminal neuralgia    Trigeminus neuralgia    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)    Atherosclerotic heart disease of native coronary artery without angina pectoris    Mixed hyperlipidemia    Essential (primary) hypertension    Major depressive disorder, single episode, unspecified    BMI 29.0-29.9,adult    Bilateral vocal cord paresis    Glottic insufficiency    Muscle tension dysphonia       Surgical History  He has a past surgical history that includes Other surgical history (06/11/2013); Rotator cuff repair (06/11/2013); Other surgical history (09/23/2021); Other surgical history (09/23/2021); Gastric fundoplication (02/25/2015); US guided percutaneous peritoneal or retroperitoneal fluid collection drainage (2/10/2017); and US guided abscess drain (2/10/2017).     Social History  He reports that he has never smoked. He has never been exposed to tobacco smoke. He has never used smokeless tobacco. He reports that he does not currently use alcohol. He reports that he does not use drugs.    Family History  Family History   Problem Relation Name Age of Onset    Other (heart trouble) Mother      Kidney disease Father      Heart disease Father      Other (hypoglycemia) Father       "Other (heart trouble) Father          Allergies  Patient has no known allergies.    Review of Systems  A 12-point review of systems was performed and noted be negative except for that which was mentioned in the history of present illness     Last Recorded Vitals  /76 (BP Location: Right arm, Patient Position: Sitting, BP Cuff Size: Large adult)   Pulse 53   Temp 36.3 °C (97.3 °F) (Temporal)   Ht 1.88 m (6' 2\")   Wt 104 kg (230 lb 3.2 oz)   SpO2 94%   BMI 29.56 kg/m²       Physical Exam:  Constitutional:  No acute distress  Respiration:  Breathing comfortably, no stridor  Cardiovascular:  No clubbing/cyanosis/edema in hands  Eyes:  EOM intact, sclera normal  Neuro:  Alert and oriented times 3, Cranial nerves II-XII grossly intact and symmetric bilaterally. No asterixis  Head and Face:  Symmetric facial features, no masses or lesions, sinuses non-tender to palpation  Neck/Lymph:  No LAD, no thyroid masses, trachea midline, No JVD  Skin:  no visible rash or scratching marks   Psych:  Alert and oriented with appropriate mood and affect      Medications:  Medication Documentation Review Audit       Reviewed by URIEL Chan (Nurse Practitioner) on 08/13/24 at 0811      Medication Order Taking? Sig Documenting Provider Last Dose Status   amLODIPine (Norvasc) 10 mg tablet [id number] Yes Take 1 tablet (10 mg) by mouth once daily. Mayda Carter MD Taking Active   ascorbic acid, vitamin C, 1,000 mg ER tablet [id number] Yes Take by mouth. Historical Provider, MD Taking Active   aspirin 81 mg EC tablet [id number] Yes Take 1 tablet (81 mg) by mouth once daily. Historical Provider, MD Taking Active   atenolol (Tenormin) 25 mg tablet [id number] Yes Take 1 tablet (25 mg) by mouth once daily. Mayda Catrer MD Taking Active   atorvastatin (Lipitor) 40 mg tablet [id number] Yes Take 1 tablet (40 mg) by mouth once daily. Mayda Carter MD Taking Active   calcium carbonate-vitamin D3 (Caltrate with Vitamin D3) 600 " "mg-20 mcg (800 unit) tablet [id number] Yes Take by mouth twice a day. Albino Singh MD Taking Active   carBAMazepine (TEGretol) 100 mg chewable tablet [id number] Yes Chew 1 tablet (100 mg) early in the morning.. Mayda Carter MD Taking Active   cholecalciferol (Vitamin D3) 5,000 Units tablet [id number] Yes Take 1 tablet (5,000 Units) by mouth 1 (one) time per week. Albino Singh MD Taking Active   escitalopram (Lexapro) 20 mg tablet [id number] Yes Take 1 tablet (20 mg) by mouth once daily. Mayda Carter MD Taking Active   esomeprazole (NexIUM) 20 mg DR capsule [id number] Yes Take 1 capsule (20 mg) by mouth once daily. Do not open capsule. Albino Singh MD Taking Active   losartan-hydrochlorothiazide (Hyzaar) 100-25 mg tablet [id number] Yes Take 1 tablet by mouth once daily. Mayda Carter MD Taking Active   montelukast (Singulair) 10 mg tablet [id number] Yes Take 1 tablet (10 mg) by mouth once daily. Mayda Carter MD Taking Active   multivitamin with minerals iron-free (Centrum Silver) [id number] Yes Take 1 tablet by mouth. Albino Singh MD Taking Active   mv-min-folic acid-lutein (Centrum Silver) 400-250 mcg tablet,chewable [id number]  Chew 1 tablet once daily. Albino Provider, MD  Active   Myrbetriq 50 mg tablet extended release 24 hr 24 hr tablet [id number] Yes Take by mouth. Albino Singh MD Taking Active   traZODone (Desyrel) 100 mg tablet [id number] Yes Take 1 tablet (100 mg) by mouth once daily at bedtime. Mayda Carter MD Taking Active                    Relevant Results Recent labs reviewed in the EMR.  Lab Results   Component Value Date    HGB 14.2 03/06/2024    HGB 14.2 09/08/2023    HGB 14.6 08/11/2023    HGB 14.1 02/24/2023    MCV 96 03/06/2024    MCV 95.1 09/08/2023    MCV 95 08/11/2023    MCV 92 02/24/2023     03/06/2024     09/08/2023     08/11/2023     02/24/2023       No results found for: \"FERRITIN\", \"IRON\"    Lab Results   Component Value " Date     03/06/2024    K 4.1 03/06/2024     03/06/2024    BUN 37 (H) 03/06/2024    CREATININE 1.34 (H) 03/06/2024       Imaging:  I personally reviewed then and this is my impression:        Assessment/Plan   1. Stage 3a chronic kidney disease (Multi)  - stable non proteinuric CKD for several years, on RASi, will get new studies including urine, will consider adding SGLT2i   - Albumin-Creatinine Ratio, Urine Random; Future  - Creatinine, Urine Random; Future  - Protein, Urine Random; Future  - Parathyroid Hormone, Intact; Future  - Vitamin D 25-Hydroxy,Total (for eval of Vitamin D levels); Future  - Uric Acid; Future  - Renal Function Panel; Future  - Urinalysis with Reflex Microscopic; Future  - CBC and Auto Differential; Future  - Follow Up In Nephrology; Future    2. Essential hypertension  - controlled, no changes   - Follow Up In Nephrology; Future     - RTO 3 months   Cayetano Yang MD  Nephrology and Hypertension

## 2024-08-21 ENCOUNTER — APPOINTMENT (OUTPATIENT)
Dept: LAB | Facility: LAB | Age: 74
End: 2024-08-21
Payer: MEDICARE

## 2024-08-21 ENCOUNTER — APPOINTMENT (OUTPATIENT)
Dept: NEPHROLOGY | Facility: CLINIC | Age: 74
End: 2024-08-21
Payer: MEDICARE

## 2024-08-21 VITALS
WEIGHT: 230.2 LBS | HEIGHT: 74 IN | TEMPERATURE: 97.3 F | OXYGEN SATURATION: 94 % | HEART RATE: 53 BPM | BODY MASS INDEX: 29.54 KG/M2 | SYSTOLIC BLOOD PRESSURE: 116 MMHG | DIASTOLIC BLOOD PRESSURE: 76 MMHG

## 2024-08-21 DIAGNOSIS — I10 ESSENTIAL HYPERTENSION: ICD-10-CM

## 2024-08-21 DIAGNOSIS — N18.31 STAGE 3A CHRONIC KIDNEY DISEASE (MULTI): Primary | ICD-10-CM

## 2024-08-21 PROCEDURE — 1159F MED LIST DOCD IN RCRD: CPT | Performed by: INTERNAL MEDICINE

## 2024-08-21 PROCEDURE — 3044F HG A1C LEVEL LT 7.0%: CPT | Performed by: INTERNAL MEDICINE

## 2024-08-21 PROCEDURE — 3048F LDL-C <100 MG/DL: CPT | Performed by: INTERNAL MEDICINE

## 2024-08-21 PROCEDURE — 3074F SYST BP LT 130 MM HG: CPT | Performed by: INTERNAL MEDICINE

## 2024-08-21 PROCEDURE — 99214 OFFICE O/P EST MOD 30 MIN: CPT | Performed by: INTERNAL MEDICINE

## 2024-08-21 PROCEDURE — 1036F TOBACCO NON-USER: CPT | Performed by: INTERNAL MEDICINE

## 2024-08-21 PROCEDURE — 3078F DIAST BP <80 MM HG: CPT | Performed by: INTERNAL MEDICINE

## 2024-08-27 ENCOUNTER — APPOINTMENT (OUTPATIENT)
Dept: ORTHOPEDIC SURGERY | Facility: CLINIC | Age: 74
End: 2024-08-27
Payer: MEDICARE

## 2024-08-29 ENCOUNTER — APPOINTMENT (OUTPATIENT)
Dept: PRIMARY CARE | Facility: CLINIC | Age: 74
End: 2024-08-29
Payer: MEDICARE

## 2024-09-05 ENCOUNTER — LAB (OUTPATIENT)
Dept: LAB | Facility: LAB | Age: 74
End: 2024-09-05
Payer: MEDICARE

## 2024-09-05 ENCOUNTER — TREATMENT (OUTPATIENT)
Dept: SPEECH THERAPY | Facility: CLINIC | Age: 74
End: 2024-09-05
Payer: MEDICARE

## 2024-09-05 ENCOUNTER — PRE-ADMISSION TESTING (OUTPATIENT)
Dept: PREADMISSION TESTING | Facility: HOSPITAL | Age: 74
End: 2024-09-05
Payer: MEDICARE

## 2024-09-05 VITALS
HEART RATE: 53 BPM | HEIGHT: 74 IN | BODY MASS INDEX: 28.94 KG/M2 | DIASTOLIC BLOOD PRESSURE: 73 MMHG | WEIGHT: 225.53 LBS | RESPIRATION RATE: 16 BRPM | OXYGEN SATURATION: 95 % | TEMPERATURE: 97 F | SYSTOLIC BLOOD PRESSURE: 119 MMHG

## 2024-09-05 DIAGNOSIS — R49.0 MUSCLE TENSION DYSPHONIA: ICD-10-CM

## 2024-09-05 DIAGNOSIS — C61 PROSTATE CANCER (MULTI): ICD-10-CM

## 2024-09-05 DIAGNOSIS — R13.12 OROPHARYNGEAL DYSPHAGIA: ICD-10-CM

## 2024-09-05 DIAGNOSIS — J38.3 GLOTTIC INSUFFICIENCY: ICD-10-CM

## 2024-09-05 DIAGNOSIS — J38.02 BILATERAL VOCAL CORD PARESIS: Primary | ICD-10-CM

## 2024-09-05 DIAGNOSIS — Z01.818 PREOPERATIVE TESTING: Primary | ICD-10-CM

## 2024-09-05 DIAGNOSIS — N18.31 STAGE 3A CHRONIC KIDNEY DISEASE (MULTI): ICD-10-CM

## 2024-09-05 LAB
25(OH)D3 SERPL-MCNC: 60 NG/ML (ref 31–100)
ALBUMIN SERPL BCP-MCNC: 4.6 G/DL (ref 3.4–5)
ANION GAP SERPL CALC-SCNC: 13 MMOL/L (ref 10–20)
APPEARANCE UR: CLEAR
BASOPHILS # BLD AUTO: 0.02 X10*3/UL (ref 0–0.1)
BASOPHILS NFR BLD AUTO: 0.4 %
BILIRUB UR STRIP.AUTO-MCNC: NEGATIVE MG/DL
BUN SERPL-MCNC: 28 MG/DL (ref 6–23)
CALCIUM SERPL-MCNC: 10.1 MG/DL (ref 8.6–10.3)
CHLORIDE SERPL-SCNC: 101 MMOL/L (ref 98–107)
CO2 SERPL-SCNC: 31 MMOL/L (ref 21–32)
COLOR UR: YELLOW
CREAT SERPL-MCNC: 1.5 MG/DL (ref 0.5–1.3)
CREAT UR-MCNC: 136.5 MG/DL
CREAT UR-MCNC: 136.5 MG/DL
EGFRCR SERPLBLD CKD-EPI 2021: 48 ML/MIN/1.73M*2
EOSINOPHIL # BLD AUTO: 0.07 X10*3/UL (ref 0–0.4)
EOSINOPHIL NFR BLD AUTO: 1.3 %
ERYTHROCYTE [DISTWIDTH] IN BLOOD BY AUTOMATED COUNT: 13.5 % (ref 11.5–14.5)
GLUCOSE SERPL-MCNC: 86 MG/DL (ref 74–99)
GLUCOSE UR STRIP.AUTO-MCNC: NEGATIVE MG/DL
HCT VFR BLD AUTO: 42.8 % (ref 41–52)
HGB BLD-MCNC: 13.9 G/DL (ref 13.5–17.5)
IMM GRANULOCYTES # BLD AUTO: 0.01 X10*3/UL (ref 0–0.5)
IMM GRANULOCYTES NFR BLD AUTO: 0.2 % (ref 0–0.9)
KETONES UR STRIP.AUTO-MCNC: NEGATIVE MG/DL
LEUKOCYTE ESTERASE UR QL STRIP.AUTO: NEGATIVE
LYMPHOCYTES # BLD AUTO: 1.36 X10*3/UL (ref 0.8–3)
LYMPHOCYTES NFR BLD AUTO: 24.4 %
MCH RBC QN AUTO: 30.7 PG (ref 26–34)
MCHC RBC AUTO-ENTMCNC: 32.5 G/DL (ref 32–36)
MCV RBC AUTO: 95 FL (ref 80–100)
MICROALBUMIN UR-MCNC: <12 MG/L (ref 0–23)
MICROALBUMIN/CREAT UR: NORMAL MG/G{CREAT}
MONOCYTES # BLD AUTO: 0.52 X10*3/UL (ref 0.05–0.8)
MONOCYTES NFR BLD AUTO: 9.3 %
NEUTROPHILS # BLD AUTO: 3.59 X10*3/UL (ref 1.6–5.5)
NEUTROPHILS NFR BLD AUTO: 64.4 %
NITRITE UR QL STRIP.AUTO: NEGATIVE
NRBC BLD-RTO: NORMAL /100{WBCS}
PH UR STRIP.AUTO: 6 [PH]
PHOSPHATE SERPL-MCNC: 3.8 MG/DL (ref 2.5–4.9)
PLATELET # BLD AUTO: 195 X10*3/UL (ref 150–450)
POTASSIUM SERPL-SCNC: 3.8 MMOL/L (ref 3.5–5.3)
PROT UR STRIP.AUTO-MCNC: NEGATIVE MG/DL
PROT UR-MCNC: 14 MG/DL
PROT/CREAT UR: 0.1 MG/MG CREAT
PTH-INTACT SERPL-MCNC: 77.4 PG/ML (ref 18.5–88)
RBC # BLD AUTO: 4.53 X10*6/UL (ref 4.5–5.9)
RBC # UR STRIP.AUTO: NEGATIVE /UL
SODIUM SERPL-SCNC: 141 MMOL/L (ref 136–145)
SP GR UR STRIP.AUTO: 1.02
URATE SERPL-MCNC: 7.7 MG/DL (ref 4–7.5)
UROBILINOGEN UR STRIP.AUTO-MCNC: 0.2 MG/DL
WBC # BLD AUTO: 5.6 X10*3/UL (ref 4.4–11.3)

## 2024-09-05 PROCEDURE — 82570 ASSAY OF URINE CREATININE: CPT

## 2024-09-05 PROCEDURE — 85025 COMPLETE CBC W/AUTO DIFF WBC: CPT

## 2024-09-05 PROCEDURE — 99203 OFFICE O/P NEW LOW 30 MIN: CPT | Performed by: NURSE PRACTITIONER

## 2024-09-05 PROCEDURE — 82306 VITAMIN D 25 HYDROXY: CPT

## 2024-09-05 PROCEDURE — 93005 ELECTROCARDIOGRAM TRACING: CPT

## 2024-09-05 PROCEDURE — 92526 ORAL FUNCTION THERAPY: CPT | Mod: GN

## 2024-09-05 PROCEDURE — 80069 RENAL FUNCTION PANEL: CPT

## 2024-09-05 PROCEDURE — 82043 UR ALBUMIN QUANTITATIVE: CPT

## 2024-09-05 PROCEDURE — 84550 ASSAY OF BLOOD/URIC ACID: CPT

## 2024-09-05 PROCEDURE — 93010 ELECTROCARDIOGRAM REPORT: CPT | Performed by: INTERNAL MEDICINE

## 2024-09-05 PROCEDURE — 83970 ASSAY OF PARATHORMONE: CPT

## 2024-09-05 PROCEDURE — 36415 COLL VENOUS BLD VENIPUNCTURE: CPT

## 2024-09-05 PROCEDURE — 84156 ASSAY OF PROTEIN URINE: CPT

## 2024-09-05 PROCEDURE — 81003 URINALYSIS AUTO W/O SCOPE: CPT

## 2024-09-05 PROCEDURE — 92507 TX SP LANG VOICE COMM INDIV: CPT | Mod: GN

## 2024-09-05 ASSESSMENT — PAIN - FUNCTIONAL ASSESSMENT
PAIN_FUNCTIONAL_ASSESSMENT: 0-10
PAIN_FUNCTIONAL_ASSESSMENT: 0-10

## 2024-09-05 ASSESSMENT — PAIN SCALES - GENERAL
PAINLEVEL_OUTOF10: 0 - NO PAIN
PAINLEVEL_OUTOF10: 0 - NO PAIN

## 2024-09-05 NOTE — PROGRESS NOTES
"Speech-Language Pathology    SLP Adult Outpatient Speech-Language Pathology Treatment     Patient Name: Adebayo Puentes \"Ed\"  MRN: [id number]  Today's Date: 9/5/2024     Time Calculation  Start Time: 0945  Stop Time: 1045  Time Calculation (min): 60 min      Current Problem:   1. Bilateral vocal cord paresis        2. Glottic insufficiency        3. Muscle tension dysphonia        4. Oropharyngeal dysphagia          Pain Assessment:  Pain Assessment: 0-10  0-10 (Numeric) Pain Score: 0 - No pain    SUBJECTIVE  Patient alert and oriented and ready to participate in office/telehealth visit this date.    OBJECTIVE  LONG TERM GOAL  Improve overall vocal health to foster increased participation levels at home, work and in the community environment.  Patient will demonstrate reduction in symptoms as evidenced by improved scores on VHI-10, RSI, and/or CSI following treatment.  Improve ability to tolerate the least restrictive diet without signs/symptoms of aspiration. (NEW 9/5/24)    SHORT TERM GOALS  Patient will increase vocal wellness and decrease phono trauma in adherence with clinician prescribed vocal hygiene and wellness program per patient report 80% of his/her day.  Patient will increase ability to produce voice without tension within 5 minute conversational task x 80% accuracy as judged by clinician observation and/or patient report.  Patient will demonstrate independent use of voice/swallow (NEW 9/4/24) techniques x 80% accuracy.  Patient will increase the balance/strength of the respiratory/laryngeal/swallowing (NEW 9/5/24) musculature x 80% accuracy.    ASSESSMENT  SWALLOW  MBSS completed on 8/20/24 with the following recommendations:  \"FINAL SPEECH RECOMMENDATIONS  DIET RECOMMENDED:   - Regular (IDDSI Level 7)- AVOID MIXED CONSISTENCIES  - Thin liquids (IDDSI Level 0)  *MEDS WHOLE IN PUREE CARRIER  Patient to continue modified diet with implementation of the following swallow strategies listed " "below:  STRATEGIES:  - Small bites  - Small, single sips  - Upright for all PO intake  - Medications whole in puree or as best tolerated  - Slow rate  Plan:  Treatment/Interventions: Patient/family education, Bolus trials, Compensatory strategy training, Determination of diet tolerance.  SLP Plan: Skilled SLP warranted  SLP Frequency: To be determined by treating SLP in outpatient setting\"    Clinician reviewed these recommendations with the patient this date. Additionally, BOT and pharyngeal strengthening exercises were initiated via Sofia maneuver, gargling and an effortful swallow. Clinician modeled each exercise and accurate patient follow through confirmed. Encouraged completion of each 10 times, 3 times a day.     VOICE  Patient reports good follow through of HEP including RVT bubble blowing, tongue trilling and humming with a lingual anchor. He thinks since starting the rebalancing exercises he's had more days of stable voice. He does admit to voice variability when more tired. Monitored performance with RVT bubble blowing, tongue trilling and humming with a lingual anchor. Cueing needed to improve posture and to reduce overall effort to avoid the MTD. Following feedback and repeated trials performance accuracy improved. Increased program complexity by adding chanting and saying [m] + \"uh\" and reciting initial [m] words and phrases using forward placed voicing. Cueing to project the voice in his natural pitch improved outcome. Encouraged continued home practice several times a day for limited duration.     Throat clearing present throughout session this date. Suspect raspy quality is also due to vocal cord edema in addition to the bilateral paresis. Therefore, instructed patient this date in cough/throat clear reduction techniques (effortful swallow, RTB, Sofia, silent cough/clear), seltzer or warm water gargle technique to safely remove mucus and vocal wellness strategies to reduce cough/throat clear " triggers and phono trauma  Clinician modeled all techniques and accurate patient follow through confirmed.      Handouts of all new information covered this date provided to facilitate optimal home carryover.    PLAN  Continue current plan of care  Progress with POC, as tolerated  Discussed POC with patient  Patient/caregiver agreeable with POC

## 2024-09-05 NOTE — CPM/PAT H&P
Patient is an alert and oriented 74 year old male scheduled for a  Prostate biopsy  on 9/10/2024 with Dr. Schmitt for  Prostate Cancer. The patient denies hematuria, dysuria, burning with urination, fever, chills, n/v/d.  No lower abdominal, back or flank pain.  Does have intermittent frequency and urgency.  Has had a prostate biopsy in the past.       PMHX includes CKD 3a, CAD s/p MI 2000 w/ thrombolytics, HTN, Hyperlipidemia, BPH w/ LUTS s/p TURP 2020, Depression, GERD, Trigeminal Neuralgia.      P[place] to [facility] [place] for perioperative risk stratification and optimization.      Review of Systems   Constitutional: Negative for chills, decreased appetite, diaphoresis, fever and malaise/fatigue.   Eyes:  Negative for blurred vision and double vision.   Cardiovascular:  Negative for chest pain, claudication, cyanosis, dyspnea on exertion, irregular heartbeat, leg swelling, near-syncope and palpitations.   Respiratory:  Negative for cough, hemoptysis, shortness of breath and wheezing.    Endocrine: Negative for cold intolerance, heat intolerance, polydipsia, polyphagia and polyuria.   Gastrointestinal:  Negative for abdominal pain, constipation, diarrhea, dysphagia, nausea and vomiting.   Genitourinary:  Negative for bladder incontinence, dysuria, hematuria, incomplete emptying, nocturia, pelvic pain and urgency.   Neurological:  Negative for headaches, light-headedness, paresthesias, sensory change and weakness.   Psychiatric/Behavioral:  Negative for altered mental status.       Vitals and nursing note reviewed.     Physical exam  Constitutional:       Appearance: Normal appearance. He is normal weight.   HENT:      Head: Normocephalic and atraumatic.      Mouth/Throat:      Mouth: Mucous membranes are moist.      Pharynx: Oropharynx is clear.   Eyes:      Extraocular Movements: Extraocular movements intact.      Conjunctiva/sclera: Conjunctivae normal.      Pupils: Pupils are equal, round, and reactive to light.    Cardiovascular:      Rate and Rhythm: Normal rate and regular rhythm.      Pulses: Normal pulses.      Heart sounds: Normal heart sounds.      No audible carotid bruit  Pulmonary:      Effort: Pulmonary effort is normal.      Breath sounds: Normal breath sounds.   Abdominal:      General: Abdomen is flat. Bowel sounds are normal.      Palpations: Abdomen is soft.   Musculoskeletal:      Cervical back: Normal range of motion and neck supple.   Skin:     General: Skin is warm and dry.      Capillary Refill: Capillary refill takes less than 2 seconds.   Neurological:      General: No focal deficit present.      Mental Status: He is alert and oriented to person, place, and time. Mental status is at baseline.   Psychiatric:         Mood and Affect: Mood normal.         Behavior: Behavior normal.         Thought Content: Thought content normal.         Judgment: Judgment normal.     Vitals and nursing note reviewed. Physical exam within normal limits.   Vitals:    09/05/24 1433   BP: 119/73   Pulse: 53   Resp: 16   Temp: 36.1 °C (97 °F)   SpO2: 95%     Assessment & Plan:    Neuro:  Trigeminal Neuralgia  Managed with carbamazepine 100mg daily  Follow up with neurology as scheduled    Depression  Managed with lexapro 20mg    HEENT/Airway:  No diagnosis or significant findings on chart review or clinical presentation and evaluation.   STOP-BANG Score 5    Mallampati::  III    TM distance::  >3 FB    Neck ROM::  Full  Mouth opening 3  Has dental crowns    Cardiovascular:  CAD s/p MI w/ thrombolytics   Managed with aspirin 81mg     Nuclear Stress Test 3/6/2024  There is a small sized area of scar involving the apical inferior wall  There is no evidence of any significant ischemia  Thre is severe hypokinesis involving the apical inferior segment  Ejection Fraction is calculated at 62%  Normal Stress ECG    TTE 3/6/2024   1. Left ventricular systolic function is normal.   2. Moderately increased left ventricular septal  thickness.   3. Spectral Doppler shows an impaired relaxation pattern of left ventricular diastolic filling.   4. Aortic valve sclerosis    Hypertension  Managed with losartan-hydrochlorothiazide 100-25mg daily  Managed with atenolol 25mg daily  Managed with amlodipine 10mg daily    Hyperlipidemia  Managed with atorvastatin 40mg daily    Cardiac clearance per Dr. Howell   Patient is a low surgical risk  Can hold aspirin 5-7 days prior to procedure  Patient notified    METS: 6.27  RCRI: 0 points, 3.9%  risk for postoperative MACE   FELTON: 0.3% risk for postoperative MACE  EKG - Sinus Carlos rate of 48    Pulmonary:  No diagnosis or significant findings on chart review or clinical presentation and evaluation.   ARISCAT: <26 points, 1.6% risk of in-hospital postoperative pulmonary complication  PRODIGY: High risk for opioid induced respiratory depression  Smoking History-does not smoke   deep breathing handout given    Renal:   Prostate Biopsy  Prostate Cancer - active surveillance  Last psa 3/6/57192.82  PSA ordered by Dr. Schmitt    BPH w/ LUTS  Managed with myrbetriq 50mg     CKD 3a  Last bun and creat 3/6/2024 -  37 & 1.34, GFR 56     The patient is at increased risk of perioperative renal complications secondary to age>/= 56, male sex, HTN, and renal insuff (preop creat >1.2 mg/dl). Preventative measures include  BUN & Creat ordered by Dr. Schmitt    Endocrine:  No diagnosis or significant findings on chart review or clinical presentation and evaluation.     Hematologic:  CBC ordered   Caprini Score 4, patient at Moderate risk for postoperative DVT. Pt supplied education/VTE handout    Gastrointestinal:   GERD  Managed with nexium 20 mg  Alcohol use-rare  Drug use-none    Infectious disease:   No diagnosis or significant findings on chart review or clinical presentation and evaluation.     Musculoskeletal:   No diagnosis or significant findings on chart review or clinical presentation and evaluation.   JHFRAT score 7  moderate falls risk    Anesthesia:  No anesthesia complications  Family history of anesthesia complications none    Labs & Imaging ordered:  Bun and Creat by Dr. Schmitt  Nickel/metal allergy-none  Shellfish allergy-none    Overall, patient at low risk for the scheduled surgery. Discussed with patient medication instructions, NPO guidelines, and any questions or concerns. Patient needs no further workup prior to preceding with elective surgery.     Face to face time- 30 minutes  Cassie Laws CNP

## 2024-09-05 NOTE — PREPROCEDURE INSTRUCTIONS
Medication List            Accurate as of September 5, 2024  2:53 PM. Always use your most recent med list.                amLODIPine 10 mg tablet  Commonly known as: Norvasc  Take 1 tablet (10 mg) by mouth once daily.  Medication Adjustments for Surgery: Take on the morning of surgery     ascorbic acid (vitamin C) 1,000 mg ER tablet  Additional Medication Adjustments for Surgery: Take last dose 7 days before surgery  Notes to patient: Last dose today     aspirin 81 mg EC tablet  Notes to patient: Per prescriber if for prevention can stop  today     atenolol 25 mg tablet  Commonly known as: Tenormin  Take 1 tablet (25 mg) by mouth once daily.  Medication Adjustments for Surgery: Take on the morning of surgery     atorvastatin 40 mg tablet  Commonly known as: Lipitor  Take 1 tablet (40 mg) by mouth once daily.  Medication Adjustments for Surgery: Take/Use as prescribed     Caltrate with Vitamin D3 600 mg-20 mcg (800 unit) tablet  Generic drug: calcium carbonate-vitamin D3  Additional Medication Adjustments for Surgery: Take last dose 7 days before surgery  Notes to patient: Last dose today     carBAMazepine 100 mg chewable tablet  Commonly known as: TEGretol  Chew 1 tablet (100 mg) early in the morning..  Medication Adjustments for Surgery: Take/Use as prescribed     Centrum Silver 400-250 mcg tablet,chewable  Generic drug: mv-min-folic acid-lutein  Additional Medication Adjustments for Surgery: Take last dose 7 days before surgery  Notes to patient: Last dose today     escitalopram 20 mg tablet  Commonly known as: Lexapro  Take 1 tablet (20 mg) by mouth once daily.  Medication Adjustments for Surgery: Take on the morning of surgery     esomeprazole 20 mg DR capsule  Commonly known as: NexIUM  Medication Adjustments for Surgery: Take on the morning of surgery     losartan-hydrochlorothiazide 100-25 mg tablet  Commonly known as: Hyzaar  Take 1 tablet by mouth once daily.  Medication Adjustments for Surgery: Take  last dose 1 day (24 hours) before surgery  Notes to patient: Last dose am of 9/9/2024  If you take in the evening last dose on 9/8/2024     montelukast 10 mg tablet  Commonly known as: Singulair  Take 1 tablet (10 mg) by mouth once daily.  Medication Adjustments for Surgery: Take on the morning of surgery     multivitamin with minerals iron-free  Commonly known as: Centrum Silver  Additional Medication Adjustments for Surgery: Take last dose 7 days before surgery  Notes to patient: Last dose today     Myrbetriq 50 mg tablet extended release 24 hr 24 hr tablet  Generic drug: mirabegron  Medication Adjustments for Surgery: Do Not take on the morning of surgery     traZODone 100 mg tablet  Commonly known as: Desyrel  Take 1 tablet (100 mg) by mouth once daily at bedtime.  Medication Adjustments for Surgery: Take/Use as prescribed     Vitamin D3 5,000 Units tablet  Generic drug: cholecalciferol  Additional Medication Adjustments for Surgery: Take last dose 7 days before surgery  Notes to patient: Last dose today            NPO Instructions:    Do not eat any food after midnight the night before your surgery/procedure.  You may have clear liquids until TWO hours before surgery/procedure. This includes water, black tea/coffee, (no milk or cream) apple juice and electrolyte drinks (Gatorade).  You may chew gum up to TWO hours before your surgery/procedure.    Additional Instructions:     Seven/Six Days before Surgery:  Review your medication instructions, stop indicated medications  Five Days before Surgery:  Review your medication instructions, stop indicated medications  Three Days before Surgery:  Review your medication instructions, stop indicated medications  The Day before Surgery:  Review your medication instructions, stop indicated medications  You will be contacted regarding the time of your arrival to facility and surgery time  Do not eat any food after Midnight  Day of Surgery:  Review your medication  instructions, take indicated medications  You may have clear liquids until TWO hours before surgery/procedure.  This includes water, black tea/coffee, (no milk or cream) apple juice and electrolyte drinks (Gatorade)  You may chew gum up to TWO hours before your surgery/procedure  Wear  comfortable loose fitting clothing  Do not use moisturizers, creams, lotions or perfume  All jewelry and valuables should be left at home  CONTACT SURGEON'S OFFICE IF YOU DEVELOP:  * Fever = 100.4 F   * New respiratory symptoms (e.g. cough, shortness of breath, respiratory distress, sore throat)  * Recent loss of taste or smell  *Flu like symptoms such as headache, fatigue or gastrointestinal symptoms  * You develop any open sores, shingles, burning or painful urination   AND/OR:  * You no longer wish to have the surgery.  * Any other personal circumstances change that may lead to the need to cancel or defer this surgery.  *You were admitted to any hospital within one week of your planned procedure.    SMOKING:  *Quitting smoking can make a huge difference to your health and recovery from surgery.    *If you need help with quitting, call 7-123-QUIT-NOW.    THE DAY BEFORE SURGERY:  *Do not eat any food after midnight the night before your surgery.   *You may have up to 13.5 OUNCES of clear liquids until TWO hours before your instructed ARRIVAL TIME to hospital. This includes water, black tea/coffee, (no milk or cream) apple juice, clear broth and electrolyte drinks (Gatorade). Please avoid clear liquids that are red in color.   *You may chew gum/mints up to TWO hours before your surgery/procedure.    SURGICAL TIME:  *You will be contacted between 2 p.m. and 3 p.m. the business day before your surgery with your arrival time.  *If you haven't received a call by 3pm, call [phone]  *Scheduled surgery times may change and you will be notified if this occurs-check your personal voicemail for any updates.    ON THE MORNING OF  SURGERY:  *Wear comfortable, loose fitting clothing.   *Do not use moisturizers, creams, lotions or perfume.  *All jewelry and valuables should be left at home.  *Prosthetic devices such as contact lenses, hearing aids, dentures, eyelash extensions, hairpins and body piercing must be removed before surgery.    BRING WITH YOU:  *Photo ID and insurance card  *Current list of medications and allergies  *Pacemaker/Defibrillator/Heart stent cards  *CPAP machine and mask  *Slings/splints/crutches  *Copy of your complete Advanced Directive/DHPOA-if applicable  *Neurostimulator implant remote    PARKING AND ARRIVAL:  *Check in at the Main Entrance desk and let them know you are here for surgery.    IF YOU ARE HAVING OUTPATIENT/SAME DAY SURGERY:  *A responsible adult MUST accompany you at the time of discharge and stay with you for 24 hours after your surgery.  *You may NOT drive yourself home after surgery.  *You may use a taxi or ride sharing service ([employer], [employer]) to return home ONLY if you are accompanied by a friend or family member.  *Instructions for resuming your medications will be provided by your surgeon.    Thank you for coming to Pre Admission testing.     If I have prescribe medication please don't forget to  at your pharmacy.     Any questions about today's visit call [phone] and leave a message in the general mailbox.    Patient instructed to ambulate as soon as possible postoperatively to decrease thromboembolic risk.    Cassie Laws, APRN-CNP    Thank you for visiting [facility].  If you have any changes to your health condition, please call the surgeons office to alert them and give them details of your symptoms.        Preoperative Fasting Guidelines    Why must I stop eating and drinking near surgery time?  With sedation, food or liquid in your stomach can enter your lungs causing serious complications  Increases nausea and vomiting    When do I need to stop eating  and drinking before my surgery?  Do not eat any food after midnight the night before your surgery/procedure.  You may have up to 13.5 ounces of clear liquid until TWO hours before your instructed arrival time to the hospital.  This includes water, black tea/coffee, (no milk or cream) apple juice, and electrolyte drinks (Gatorade)  You may chew gum until TWO hours before your surgery/procedure      Additional Instructions:     The Day before Surgery:  -Review your medication instructions, stop indicated medications  -You will be contacted in the evening regarding the time of your arrival to facility and surgery time    Day of Surgery:  -Review your medication instructions, take indicated medications  -Wear comfortable loose fitting clothing  -Do not use moisturizers, creams, lotions or perfume  -All jewelry and valuables should be left at home              Preoperative Brain Exercises    What are brain exercises?  A brain exercise is any activity that engages your thinking (cognitive) skills.    What types of activities are considered brain exercises?  Jigsaw puzzles, crossword puzzles, word jumble, memory games, word search, and many more.  Many can be found free online or on your phone via a mobile chiqui.    Why should I do brain exercises before my surgery?  More recent research has shown brain exercise before surgery can lower the risk of postoperative delirium (confusion) which can be especially important for older adults.  Patients who did brain exercises for 5 to 10 hours the days before surgery, cut their risk of postoperative delirium in half up to 1 week after surgery.                  [facility]    Preoperative Deep Breathing Exercises    Why it is important to do deep breathing exercises before my surgery?  Deep breathing exercises strengthen your breathing muscles.  This helps you to recover after your surgery and decreases the chance of breathing complications.      How are the  deep breathing exercises done?  Sit straight with your back supported.  Breathe in deeply and slowly through your nose. Your lower rib cage should expand and your abdomen may move forward.  Hold that breath for 3 to 5 seconds.  Breathe out through pursed lips, slowly and completely.  Rest and repeat 10 times every hour while awake.  Rest longer if you become dizzy or lightheaded.                [facility]    Preoperative Deep Breathing Exercises    Why it is important to do deep breathing exercises before my surgery?  Deep breathing exercises strengthen your breathing muscles.  This helps you to recover after your surgery and decreases the chance of breathing complications.      How are the deep breathing exercises done?  Sit straight with your back supported.  Breathe in deeply and slowly through your nose. Your lower rib cage should expand and your abdomen may move forward.  Hold that breath for 3 to 5 seconds.  Breathe out through pursed lips, slowly and completely.  Rest and repeat 10 times every hour while awake.  Rest longer if you become dizzy or lightheaded.      Patient Information: Incentive Spirometer  What is an incentive spirometer?  An incentive spirometer is a device used before and after surgery to “exercise” your lungs.  It helps you to take deeper breaths to expand your lungs.  Below is an example of a basic incentive spirometer.  The device you receive may differ slightly but they all function the same.    Why do I need to use an incentive spirometer?  Using your incentive spirometer prepares your lungs for surgery and helps prevent lung problems after surgery.  How do I use my incentive spirometer?  When you're using your incentive spirometer, make sure to breathe through your mouth. If you breathe through your nose, the incentive spirometer won't work properly. You can hold your nose if you have trouble.  If you feel dizzy at any time, stop and rest. Try again at a  later time.  Follow the steps below:  Set up your incentive spirometer, expand the flexible tubing and connect to the outlet.  Sit upright in a chair or bed. Hold the incentive spirometer at eye level.   Put the mouthpiece in your mouth and close your lips tightly around it. Slowly breathe out (exhale) completely.  Breathe in (inhale) slowly through your mouth as deeply as you can. As you take a breath, you will see the piston rise inside the large column. While the piston rises, the indicator should move upwards. It should stay in between the 2 arrows (see Figure).  Try to get the piston as high as you can, while keeping the indicator between the arrows.   If the indicator doesn't stay between the arrows, you're breathing either too fast or too slow.  When you get it as high as you can, hold your breath for 10 seconds, or as long as possible. While you're holding your breath, the piston will slowly fall to the base of the spirometer.  Once the piston reaches the bottom of the spirometer, breathe out slowly through your mouth. Rest for a few seconds.  Repeat 10 times. Try to get the piston to the same level with each breath.  Repeat every hour while awake  You can carefully clean the outside of the mouthpiece with an alcohol wipe or soap and water.      Patient and Family Education             Ways You Can Help Prevent Blood Clots             This handout explains some simple things you can do to help prevent blood clots.      Blood clots are blockages that can form in the body's veins. When a blood clot forms in your deep veins, it may be called a deep vein thrombosis, or DVT for short. Blood clots can happen in any part of the body where blood flows, but they are most common in the arms and legs. If a piece of a blood clot breaks free and travels to the lungs, it is called a pulmonary embolus (PE). A PE can be a very serious problem.         Being in the hospital or having surgery can raise your chances of getting  a blood clot because you may not be well enough to move around as much as you normally do.         Ways you can help prevent blood clots in the hospital         Wearing SCDs. SCDs stands for Sequential Compression Devices.   SCDs are special sleeves that wrap around your legs  They attach to a pump that fills them with air to gently squeeze your legs every few minutes.   This helps return the blood in your legs to your heart.   SCDs should only be taken off when walking or bathing.   SCDs may not be comfortable, but they can help save your life.               Wearing compression stockings - if your doctor orders them. These special snug fitting stockings gently squeeze your legs to help blood flow.       Walking. Walking helps move the blood in your legs.   If your doctor says it is ok, try walking the halls at least   5 times a day. Ask us to help you get up, so you don't fall.      Taking any blood thinning medicines your doctor orders.        Page 1 of 2     [facility]; 3/23   Ways you can help prevent blood clots at home       Wearing compression stockings - if your doctor orders them. ? Walking - to help move the blood in your legs.       Taking any blood thinning medicines your doctor orders.      Signs of a blood clot or PE      Tell your doctor or nurse know right away if you have of the problems listed below.    If you are at home, seek medical care right away. Call 911 for chest pain or problems breathing.               Signs of a blood clot (DVT) - such as pain,  swelling, redness or warmth in your arm or leg      Signs of a pulmonary embolism (PE) - such as chest     pain or feeling short of breath

## 2024-09-06 LAB
ATRIAL RATE: 48 BPM
P AXIS: 36 DEGREES
P OFFSET: 187 MS
P ONSET: 121 MS
PR INTERVAL: 194 MS
Q ONSET: 218 MS
QRS COUNT: 8 BEATS
QRS DURATION: 100 MS
QT INTERVAL: 438 MS
QTC CALCULATION(BAZETT): 391 MS
QTC FREDERICIA: 406 MS
R AXIS: -20 DEGREES
T AXIS: 23 DEGREES
T OFFSET: 437 MS
VENTRICULAR RATE: 48 BPM

## 2024-09-09 ENCOUNTER — TELEPHONE (OUTPATIENT)
Dept: CARDIOLOGY | Facility: CLINIC | Age: 74
End: 2024-09-09
Payer: MEDICARE

## 2024-09-18 ENCOUNTER — OFFICE VISIT (OUTPATIENT)
Dept: OTOLARYNGOLOGY | Facility: HOSPITAL | Age: 74
End: 2024-09-18
Payer: MEDICARE

## 2024-09-18 VITALS — WEIGHT: 229.1 LBS | BODY MASS INDEX: 29.41 KG/M2

## 2024-09-18 DIAGNOSIS — R49.0 DYSPHONIA: ICD-10-CM

## 2024-09-18 DIAGNOSIS — J38.7 PRESBYLARYNGES: ICD-10-CM

## 2024-09-18 DIAGNOSIS — R13.10 DYSPHAGIA, UNSPECIFIED TYPE: Primary | ICD-10-CM

## 2024-09-18 PROCEDURE — 99215 OFFICE O/P EST HI 40 MIN: CPT | Performed by: OTOLARYNGOLOGY

## 2024-09-18 PROCEDURE — 3062F POS MACROALBUMINURIA REV: CPT | Performed by: OTOLARYNGOLOGY

## 2024-09-18 PROCEDURE — 1160F RVW MEDS BY RX/DR IN RCRD: CPT | Performed by: OTOLARYNGOLOGY

## 2024-09-18 PROCEDURE — 3044F HG A1C LEVEL LT 7.0%: CPT | Performed by: OTOLARYNGOLOGY

## 2024-09-18 PROCEDURE — 1036F TOBACCO NON-USER: CPT | Performed by: OTOLARYNGOLOGY

## 2024-09-18 PROCEDURE — 31579 LARYNGOSCOPY TELESCOPIC: CPT | Performed by: OTOLARYNGOLOGY

## 2024-09-18 PROCEDURE — 1159F MED LIST DOCD IN RCRD: CPT | Performed by: OTOLARYNGOLOGY

## 2024-09-18 PROCEDURE — 3048F LDL-C <100 MG/DL: CPT | Performed by: OTOLARYNGOLOGY

## 2024-09-18 NOTE — LETTER
"2024     David Johnson, APRN-CNP  [address]  [facility] [address]    Patient: Sachin Puentes   YOB: 1950   Date of Visit: 2024       Dear Dr. David Johnson, APRN-CNP:    Thank you for referring Sachin Puentes to me for evaluation. Below are my notes for this consultation.  If you have questions, please do not hesitate to call me. I look forward to following your patient along with you.       Sincerely,     Virginia Delgado MD      CC: No Recipients  ______________________________________________________________________________________    Patient: Adebayo Puentes   MRN: [id number] YOB: 1950   Sex: male Age: 74 y.o.  Date of Service: 2024       ASSESSMENT AND PLAN  I discussed the findings with Adebayo Puentes \"Ed\" and have recommended the followin. Dysphagia to solids > liquids, history of GERD s/p Nissen fundoplication in .  - Esophagram to further characterize the esophagus given his prior Nissen  - Schedule for esophagoscopy and dilation (possible full esophagus) in the endosuite after esophagram. Risks, benefits, and alternatives of esophagoscopy and dilation discussed with the patient, including but not limited to bleeding, infection, pain, need for repeat procedure, no improvement in symptoms, and rarely, esophageal perforation. The patient expressed understanding and agrees to proceed.   - SLP referral for swallow therapy    2. 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 male referred by David Johnson APRN-C* for evaluation of dysphagia and dysphonia.  He has a history of GERD s/p Nissen fundoplication in . The patient reports his voice gets weak and will occasional go out. Has been going on for several years. He also notes issues with " food sticking in his throat, also over the past several years.    He saw [facility] for therapy and notes some improvement in his voice. It is more steady.    The dysphagia history includes:      Dysphagia for solids               yes    Dysphagia for liquids              occasional    Dysphagia for pills                  no  Associated weight loss            no    Recent pneumonia/bronchitis  no  GERD/Acid reflux   Prior Nissen on Nexxium as needed, very rare    ADDITIONAL HISTORY  Past Medical History  He has a past medical history of Abnormal findings on diagnostic imaging of other parts of musculoskeletal system (09/21/2016), BPH (benign prostatic hyperplasia), CKD (chronic kidney disease), Cutaneous abscess of neck (09/23/2021), Cyst of kidney, acquired (12/01/2015), Depression, GERD (gastroesophageal reflux disease), Old myocardial infarction, Old myocardial infarction (06/06/2013), Personal history of other diseases of the circulatory system, Personal history of other diseases of the circulatory system, Personal history of other diseases of the digestive system, Personal history of other diseases of the digestive system, Personal history of other diseases of urinary system, Personal history of other mental and behavioral disorders, Personal history of other specified conditions (09/02/2016), Personal history of other specified conditions, Personal history of other specified conditions, Prostate cancer (Multi), and Trigeminal neuralgia. Surgical History  He has a past surgical history that includes Other surgical history (06/11/2013); Rotator cuff repair (06/11/2013); Other surgical history (09/23/2021); Other surgical history (09/23/2021); Gastric fundoplication (02/25/2015); US guided percutaneous peritoneal or retroperitoneal fluid collection drainage (02/10/2017); US guided abscess drain (02/10/2017); and Prostate surgery.   Social History  He reports that he has never smoked. He has never been exposed to  tobacco smoke. He has never used smokeless tobacco. He reports that he does not currently use alcohol. He reports that he does not use drugs. Allergies  Patient has no known allergies.     Family History  Family History   Problem Relation Name Age of Onset   • Other (heart trouble) Mother     • Kidney disease Father     • Heart disease Father     • Other (hypoglycemia) Father     • Other (heart trouble) Father          REVIEW OF SYSTEMS  All 10 systems were reviewed and negative except for above.      PHYSICAL EXAM  ENT Physical Exam   GENERAL: Well-nourished and developed, alert and appropriate, no distress, voice [id number]  RESPIRATORY: Breathing quietly, no stridor  HEAD: Normocephalic atraumatic  FACE: Symmetric, no masses or lesions  EYES:  Pupils reactive, sclera clear, external ocular muscles intact, no nystagmus.    EARS:  Pinnae normal. External auditory canals clear and tympanic membranes intact.  NOSE:  No anterior lesions, masses or polyps.  ORAL CAVITY/OROPHARYNX:  Buccal mucosa is moist without lesions or masses, tongue midline and palate elevates symmetrically. Tongue mobility intact.  NECK:  Soft. There is no lymphadenopathy or thyromegaly.  Redundant submental skin but palpable landmarks  NEUROLOGIC:  Cranial nerves II-XII grossly intact.       Last Recorded Vitals  Weight 104 kg (229 lb 1.6 oz).    RESULTS    Patient Reported Outcome Measures  N/A    Laboratory, Radiology, and Pathology  I personally reviewed the following results, with the following interpretation:   [facility] [place] bar/web, limited eso follow-through        PROCEDURES  Flexible Stroboscopy In Clinic    Date/Time: 9/18/2024 11:04 AM    Performed by: Virginia Delgado MD  Authorized by: Virginia Delgado MD       Flexible Fiberoptic Laryngoscopy with Stroboscopy    Patient failed a mirror exam due to limitations of equipment and the need for stroboscopy to assess glottic vibration and closure.     PREOPERATIVE DIAGNOSIS:  Dysphonia    POSTOPERATIVE DIAGNOSIS: Same    PROCEDURE:  Strobovideolaryngoscopy    ANESTHESIA:  Topical    COMPLICATIONS:  None    SPECIMENS:  None    PROCEDURE IN DETAIL: The patient was seated in an upright position.  The nasal cavity was topically decongested and anesthetized.  The stroboscopic microphone was held to the neck at the level of the larynx.  The distal chip video laryngoscope was passed through the nasal cavity.  The nasal cavity and nasopharynx were within normal limits except noted below.  The following findings on stroboscopy were noted:      Tongue Base: no masses or lesions   Vocal Fold Mobility               Right VF: mobile               Left VF: mobile   TVF Appearance               Edema/Erythema: none               Lesions/vibratory margin irregularities: atrophy   Glottic Closure Pattern: complete    Vibration:               Phase: symmetric    Periodicity: regular                Amplitude: increased                Waveform: normal     Muscle Tension Patterns:  lateral   Other Findings: pooled secretions throughout pharynx    The patient tolerated the procedure well.       ----------------------------------------------------------------------  Virginia Delgado MD, MAEd    [facility]  Department of Otolaryngology - Head and Neck Surgery  [facility]    The total time I spent in care of this patient today (excluding time spent on other billable services) is as follows:    Time Spent  Prep time on day of patient encounter: 10 minutes  Time spent directly with patient, family or caregiver: 20 minutes  Additional Time Spent on Patient Care Activities: 5 minutes  Documentation Time: 10 minutes  Other Time Spent: 0 minutes  Total: 45 minutes

## 2024-09-18 NOTE — PROGRESS NOTES
"Patient: Adebayo Puentes   MRN: [id number] YOB: 1950   Sex: male Age: 74 y.o.  Date of Service: 2024       ASSESSMENT AND PLAN  I discussed the findings with Adebayo Puentes \"Ed\" and have recommended the followin. Dysphagia to solids > liquids, history of GERD s/p Nissen fundoplication in .  - Esophagram to further characterize the esophagus given his prior Nissen  - Schedule for esophagoscopy and dilation (possible full esophagus) in the endosuite after esophagram. Risks, benefits, and alternatives of esophagoscopy and dilation discussed with the patient, including but not limited to bleeding, infection, pain, need for repeat procedure, no improvement in symptoms, and rarely, esophageal perforation. The patient expressed understanding and agrees to proceed.   - SLP referral for swallow therapy    2. 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 male referred by David Johnson APRN-C* for evaluation of dysphagia and dysphonia.  He has a history of GERD s/p Nissen fundoplication in . The patient reports his voice gets weak and will occasional go out. Has been going on for several years. He also notes issues with food sticking in his throat, also over the past several years.    He saw [facility] for therapy and notes some improvement in his voice. It is more steady.    The dysphagia history includes:      Dysphagia for solids               yes    Dysphagia for liquids              occasional    Dysphagia for pills                  no  Associated weight loss            no    Recent pneumonia/bronchitis  no  GERD/Acid reflux   Prior Nissen on Nexxium as needed, very rare    ADDITIONAL HISTORY  Past Medical History  He has a past medical history of Abnormal findings on diagnostic imaging of " other parts of musculoskeletal system (09/21/2016), BPH (benign prostatic hyperplasia), CKD (chronic kidney disease), Cutaneous abscess of neck (09/23/2021), Cyst of kidney, acquired (12/01/2015), Depression, GERD (gastroesophageal reflux disease), Old myocardial infarction, Old myocardial infarction (06/06/2013), Personal history of other diseases of the circulatory system, Personal history of other diseases of the circulatory system, Personal history of other diseases of the digestive system, Personal history of other diseases of the digestive system, Personal history of other diseases of urinary system, Personal history of other mental and behavioral disorders, Personal history of other specified conditions (09/02/2016), Personal history of other specified conditions, Personal history of other specified conditions, Prostate cancer (Multi), and Trigeminal neuralgia. Surgical History  He has a past surgical history that includes Other surgical history (06/11/2013); Rotator cuff repair (06/11/2013); Other surgical history (09/23/2021); Other surgical history (09/23/2021); Gastric fundoplication (02/25/2015); US guided percutaneous peritoneal or retroperitoneal fluid collection drainage (02/10/2017); US guided abscess drain (02/10/2017); and Prostate surgery.   Social History  He reports that he has never smoked. He has never been exposed to tobacco smoke. He has never used smokeless tobacco. He reports that he does not currently use alcohol. He reports that he does not use drugs. Allergies  Patient has no known allergies.     Family History  Family History   Problem Relation Name Age of Onset    Other (heart trouble) Mother      Kidney disease Father      Heart disease Father      Other (hypoglycemia) Father      Other (heart trouble) Father          REVIEW OF SYSTEMS  All 10 systems were reviewed and negative except for above.      PHYSICAL EXAM  ENT Physical Exam   GENERAL: Well-nourished and developed, alert and  appropriate, no distress, voice [id number]  RESPIRATORY: Breathing quietly, no stridor  HEAD: Normocephalic atraumatic  FACE: Symmetric, no masses or lesions  EYES:  Pupils reactive, sclera clear, external ocular muscles intact, no nystagmus.    EARS:  Pinnae normal. External auditory canals clear and tympanic membranes intact.  NOSE:  No anterior lesions, masses or polyps.  ORAL CAVITY/OROPHARYNX:  Buccal mucosa is moist without lesions or masses, tongue midline and palate elevates symmetrically. Tongue mobility intact.  NECK:  Soft. There is no lymphadenopathy or thyromegaly.  Redundant submental skin but palpable landmarks  NEUROLOGIC:  Cranial nerves II-XII grossly intact.       Last Recorded Vitals  Weight 104 kg (229 lb 1.6 oz).    RESULTS    Patient Reported Outcome Measures  N/A    Laboratory, Radiology, and Pathology  I personally reviewed the following results, with the following interpretation:   [facility] [place], [place] 8/20/24 - CP bar/web, limited eso follow-through        PROCEDURES  Flexible Stroboscopy In Clinic    Date/Time: 9/18/2024 11:04 AM    Performed by: Virginia Delgado MD  Authorized by: Virginia Delgado MD       Flexible Fiberoptic Laryngoscopy with Stroboscopy    Patient failed a mirror exam due to limitations of equipment and the need for stroboscopy to assess glottic vibration and closure.     PREOPERATIVE DIAGNOSIS: Dysphonia    POSTOPERATIVE DIAGNOSIS: Same    PROCEDURE:  Strobovideolaryngoscopy    ANESTHESIA:  Topical    COMPLICATIONS:  None    SPECIMENS:  None    PROCEDURE IN DETAIL: The patient was seated in an upright position.  The nasal cavity was topically decongested and anesthetized.  The stroboscopic microphone was held to the neck at the level of the larynx.  The distal chip video laryngoscope was passed through the nasal cavity.  The nasal cavity and nasopharynx were within normal limits except noted below.  The following findings on stroboscopy were noted:      Tongue Base: no masses or  lesions   Vocal Fold Mobility               Right VF: mobile               Left VF: mobile   TVF Appearance               Edema/Erythema: none               Lesions/vibratory margin irregularities: atrophy   Glottic Closure Pattern: complete    Vibration:               Phase: symmetric    Periodicity: regular                Amplitude: increased                Waveform: normal     Muscle Tension Patterns:  lateral   Other Findings: pooled secretions throughout pharynx    The patient tolerated the procedure well.       ----------------------------------------------------------------------  Virginia Delgado MD, MAEd    [facility]  Department of Otolaryngology - Head and Neck Surgery  [facility]    The total time I spent in care of this patient today (excluding time spent on other billable services) is as follows:    Time Spent  Prep time on day of patient encounter: 10 minutes  Time spent directly with patient, family or caregiver: 20 minutes  Additional Time Spent on Patient Care Activities: 5 minutes  Documentation Time: 10 minutes  Other Time Spent: 0 minutes  Total: 45 minutes

## 2024-09-18 NOTE — H&P (VIEW-ONLY)
"Patient: Adebayo Puentes   MRN: [id number] YOB: 1950   Sex: male Age: 74 y.o.  Date of Service: 2024       ASSESSMENT AND PLAN  I discussed the findings with Adebayo Puentes \"Ed\" and have recommended the followin. Dysphagia to solids > liquids, history of GERD s/p Nissen fundoplication in .  - Esophagram to further characterize the esophagus given his prior Nissen  - Schedule for esophagoscopy and dilation (possible full esophagus) in the endosuite after esophagram. Risks, benefits, and alternatives of esophagoscopy and dilation discussed with the patient, including but not limited to bleeding, infection, pain, need for repeat procedure, no improvement in symptoms, and rarely, esophageal perforation. The patient expressed understanding and agrees to proceed.   - SLP referral for swallow therapy    2. 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 male referred by David Johnson APRN-C* for evaluation of dysphagia and dysphonia.  He has a history of GERD s/p Nissen fundoplication in . The patient reports his voice gets weak and will occasional go out. Has been going on for several years. He also notes issues with food sticking in his throat, also over the past several years.    He saw [facility] for therapy and notes some improvement in his voice. It is more steady.    The dysphagia history includes:      Dysphagia for solids               yes    Dysphagia for liquids              occasional    Dysphagia for pills                  no  Associated weight loss            no    Recent pneumonia/bronchitis  no  GERD/Acid reflux   Prior Nissen on Nexxium as needed, very rare    ADDITIONAL HISTORY  Past Medical History  He has a past medical history of Abnormal findings on diagnostic imaging of " other parts of musculoskeletal system (09/21/2016), BPH (benign prostatic hyperplasia), CKD (chronic kidney disease), Cutaneous abscess of neck (09/23/2021), Cyst of kidney, acquired (12/01/2015), Depression, GERD (gastroesophageal reflux disease), Old myocardial infarction, Old myocardial infarction (06/06/2013), Personal history of other diseases of the circulatory system, Personal history of other diseases of the circulatory system, Personal history of other diseases of the digestive system, Personal history of other diseases of the digestive system, Personal history of other diseases of urinary system, Personal history of other mental and behavioral disorders, Personal history of other specified conditions (09/02/2016), Personal history of other specified conditions, Personal history of other specified conditions, Prostate cancer (Multi), and Trigeminal neuralgia. Surgical History  He has a past surgical history that includes Other surgical history (06/11/2013); Rotator cuff repair (06/11/2013); Other surgical history (09/23/2021); Other surgical history (09/23/2021); Gastric fundoplication (02/25/2015); US guided percutaneous peritoneal or retroperitoneal fluid collection drainage (02/10/2017); US guided abscess drain (02/10/2017); and Prostate surgery.   Social History  He reports that he has never smoked. He has never been exposed to tobacco smoke. He has never used smokeless tobacco. He reports that he does not currently use alcohol. He reports that he does not use drugs. Allergies  Patient has no known allergies.     Family History  Family History   Problem Relation Name Age of Onset    Other (heart trouble) Mother      Kidney disease Father      Heart disease Father      Other (hypoglycemia) Father      Other (heart trouble) Father          REVIEW OF SYSTEMS  All 10 systems were reviewed and negative except for above.      PHYSICAL EXAM  ENT Physical Exam   GENERAL: Well-nourished and developed, alert and  appropriate, no distress, voice [id number]  RESPIRATORY: Breathing quietly, no stridor  HEAD: Normocephalic atraumatic  FACE: Symmetric, no masses or lesions  EYES:  Pupils reactive, sclera clear, external ocular muscles intact, no nystagmus.    EARS:  Pinnae normal. External auditory canals clear and tympanic membranes intact.  NOSE:  No anterior lesions, masses or polyps.  ORAL CAVITY/OROPHARYNX:  Buccal mucosa is moist without lesions or masses, tongue midline and palate elevates symmetrically. Tongue mobility intact.  NECK:  Soft. There is no lymphadenopathy or thyromegaly.  Redundant submental skin but palpable landmarks  NEUROLOGIC:  Cranial nerves II-XII grossly intact.       Last Recorded Vitals  Weight 104 kg (229 lb 1.6 oz).    RESULTS    Patient Reported Outcome Measures  N/A    Laboratory, Radiology, and Pathology  I personally reviewed the following results, with the following interpretation:   [facility] [place] 8/20/24 - CP bar/web, limited eso follow-through        PROCEDURES  Flexible Stroboscopy In Clinic    Date/Time: 9/18/2024 11:04 AM    Performed by: Virginia Delgado MD  Authorized by: Virginia Delgado MD       Flexible Fiberoptic Laryngoscopy with Stroboscopy    Patient failed a mirror exam due to limitations of equipment and the need for stroboscopy to assess glottic vibration and closure.     PREOPERATIVE DIAGNOSIS: Dysphonia    POSTOPERATIVE DIAGNOSIS: Same    PROCEDURE:  Strobovideolaryngoscopy    ANESTHESIA:  Topical    COMPLICATIONS:  None    SPECIMENS:  None    PROCEDURE IN DETAIL: The patient was seated in an upright position.  The nasal cavity was topically decongested and anesthetized.  The stroboscopic microphone was held to the neck at the level of the larynx.  The distal chip video laryngoscope was passed through the nasal cavity.  The nasal cavity and nasopharynx were within normal limits except noted below.  The following findings on stroboscopy were noted:      Tongue Base: no masses or  lesions   Vocal Fold Mobility               Right VF: mobile               Left VF: mobile   TVF Appearance               Edema/Erythema: none               Lesions/vibratory margin irregularities: atrophy   Glottic Closure Pattern: complete    Vibration:               Phase: symmetric    Periodicity: regular                Amplitude: increased                Waveform: normal     Muscle Tension Patterns:  lateral   Other Findings: pooled secretions throughout pharynx    The patient tolerated the procedure well.       ----------------------------------------------------------------------  Virginia Delgado MD, MAEd    [facility]  Department of Otolaryngology - Head and Neck Surgery  [facility]    The total time I spent in care of this patient today (excluding time spent on other billable services) is as follows:    Time Spent  Prep time on day of patient encounter: 10 minutes  Time spent directly with patient, family or caregiver: 20 minutes  Additional Time Spent on Patient Care Activities: 5 minutes  Documentation Time: 10 minutes  Other Time Spent: 0 minutes  Total: 45 minutes

## 2024-09-19 ENCOUNTER — APPOINTMENT (OUTPATIENT)
Dept: UROLOGY | Facility: CLINIC | Age: 74
End: 2024-09-19
Payer: MEDICARE

## 2024-09-19 ENCOUNTER — APPOINTMENT (OUTPATIENT)
Dept: NEPHROLOGY | Facility: CLINIC | Age: 74
End: 2024-09-19
Payer: MEDICARE

## 2024-09-20 ENCOUNTER — TREATMENT (OUTPATIENT)
Dept: SPEECH THERAPY | Facility: CLINIC | Age: 74
End: 2024-09-20
Payer: MEDICARE

## 2024-09-20 DIAGNOSIS — R13.12 OROPHARYNGEAL DYSPHAGIA: ICD-10-CM

## 2024-09-20 DIAGNOSIS — R49.0 MUSCLE TENSION DYSPHONIA: ICD-10-CM

## 2024-09-20 DIAGNOSIS — J38.3 GLOTTIC INSUFFICIENCY: ICD-10-CM

## 2024-09-20 DIAGNOSIS — J38.02 BILATERAL VOCAL CORD PARESIS: Primary | ICD-10-CM

## 2024-09-20 PROCEDURE — 92507 TX SP LANG VOICE COMM INDIV: CPT | Mod: GN

## 2024-09-20 ASSESSMENT — PAIN - FUNCTIONAL ASSESSMENT: PAIN_FUNCTIONAL_ASSESSMENT: 0-10

## 2024-09-20 ASSESSMENT — PAIN SCALES - GENERAL: PAINLEVEL_OUTOF10: 0 - NO PAIN

## 2024-09-20 NOTE — PROGRESS NOTES
"Speech-Language Pathology    [facility] Adult Outpatient Speech-Language Pathology Treatment     Patient Name: Adebayo Puentes \"Ed\"  MRN: [id number]  Today's Date: 9/20/2024     Time Calculation  Start Time: 0900  Stop Time: 0945  Time Calculation (min): 45 min      Current Problem:   1. Bilateral vocal cord paresis        2. Glottic insufficiency        3. Muscle tension dysphonia        4. Oropharyngeal dysphagia          Pain Assessment:  Pain Assessment  Pain Assessment: 0-10  0-10 (Numeric) Pain Score: 0 - No pain    SUBJECTIVE  Patient alert and oriented and ready to participate in office visit this date.     OBJECTIVE  LONG TERM GOAL  Improve overall vocal health to foster increased participation levels at home, work and in the community environment.  Patient will demonstrate reduction in symptoms as evidenced by improved scores on VHI-10, RSI, and/or CSI following treatment.  Improve ability to tolerate the least restrictive diet without signs/symptoms of aspiration. (NEW 9/5/24)     SHORT TERM GOALS  Patient will increase vocal wellness and decrease phono trauma in adherence with clinician prescribed vocal hygiene and wellness program per patient report 80% of his/her day.  Patient will increase ability to produce voice without tension within 5 minute conversational task x 80% accuracy as judged by clinician observation and/or patient report.  Patient will demonstrate independent use of voice/swallow (NEW 9/4/24) techniques x 80% accuracy.  Patient will increase the balance/strength of the respiratory/laryngeal/swallowing (NEW 9/5/24) musculature x 80% accuracy.     ASSESSMENT  Patient reports good follow through of HEP including swallowing and voice exercises with notable improvements. Increased work with throat clear reduction stressed. Throat clear trigger may reduce following upcoming esophagoscopy and dilation (possible full esophagus) with Dr. Delgado.    SWALLOW  This date reviewed the patient's identified safe " "swallowing guidelines including:  STRATEGIES:  - Small bites  - Small, single sips  - Upright for all PO intake  - Medications whole in puree or as best tolerated  - Slow rate     Monitored performance with BOT and pharyngeal strengthening exercises including the Sofia maneuver (x10), gargling (x3) and effortful swallow (x10). Feedback provided as needed. Patient intermittently coughs with the gargling exercises and thus instructed I'm in completing the exercise dry. Model provided and accurate follow through confirmed.      VOICE  Monitored performance with RVT bubble blowing (x9), tongue trilling (x9), humming with a lingual anchor (x9), chanting and saying [m] (x2) + \"uh\" and reciting initial [m] words and phrases (x20) using forward placed voicing. Based on progress added humming with tongue in, increased task completion with increased volume and CTT training targeting increased breath support with conversational phrases. Multimodality cueing, instruction in proper posture and repeated trials improved patient performance accuracy. Encouraged home practice several times a day for limited duration. Introduced habituation tasks as well.    Patient is considering bilateral vocal cord injections with Dr. Delgado in the near future.      PLAN  Continue current plan of care  Progress with POC, as tolerated  Discussed POC with patient  Patient/caregiver agreeable with POC   "

## 2024-09-23 ENCOUNTER — HOSPITAL ENCOUNTER (OUTPATIENT)
Dept: RADIOLOGY | Facility: HOSPITAL | Age: 74
Discharge: HOME | End: 2024-09-23
Payer: MEDICARE

## 2024-09-23 ENCOUNTER — APPOINTMENT (OUTPATIENT)
Dept: RADIOLOGY | Facility: HOSPITAL | Age: 74
End: 2024-09-23
Payer: MEDICARE

## 2024-09-23 DIAGNOSIS — R97.20 ELEVATED PSA: ICD-10-CM

## 2024-09-23 PROCEDURE — 72197 MRI PELVIS W/O & W/DYE: CPT | Performed by: RADIOLOGY

## 2024-09-23 PROCEDURE — 2550000001 HC RX 255 CONTRASTS: Performed by: STUDENT IN AN ORGANIZED HEALTH CARE EDUCATION/TRAINING PROGRAM

## 2024-09-23 PROCEDURE — A9575 INJ GADOTERATE MEGLUMI 0.1ML: HCPCS | Performed by: STUDENT IN AN ORGANIZED HEALTH CARE EDUCATION/TRAINING PROGRAM

## 2024-09-23 PROCEDURE — 72197 MRI PELVIS W/O & W/DYE: CPT

## 2024-09-23 RX ORDER — GADOTERATE MEGLUMINE 376.9 MG/ML
20 INJECTION INTRAVENOUS
Status: COMPLETED | OUTPATIENT
Start: 2024-09-23 | End: 2024-09-23

## 2024-09-24 ENCOUNTER — OFFICE VISIT (OUTPATIENT)
Dept: ORTHOPEDIC SURGERY | Facility: CLINIC | Age: 74
End: 2024-09-24
Payer: MEDICARE

## 2024-09-24 DIAGNOSIS — M72.0 DUPUYTREN'S CONTRACTURE OF BOTH HANDS: ICD-10-CM

## 2024-09-24 PROCEDURE — 99213 OFFICE O/P EST LOW 20 MIN: CPT | Performed by: ORTHOPAEDIC SURGERY

## 2024-09-24 NOTE — PROGRESS NOTES
CHIEF COMPLAINT         Bilateral hand contractures    ASSESSMENT + PLAN    Bilateral hand Dupuytren's contractures    I reviewed the nature of Dupuytren's disease and the intermittently progressive typical clinical course.  I discussed the multitude of treatment options including simple observation, formal open surgical cord excision, needle release, and Xiaflex enzyme injection along with the major risks, benefits, and durability of each option.    The position of the fingers is beginning to impact his ADLs.  He is going to consider whether and when to pursue any surgical intervention.  That would be done under a general anesthetic, at the location of his convenience, starting on whichever side is more limiting at the time.        HISTORY OF PRESENT ILLNESS       Patient is a 74 y.o. left-hand dominant male retiree, who presents today at request of Dr. Lo for evaluation of inability to fully extend multiple digits of both hands.  This has been present and gradually progressive over the last 8 to 10 years.  No single specific recalled trauma.  No difficulty with bending, just straightening.  No numbness or tingling.  No popping, clicking, or subjective instability.  He does not recall any male relatives with similar hand problem.    He is not diabetic or hypothyroid.  He does not smoke.  He has had ORIF left distal radius with a plate in place.      REVIEW OF SYSTEMS       A 30-item multi-system Review Of Systems was obtained on today's intake form.  This was reviewed with the patient and is correct.  The pertinent positives and negatives are listed above.  The form has been scanned separately into the medical record.      PHYSICAL EXAM    Constitutional:    Appears stated age. Well-developed and well-nourished male in no acute distress.  Psychiatric:         Pleasant normal mood and affect. Behavior is appropriate for the situation.   Head:                   Normocephalic and atraumatic.  Eyes:                     Pupils are equal and round.  Cardiovascular:  2+ radial and ulnar pulses. Fingers well-perfused.  Respiratory:        Effort normal. No respiratory distress. Speaking in complete sentences.  Neurologic:       Alert and oriented to person, place, and time.  Skin:                Skin is intact, warm and dry.  Hematologic / Lymphatic:    No lymphedema or lymphangitis.    Extremities / Musculoskeletal:                      Skin of both hands and wrists is intact with no erythema, ecchymosis, or diffuse swelling.  Normal skin drag and coloration.  Full symmetric wrist and forearm motion.  Sensation intact to light touch in all distributions.  Capillary refill less than 2 seconds.    On the right the ring finger has a central cord in the palm with 50 degree MP and 50 degree PIP flexion contractures.  Small finger is less limited with 20 degree MP contracture and full PIP extension.    On the left, small finger has full MP extension but 70 degree PIP flexion contracture.  There is a large nodule in the long proximal segment.  Index has a 10 degree MP flexion contracture but full PIP extension.    Good reversal to full composite flexion bilaterally.  Normal overlying skin.      IMAGING / LABS / EMGs           None pertinent      Past Medical History:   Diagnosis Date    Abnormal findings on diagnostic imaging of other parts of musculoskeletal system 09/21/2016    Abnormal bone radiograph    BPH (benign prostatic hyperplasia)     CKD (chronic kidney disease)     Cutaneous abscess of neck 09/23/2021    Neck abscess    Cyst of kidney, acquired 12/01/2015    Renal cyst    Depression     GERD (gastroesophageal reflux disease)     Old myocardial infarction     History of myocardial infarction    Old myocardial infarction 06/06/2013    Past myocardial infarction    Personal history of other diseases of the circulatory system     History of coronary artery disease    Personal history of other diseases of the circulatory system      History of high blood pressure    Personal history of other diseases of the digestive system     History of gastroesophageal reflux (GERD)    Personal history of other diseases of the digestive system     History of hiatal hernia    Personal history of other diseases of urinary system     History of kidney disease    Personal history of other mental and behavioral disorders     History of depression    Personal history of other specified conditions 09/02/2016    History of abdominal pain    Personal history of other specified conditions     History of chest pain    Personal history of other specified conditions     H/O shortness of breath    Prostate cancer (Multi)     Trigeminal neuralgia        Medication Documentation Review Audit       Reviewed by Virginia Delgado MD (Physician) on 09/18/24 at 1105      Medication Order Taking? Sig Documenting Provider Last Dose Status   amLODIPine (Norvasc) 10 mg tablet [id number] Yes Take 1 tablet (10 mg) by mouth once daily. Mayda Carter MD Taking Active   ascorbic acid, vitamin C, 1,000 mg ER tablet [id number] Yes Take by mouth. Albino Singh MD Taking Active   aspirin 81 mg EC tablet [id number] Yes Take 1 tablet (81 mg) by mouth once daily. Albino Singh MD Taking Active   atenolol (Tenormin) 25 mg tablet [id number] Yes Take 1 tablet (25 mg) by mouth once daily. Mayda Carter MD Taking Active   atorvastatin (Lipitor) 40 mg tablet [id number] Yes Take 1 tablet (40 mg) by mouth once daily. Mayda Carter MD Taking Active   calcium carbonate-vitamin D3 (Caltrate with Vitamin D3) 600 mg-20 mcg (800 unit) tablet [id number] Yes Take by mouth twice a day. Albino Singh MD Taking Active   carBAMazepine (TEGretol) 100 mg chewable tablet [id number] Yes Chew 1 tablet (100 mg) early in the morning.. Mayda Carter MD Taking Active   cholecalciferol (Vitamin D3) 5,000 Units tablet [id number] Yes Take 1 tablet (5,000 Units) by mouth 1 (one) time per week. Albino Singh  MD Taking Active   escitalopram (Lexapro) 20 mg tablet [id number] Yes Take 1 tablet (20 mg) by mouth once daily. Mayda Carter MD Taking Active   esomeprazole (NexIUM) 20 mg DR capsule [id number] Yes Take 1 capsule (20 mg) by mouth once daily. Do not open capsule. Historical Provider, MD Taking Active   losartan-hydrochlorothiazide (Hyzaar) 100-25 mg tablet [id number] Yes Take 1 tablet by mouth once daily. Mayda Carter MD Taking Active   montelukast (Singulair) 10 mg tablet [id number] Yes Take 1 tablet (10 mg) by mouth once daily. Mayda Carter MD Taking Active   multivitamin with minerals iron-free (Centrum Silver) [id number] Yes Take 1 tablet by mouth. Historical Provider, MD Taking Active   mv-min-folic acid-lutein (Centrum Silver) 400-250 mcg tablet,chewable [id number] Yes Chew 1 tablet once daily. Historical Provider, MD Taking Active   Myrbetriq 50 mg tablet extended release 24 hr 24 hr tablet [id number] Yes Take by mouth. Historical Provider, MD Taking Active   traZODone (Desyrel) 100 mg tablet [id number] Yes Take 1 tablet (100 mg) by mouth once daily at bedtime. Mayda Carter MD Taking Active                    No Known Allergies    Social History     Socioeconomic History    Marital status:      Spouse name: Not on file    Number of children: Not on file    Years of education: Not on file    Highest education level: Not on file   Occupational History    Not on file   Tobacco Use    Smoking status: Never     Passive exposure: Never    Smokeless tobacco: Never   Vaping Use    Vaping status: Never Used   Substance and Sexual Activity    Alcohol use: Not Currently     Comment: social    Drug use: Never    Sexual activity: Not on file   Other Topics Concern    Not on file   Social History Narrative    Not on file     Social Determinants of Health     Financial Resource Strain: Not on file   Food Insecurity: Not on file   Transportation Needs: Not on file   Physical Activity: Not on file   Stress: Not on file    Social Connections: Not on file   Intimate Partner Violence: Not on file   Housing Stability: Not on file       Past Surgical History:   Procedure Laterality Date    GASTRIC FUNDOPLICATION  02/25/2015    Esophagogastric Fundoplasty Nissen Fundoplication    OTHER SURGICAL HISTORY  06/11/2013    Wrist Surgery    OTHER SURGICAL HISTORY  09/23/2021    Neck surgery    OTHER SURGICAL HISTORY  09/23/2021    Transurethral resection of prostate    PROSTATE SURGERY      ROTATOR CUFF REPAIR  06/11/2013    Rotator Cuff Repair    US GUIDED ABSCESS DRAIN  02/10/2017    US GUIDED ABSCESS DRAIN 2/10/2017 [facility] CLINICAL LEGACY    US GUIDED PERCUTANEOUS PERITONEAL OR RETROPERITONEAL FLUID COLLECTION DRAINAGE  02/10/2017    US GUIDED PERCUTANEOUS PERITONEAL OR RETROPERITONEAL FLUID COLLECTION DRAINAGE 2/10/2017 [facility] CLINICAL LEGACY         ATTESTATION    I saw and evaluated the patient. I personally obtained the key and critical portions of the history and physical exam or was physically present for key and critical portions performed by the resident/fellow. I reviewed the resident/fellow's documentation and discussed the patient with the resident/fellow. I agree with the resident/fellow's medical decision making as documented in the note.        Electronically signed  BHAVNA Dupree MD  [phone]

## 2024-09-24 NOTE — LETTER
October 1, 2024     Mayda Carter MD  [address]  [facility]  [facility] [address]    Patient: Sachin Puentes   YOB: 1950   Date of Visit: 9/24/2024       Dear Dr. Mayda Carter MD:    Thank you for referring Sachin Puentes to me for evaluation. Below are my notes for this consultation.  If you have questions, please do not hesitate to call me. I look forward to following your patient along with you.       Sincerely,     [place]ana Dupree MD      CC: Lyssa Lo MD  ______________________________________________________________________________________    CHIEF COMPLAINT         Bilateral hand contractures    ASSESSMENT + PLAN    Bilateral hand Dupuytren's contractures    I reviewed the nature of Dupuytren's disease and the intermittently progressive typical clinical course.  I discussed the multitude of treatment options including simple observation, formal open surgical cord excision, needle release, and Xiaflex enzyme injection along with the major risks, benefits, and durability of each option.    The position of the fingers is beginning to impact his ADLs.  He is going to consider whether and when to pursue any surgical intervention.  That would be done under a general anesthetic, at the location of his convenience, starting on whichever side is more limiting at the time.        HISTORY OF PRESENT ILLNESS       Patient is a 74 y.o. left-hand dominant male retiree, who presents today at request of Dr. Lo for evaluation of inability to fully extend multiple digits of both hands.  This has been present and gradually progressive over the last 8 to 10 years.  No single specific recalled trauma.  No difficulty with bending, just straightening.  No numbness or tingling.  No popping, clicking, or subjective instability.  He does not recall any male relatives with similar hand problem.    He is not diabetic or hypothyroid.  He does not smoke.  He has had ORIF left distal radius with a  plate in place.      REVIEW OF SYSTEMS       A 30-item multi-system Review Of Systems was obtained on today's intake form.  This was reviewed with the patient and is correct.  The pertinent positives and negatives are listed above.  The form has been scanned separately into the medical record.      PHYSICAL EXAM    Constitutional:    Appears stated age. Well-developed and well-nourished male in no acute distress.  Psychiatric:         Pleasant normal mood and affect. Behavior is appropriate for the situation.   Head:                   Normocephalic and atraumatic.  Eyes:                    Pupils are equal and round.  Cardiovascular:  2+ radial and ulnar pulses. Fingers well-perfused.  Respiratory:        Effort normal. No respiratory distress. Speaking in complete sentences.  Neurologic:       Alert and oriented to person, place, and time.  Skin:                Skin is intact, warm and dry.  Hematologic / Lymphatic:    No lymphedema or lymphangitis.    Extremities / Musculoskeletal:                      Skin of both hands and wrists is intact with no erythema, ecchymosis, or diffuse swelling.  Normal skin drag and coloration.  Full symmetric wrist and forearm motion.  Sensation intact to light touch in all distributions.  Capillary refill less than 2 seconds.    On the right the ring finger has a central cord in the palm with 50 degree MP and 50 degree PIP flexion contractures.  Small finger is less limited with 20 degree MP contracture and full PIP extension.    On the left, small finger has full MP extension but 70 degree PIP flexion contracture.  There is a large nodule in the long proximal segment.  Index has a 10 degree MP flexion contracture but full PIP extension.    Good reversal to full composite flexion bilaterally.  Normal overlying skin.      IMAGING / LABS / EMGs           None pertinent      Past Medical History:   Diagnosis Date   • Abnormal findings on diagnostic imaging of other parts of  musculoskeletal system 09/21/2016    Abnormal bone radiograph   • BPH (benign prostatic hyperplasia)    • CKD (chronic kidney disease)    • Cutaneous abscess of neck 09/23/2021    Neck abscess   • Cyst of kidney, acquired 12/01/2015    Renal cyst   • Depression    • GERD (gastroesophageal reflux disease)    • Old myocardial infarction     History of myocardial infarction   • Old myocardial infarction 06/06/2013    Past myocardial infarction   • Personal history of other diseases of the circulatory system     History of coronary artery disease   • Personal history of other diseases of the circulatory system     History of high blood pressure   • Personal history of other diseases of the digestive system     History of gastroesophageal reflux (GERD)   • Personal history of other diseases of the digestive system     History of hiatal hernia   • Personal history of other diseases of urinary system     History of kidney disease   • Personal history of other mental and behavioral disorders     History of depression   • Personal history of other specified conditions 09/02/2016    History of abdominal pain   • Personal history of other specified conditions     History of chest pain   • Personal history of other specified conditions     H/O shortness of breath   • Prostate cancer (Multi)    • Trigeminal neuralgia        Medication Documentation Review Audit       Reviewed by Virginia Delgado MD (Physician) on 09/18/24 at 1105      Medication Order Taking? Sig Documenting Provider Last Dose Status   amLODIPine (Norvasc) 10 mg tablet [id number] Yes Take 1 tablet (10 mg) by mouth once daily. Mayda Carter MD Taking Active   ascorbic acid, vitamin C, 1,000 mg ER tablet [id number] Yes Take by mouth. Historical Provider, MD Taking Active   aspirin 81 mg EC tablet [id number] Yes Take 1 tablet (81 mg) by mouth once daily. Historical Provider, MD Taking Active   atenolol (Tenormin) 25 mg tablet [id number] Yes Take 1 tablet (25 mg) by mouth  once daily. Mayda Carter MD Taking Active   atorvastatin (Lipitor) 40 mg tablet [id number] Yes Take 1 tablet (40 mg) by mouth once daily. Mayda Carter MD Taking Active   calcium carbonate-vitamin D3 (Caltrate with Vitamin D3) 600 mg-20 mcg (800 unit) tablet [id number] Yes Take by mouth twice a day. Albino Singh MD Taking Active   carBAMazepine (TEGretol) 100 mg chewable tablet [id number] Yes Chew 1 tablet (100 mg) early in the morning.. Mayda Carter MD Taking Active   cholecalciferol (Vitamin D3) 5,000 Units tablet [id number] Yes Take 1 tablet (5,000 Units) by mouth 1 (one) time per week. Albino Singh MD Taking Active   escitalopram (Lexapro) 20 mg tablet [id number] Yes Take 1 tablet (20 mg) by mouth once daily. Mayda Carter MD Taking Active   esomeprazole (NexIUM) 20 mg DR capsule [id number] Yes Take 1 capsule (20 mg) by mouth once daily. Do not open capsule. Albino Singh MD Taking Active   losartan-hydrochlorothiazide (Hyzaar) 100-25 mg tablet [id number] Yes Take 1 tablet by mouth once daily. Mayda Carter MD Taking Active   montelukast (Singulair) 10 mg tablet [id number] Yes Take 1 tablet (10 mg) by mouth once daily. Mayda Carter MD Taking Active   multivitamin with minerals iron-free (Centrum Silver) [id number] Yes Take 1 tablet by mouth. Albino Singh MD Taking Active   mv-min-folic acid-lutein (Centrum Silver) 400-250 mcg tablet,chewable [id number] Yes Chew 1 tablet once daily. Albino Singh MD Taking Active   Myrbetriq 50 mg tablet extended release 24 hr 24 hr tablet [id number] Yes Take by mouth. Albino Singh MD Taking Active   traZODone (Desyrel) 100 mg tablet [id number] Yes Take 1 tablet (100 mg) by mouth once daily at bedtime. Mayda Carter MD Taking Active                    No Known Allergies    Social History     Socioeconomic History   • Marital status:      Spouse name: Not on file   • Number of children: Not on file   • Years of education: Not on  file   • Highest education level: Not on file   Occupational History   • Not on file   Tobacco Use   • Smoking status: Never     Passive exposure: Never   • Smokeless tobacco: Never   Vaping Use   • Vaping status: Never Used   Substance and Sexual Activity   • Alcohol use: Not Currently     Comment: social   • Drug use: Never   • Sexual activity: Not on file   Other Topics Concern   • Not on file   Social History Narrative   • Not on file     Social Determinants of Health     Financial Resource Strain: Not on file   Food Insecurity: Not on file   Transportation Needs: Not on file   Physical Activity: Not on file   Stress: Not on file   Social Connections: Not on file   Intimate Partner Violence: Not on file   Housing Stability: Not on file       Past Surgical History:   Procedure Laterality Date   • GASTRIC FUNDOPLICATION  02/25/2015    Esophagogastric Fundoplasty Nissen Fundoplication   • OTHER SURGICAL HISTORY  06/11/2013    Wrist Surgery   • OTHER SURGICAL HISTORY  09/23/2021    Neck surgery   • OTHER SURGICAL HISTORY  09/23/2021    Transurethral resection of prostate   • PROSTATE SURGERY     • ROTATOR CUFF REPAIR  06/11/2013    Rotator Cuff Repair   • US GUIDED ABSCESS DRAIN  02/10/2017    US GUIDED ABSCESS DRAIN 2/10/2017 [facility] CL[facility]ICAL LEGAC[facility]   • US GUIDED PERCUTANEOUS PERITONEAL OR RETROPERITONEAL FLUID COLLECTION DRAINAGE  02/10/2017    US GUIDED PERCUTANEOUS PERITONEAL OR RETROPERITONEAL FLUID COLLECTION DRAINAGE 2/10/2017 [facility] CLINICAL LEGACY         ATTESTATION    I saw and evaluated the patient. I personally obtained the key and critical portions of the history and physical exam or was physically present for key and critical portions performed by the resident/fellow. I reviewed the resident/fellow's documentation and discussed the patient with the resident/fellow. I agree with the resident/fellow's medical decision making as documented in the note.        Electronically signed  BHAVNA Dupree  MD  [phone]

## 2024-09-25 ENCOUNTER — HOSPITAL ENCOUNTER (OUTPATIENT)
Dept: RADIOLOGY | Facility: HOSPITAL | Age: 74
Discharge: HOME | End: 2024-09-25
Payer: MEDICARE

## 2024-09-25 DIAGNOSIS — R13.10 DYSPHAGIA, UNSPECIFIED TYPE: ICD-10-CM

## 2024-09-25 PROCEDURE — 2500000005 HC RX 250 GENERAL PHARMACY W/O HCPCS: Performed by: OTOLARYNGOLOGY

## 2024-09-25 PROCEDURE — 74220 X-RAY XM ESOPHAGUS 1CNTRST: CPT

## 2024-09-25 PROCEDURE — A9698 NON-RAD CONTRAST MATERIALNOC: HCPCS | Performed by: OTOLARYNGOLOGY

## 2024-09-25 PROCEDURE — 74220 X-RAY XM ESOPHAGUS 1CNTRST: CPT | Performed by: STUDENT IN AN ORGANIZED HEALTH CARE EDUCATION/TRAINING PROGRAM

## 2024-09-25 PROCEDURE — 2500000001 HC RX 250 WO HCPCS SELF ADMINISTERED DRUGS (ALT 637 FOR MEDICARE OP): Performed by: OTOLARYNGOLOGY

## 2024-09-28 NOTE — DISCHARGE INSTRUCTIONS
Dr. Schmitt - [facility]  Phone: [phone]    Follow-Up Information    Following your Prostate Biopsy, please follow up with Dr. Schmitt as scheduled. Direct lines: [phone] (Oskar, leave message and will call back) or [phone] (Leyda)    Medication  Please take the medications as prescribed by your doctor. Tylenol or non-steroidal anti-inflammatory medications (such as Aleve®) should relieve mild pain and discomfort. Resume the usual medications you took before surgery unless instructed otherwise.    Resuming Activities and Driving  *NO Driving on the day of surgery  *You may resume driving the day after surgery  *You may resume normal activities as tolerated  *You may shower     Diet  You may resume your normal diet once at home, with no additional considerations.    Expected Signs and Symptoms  You may have a small amount of bleeding with urination on occasion. This may be accompanied with small blood clots. This is normal and should be relieved by increasing your fluid intake.  You may experience some mild burning and discomfort during urination. This is normal and should subside in one to two weeks.      When to Call Your Doctor - Dr. Schmitt [phone]  Please call the office immediately if any of the following symptoms appear:    *Inability to eat, drink, or take medication  *Persistent Nausea or Vomiting  *Fever over 100.4 degrees F. (38 degrees C.)    Please call 9-1-1 if you experience any of the following:  *Chest Pain, Shortness of Breath or Difficulty Breathing  *Sudden one sided weakness/slurred speech/ any signs or symptoms of a stroke  *Severe headache or visual disturbance    Additional Home-Going Instructions for Adults Who Have Had Anesthesia or Sedatives:    The anesthetics, sedatives and pain killers which were given to you will be acting in your body for the next 24 hours.  This may cause you to feel sleepy.  This feeling will slowly wear off.    For the next 24 hours you  SHOULD NOT:  *Drive a car, or operate machinery or power tools  *Drink any form of alcohol (including beer or wine)  *Make any important Decisions

## 2024-10-01 ENCOUNTER — HOSPITAL ENCOUNTER (OUTPATIENT)
Facility: HOSPITAL | Age: 74
Setting detail: OUTPATIENT SURGERY
Discharge: HOME | End: 2024-10-01
Attending: STUDENT IN AN ORGANIZED HEALTH CARE EDUCATION/TRAINING PROGRAM | Admitting: STUDENT IN AN ORGANIZED HEALTH CARE EDUCATION/TRAINING PROGRAM
Payer: MEDICARE

## 2024-10-01 ENCOUNTER — ANESTHESIA (OUTPATIENT)
Dept: OPERATING ROOM | Facility: HOSPITAL | Age: 74
End: 2024-10-01
Payer: MEDICARE

## 2024-10-01 ENCOUNTER — ANESTHESIA EVENT (OUTPATIENT)
Dept: OPERATING ROOM | Facility: HOSPITAL | Age: 74
End: 2024-10-01
Payer: MEDICARE

## 2024-10-01 VITALS
DIASTOLIC BLOOD PRESSURE: 87 MMHG | SYSTOLIC BLOOD PRESSURE: 124 MMHG | OXYGEN SATURATION: 97 % | RESPIRATION RATE: 16 BRPM | HEIGHT: 74 IN | BODY MASS INDEX: 29.65 KG/M2 | WEIGHT: 231.04 LBS | HEART RATE: 50 BPM | TEMPERATURE: 97.2 F

## 2024-10-01 DIAGNOSIS — C61 PROSTATE CANCER (MULTI): ICD-10-CM

## 2024-10-01 PROCEDURE — 7100000010 HC PHASE TWO TIME - EACH INCREMENTAL 1 MINUTE: Performed by: STUDENT IN AN ORGANIZED HEALTH CARE EDUCATION/TRAINING PROGRAM

## 2024-10-01 PROCEDURE — 3600000002 HC OR TIME - INITIAL BASE CHARGE - PROCEDURE LEVEL TWO: Performed by: STUDENT IN AN ORGANIZED HEALTH CARE EDUCATION/TRAINING PROGRAM

## 2024-10-01 PROCEDURE — A55706 PR BIOPSY OF PROSTATE,NEEDLE,TRANSPERINEAL: Performed by: STUDENT IN AN ORGANIZED HEALTH CARE EDUCATION/TRAINING PROGRAM

## 2024-10-01 PROCEDURE — 3600000007 HC OR TIME - EACH INCREMENTAL 1 MINUTE - PROCEDURE LEVEL TWO: Performed by: STUDENT IN AN ORGANIZED HEALTH CARE EDUCATION/TRAINING PROGRAM

## 2024-10-01 PROCEDURE — 2500000004 HC RX 250 GENERAL PHARMACY W/ HCPCS (ALT 636 FOR OP/ED): Performed by: STUDENT IN AN ORGANIZED HEALTH CARE EDUCATION/TRAINING PROGRAM

## 2024-10-01 PROCEDURE — 7100000009 HC PHASE TWO TIME - INITIAL BASE CHARGE: Performed by: STUDENT IN AN ORGANIZED HEALTH CARE EDUCATION/TRAINING PROGRAM

## 2024-10-01 PROCEDURE — 2720000007 HC OR 272 NO HCPCS: Performed by: STUDENT IN AN ORGANIZED HEALTH CARE EDUCATION/TRAINING PROGRAM

## 2024-10-01 PROCEDURE — C1819 TISSUE LOCALIZATION-EXCISION: HCPCS | Performed by: STUDENT IN AN ORGANIZED HEALTH CARE EDUCATION/TRAINING PROGRAM

## 2024-10-01 PROCEDURE — 55706 BX PRST8 NDL SAT SAMPLING: CPT | Performed by: STUDENT IN AN ORGANIZED HEALTH CARE EDUCATION/TRAINING PROGRAM

## 2024-10-01 PROCEDURE — G0416 PROSTATE BIOPSY, ANY MTHD: HCPCS | Mod: TC,SUR,BEALAB,WESLAB | Performed by: STUDENT IN AN ORGANIZED HEALTH CARE EDUCATION/TRAINING PROGRAM

## 2024-10-01 PROCEDURE — G0416 PROSTATE BIOPSY, ANY MTHD: HCPCS | Performed by: STUDENT IN AN ORGANIZED HEALTH CARE EDUCATION/TRAINING PROGRAM

## 2024-10-01 PROCEDURE — 99100 ANES PT EXTEME AGE<1 YR&>70: CPT | Performed by: STUDENT IN AN ORGANIZED HEALTH CARE EDUCATION/TRAINING PROGRAM

## 2024-10-01 PROCEDURE — 3700000001 HC GENERAL ANESTHESIA TIME - INITIAL BASE CHARGE: Performed by: STUDENT IN AN ORGANIZED HEALTH CARE EDUCATION/TRAINING PROGRAM

## 2024-10-01 PROCEDURE — 76872 US TRANSRECTAL: CPT | Performed by: STUDENT IN AN ORGANIZED HEALTH CARE EDUCATION/TRAINING PROGRAM

## 2024-10-01 PROCEDURE — 7100000002 HC RECOVERY ROOM TIME - EACH INCREMENTAL 1 MINUTE: Performed by: STUDENT IN AN ORGANIZED HEALTH CARE EDUCATION/TRAINING PROGRAM

## 2024-10-01 PROCEDURE — 3700000002 HC GENERAL ANESTHESIA TIME - EACH INCREMENTAL 1 MINUTE: Performed by: STUDENT IN AN ORGANIZED HEALTH CARE EDUCATION/TRAINING PROGRAM

## 2024-10-01 PROCEDURE — 7100000001 HC RECOVERY ROOM TIME - INITIAL BASE CHARGE: Performed by: STUDENT IN AN ORGANIZED HEALTH CARE EDUCATION/TRAINING PROGRAM

## 2024-10-01 RX ORDER — FENTANYL CITRATE 50 UG/ML
50 INJECTION, SOLUTION INTRAMUSCULAR; INTRAVENOUS EVERY 5 MIN PRN
Status: DISCONTINUED | OUTPATIENT
Start: 2024-10-01 | End: 2024-10-01 | Stop reason: HOSPADM

## 2024-10-01 RX ORDER — BUPIVACAINE HYDROCHLORIDE 5 MG/ML
INJECTION, SOLUTION PERINEURAL AS NEEDED
Status: DISCONTINUED | OUTPATIENT
Start: 2024-10-01 | End: 2024-10-01 | Stop reason: HOSPADM

## 2024-10-01 RX ORDER — ALBUTEROL SULFATE 0.83 MG/ML
2.5 SOLUTION RESPIRATORY (INHALATION) ONCE AS NEEDED
Status: DISCONTINUED | OUTPATIENT
Start: 2024-10-01 | End: 2024-10-01 | Stop reason: HOSPADM

## 2024-10-01 RX ORDER — LABETALOL HYDROCHLORIDE 5 MG/ML
5 INJECTION, SOLUTION INTRAVENOUS ONCE AS NEEDED
Status: DISCONTINUED | OUTPATIENT
Start: 2024-10-01 | End: 2024-10-01 | Stop reason: HOSPADM

## 2024-10-01 RX ORDER — CEFTRIAXONE 2 G/50ML
2 INJECTION, SOLUTION INTRAVENOUS ONCE
Status: COMPLETED | OUTPATIENT
Start: 2024-10-01 | End: 2024-10-01

## 2024-10-01 RX ORDER — SODIUM CHLORIDE, SODIUM LACTATE, POTASSIUM CHLORIDE, CALCIUM CHLORIDE 600; 310; 30; 20 MG/100ML; MG/100ML; MG/100ML; MG/100ML
20 INJECTION, SOLUTION INTRAVENOUS CONTINUOUS
Status: DISCONTINUED | OUTPATIENT
Start: 2024-10-01 | End: 2024-10-01 | Stop reason: HOSPADM

## 2024-10-01 RX ORDER — DIPHENHYDRAMINE HYDROCHLORIDE 50 MG/ML
12.5 INJECTION INTRAMUSCULAR; INTRAVENOUS ONCE AS NEEDED
Status: DISCONTINUED | OUTPATIENT
Start: 2024-10-01 | End: 2024-10-01 | Stop reason: HOSPADM

## 2024-10-01 RX ORDER — SODIUM CHLORIDE, SODIUM LACTATE, POTASSIUM CHLORIDE, CALCIUM CHLORIDE 600; 310; 30; 20 MG/100ML; MG/100ML; MG/100ML; MG/100ML
100 INJECTION, SOLUTION INTRAVENOUS CONTINUOUS
Status: DISCONTINUED | OUTPATIENT
Start: 2024-10-01 | End: 2024-10-01 | Stop reason: HOSPADM

## 2024-10-01 RX ORDER — MEPERIDINE HYDROCHLORIDE 25 MG/ML
12.5 INJECTION INTRAMUSCULAR; INTRAVENOUS; SUBCUTANEOUS EVERY 10 MIN PRN
Status: DISCONTINUED | OUTPATIENT
Start: 2024-10-01 | End: 2024-10-01 | Stop reason: HOSPADM

## 2024-10-01 RX ORDER — HYDRALAZINE HYDROCHLORIDE 20 MG/ML
5 INJECTION INTRAMUSCULAR; INTRAVENOUS EVERY 30 MIN PRN
Status: DISCONTINUED | OUTPATIENT
Start: 2024-10-01 | End: 2024-10-01 | Stop reason: HOSPADM

## 2024-10-01 RX ORDER — PROPOFOL 10 MG/ML
INJECTION, EMULSION INTRAVENOUS AS NEEDED
Status: DISCONTINUED | OUTPATIENT
Start: 2024-10-01 | End: 2024-10-01

## 2024-10-01 RX ORDER — FENTANYL CITRATE 50 UG/ML
25 INJECTION, SOLUTION INTRAMUSCULAR; INTRAVENOUS EVERY 5 MIN PRN
Status: DISCONTINUED | OUTPATIENT
Start: 2024-10-01 | End: 2024-10-01 | Stop reason: HOSPADM

## 2024-10-01 RX ORDER — IPRATROPIUM BROMIDE 0.5 MG/2.5ML
500 SOLUTION RESPIRATORY (INHALATION) AS NEEDED
Status: DISCONTINUED | OUTPATIENT
Start: 2024-10-01 | End: 2024-10-01 | Stop reason: HOSPADM

## 2024-10-01 RX ORDER — PROCHLORPERAZINE EDISYLATE 5 MG/ML
5 INJECTION INTRAMUSCULAR; INTRAVENOUS ONCE AS NEEDED
Status: DISCONTINUED | OUTPATIENT
Start: 2024-10-01 | End: 2024-10-01 | Stop reason: HOSPADM

## 2024-10-01 RX ORDER — ONDANSETRON HYDROCHLORIDE 2 MG/ML
4 INJECTION, SOLUTION INTRAVENOUS ONCE AS NEEDED
Status: DISCONTINUED | OUTPATIENT
Start: 2024-10-01 | End: 2024-10-01 | Stop reason: HOSPADM

## 2024-10-01 ASSESSMENT — COLUMBIA-SUICIDE SEVERITY RATING SCALE - C-SSRS
2. HAVE YOU ACTUALLY HAD ANY THOUGHTS OF KILLING YOURSELF?: NO
1. IN THE PAST MONTH, HAVE YOU WISHED YOU WERE DEAD OR WISHED YOU COULD GO TO SLEEP AND NOT WAKE UP?: NO
6. HAVE YOU EVER DONE ANYTHING, STARTED TO DO ANYTHING, OR PREPARED TO DO ANYTHING TO END YOUR LIFE?: NO

## 2024-10-01 ASSESSMENT — PAIN SCALES - GENERAL
PAINLEVEL_OUTOF10: 0 - NO PAIN

## 2024-10-01 ASSESSMENT — PAIN - FUNCTIONAL ASSESSMENT
PAIN_FUNCTIONAL_ASSESSMENT: 0-10

## 2024-10-01 NOTE — ANESTHESIA PREPROCEDURE EVALUATION
"Patient: Adebayo Puentes \"Ed\"    Procedure Information       Date/Time: 10/01/24 0750    Procedure: Biopsy Prostate (Fort[facility]/uronav/precisionpoint/MRI:[facility]) (Bilateral)    Location: [facility] / [facility]    Surgeons: Skyler Schmitt MD            Relevant Problems   Anesthesia (within normal limits)      Cardiac   (+) Atherosclerotic heart disease of native coronary artery without angina pectoris   (+) CAD (coronary artery disease)   (+) Essential (primary) hypertension   (+) Hyperlipidemia   (+) Hypertension   (+) Mixed hyperlipidemia   (-) History of coronary artery bypass graft   (-) Pacemaker      Pulmonary   (-) Asthma   (-) Chronic obstructive pulmonary disease (Multi)   (-) MELINDA (obstructive sleep apnea)      Neuro   (+) Depression   (+) Idiopathic peripheral neuropathy   (+) Major depressive disorder, single episode, unspecified   (+) Recurrent major depressive disorder (CMS-HCC)   (+) Right trigeminal neuralgia   (+) Trigeminus neuralgia   (-) CVA (cerebral vascular accident) (Multi)   (-) Seizures (Multi)      GI   (+) Dysphagia   (+) GERD (gastroesophageal reflux disease)      /Renal   (+) BPH (benign prostatic hyperplasia)   (+) BPH with obstruction/lower urinary tract symptoms   (+) Nephrolithiasis   (+) Prostate cancer (Multi)      Endocrine   (+) 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)   (-) Diabetes mellitus, type 2 (Multi)      Hematology   (-) Coagulopathy (Multi)      HEENT   (+) Bilateral sensorineural hearing loss   (+) Seasonal allergies       Clinical information reviewed:   Tobacco  Allergies  Meds  Problems  Med Hx  Surg Hx   Fam Hx  Soc   Hx        NPO Detail:  NPO/Void Status  Date of Last Liquid: 09/30/24  Time of Last Liquid: 1930  Date of Last Solid: 09/30/24  Time of Last Solid: 1900  Time of Last Void: 0700         Physical Exam    Airway  Mallampati: I  TM distance: >3 FB  Neck ROM: full     Cardiovascular    Dental    Pulmonary    Abdominal  "           Anesthesia Plan    History of general anesthesia?: yes  History of complications of general anesthesia?: no    ASA 2     MAC     intravenous induction   Postoperative administration of opioids is intended.  Anesthetic plan and risks discussed with patient.

## 2024-10-01 NOTE — OP NOTE
"Biopsy Prostate [place]Fortec[place]uronav/[place] [place]ative Note     Date: 10/1/2024  OR Location: [facility]    Name: Adebayo Puentes \"[facility]\", : 1950, Age: 74 y.o., MRN: [id number], Sex: male    Diagnosis  Pre-op Diagnosis      * Prostate cancer (Multi) [C61] Post-op Diagnosis     * Prostate cancer (Multi) [C61]     Procedures  Biopsy Prostate (Fortec/uronav/precisionpoint[place] [place]  59458 - KS PROSTATE NEEDLE BIOPSY ANY APPROACH    KS PROSTATE NEEDLE BIOPSY ANY APPROACH [12933]  CHG US TRANSRECTAL [11663]  Surgeons      * Skyler Schmitt - Primary        Estimated Blood Loss (ml)  5.   Specimen(s) Removed  Removed region of interest (cognitive) and systematic biopsies.   Drain(s)  None.   Implant(s)  None.   Complications  None.   Indications (History)  Elevated PSA.   Findings of Procedure  30g prostate sp tur, esperanza heterogenous, >25 cores.   Description of Procedure  After administration of anesthesia, a prostate block was performed and transrectal ultrasound. Transperineal biopsies taken from region of interest and systematic template performed using the precision point device.       "

## 2024-10-01 NOTE — ANESTHESIA POSTPROCEDURE EVALUATION
"Patient: Adebayo Puentes \"Ed\"    Procedure Summary       Date: 10/01/24 Room / Location: [facility] 01 / [facility]    Anesthesia Start: 0804 Anesthesia Stop: 0843    Procedure: Biopsy Prostate (Fortec/uronav/precisionpoint/MRI:[facility]) (Bilateral) Diagnosis:       Prostate cancer (Multi)      (Prostate cancer (Multi) [C61])    Surgeons: Skyler Schmitt MD Responsible Provider: Amos Flor MD    Anesthesia Type: MAC ASA Status: 2            Anesthesia Type: MAC    Vitals Value Taken Time   /85 10/01/24 0905   Temp 36.3 °C (97.3 °F) 10/01/24 0905   Pulse 53 10/01/24 0905   Resp 16 10/01/24 0905   SpO2 96 % 10/01/24 0905       Anesthesia Post Evaluation    Patient location during evaluation: PACU  Patient participation: complete - patient participated  Level of consciousness: awake  Pain management: adequate  Multimodal analgesia pain management approach  Airway patency: patent  Cardiovascular status: acceptable and hemodynamically stable  Respiratory status: acceptable and spontaneous ventilation  Hydration status: euvolemic  Postoperative Nausea and Vomiting: none  Comments: No nausea or vomiting        There were no known notable events for this encounter.    "

## 2024-10-01 NOTE — NURSING NOTE
Patient in Phase 2; dressed and up to chair with RN assist. Tolerating po fluids, no complaint of pain and no complaint of nausea.     Family at bedside; discussed discharge instructions with patient and Family. All questions at this time answered.     Patient and family instructed to go to emergency room in unsuccessful in voiding by 5pm    Patient clinically appropriate for discharge. IV removed and patient transported to discharge area via wheelchair.

## 2024-10-01 NOTE — H&P
"History Of Present Illness  Adebayo Puentes \"Ed\" is a 74 y.o. male presenting with prostate cancer.     Past Medical History  Past Medical History:   Diagnosis Date    Abnormal findings on diagnostic imaging of other parts of musculoskeletal system 09/21/2016    Abnormal bone radiograph    BPH (benign prostatic hyperplasia)     CKD (chronic kidney disease)     Cutaneous abscess of neck 09/23/2021    Neck abscess    Cyst of kidney, acquired 12/01/2015    Renal cyst    Depression     GERD (gastroesophageal reflux disease)     Hypertension     Old myocardial infarction     History of myocardial infarction    Old myocardial infarction 06/06/2013    Past myocardial infarction    Personal history of other diseases of the circulatory system     History of coronary artery disease    Personal history of other diseases of the circulatory system     History of high blood pressure    Personal history of other diseases of the digestive system     History of gastroesophageal reflux (GERD)    Personal history of other diseases of the digestive system     History of hiatal hernia    Personal history of other diseases of urinary system     History of kidney disease    Personal history of other mental and behavioral disorders     History of depression    Personal history of other specified conditions 09/02/2016    History of abdominal pain    Personal history of other specified conditions     History of chest pain    Personal history of other specified conditions     H/O shortness of breath    Prostate cancer (Multi)     Trigeminal neuralgia        Surgical History  Past Surgical History:   Procedure Laterality Date    GASTRIC FUNDOPLICATION  02/25/2015    Esophagogastric Fundoplasty Nissen Fundoplication    OTHER SURGICAL HISTORY  06/11/2013    Wrist Surgery    OTHER SURGICAL HISTORY  09/23/2021    Neck surgery    OTHER SURGICAL HISTORY  09/23/2021    Transurethral resection of prostate    PROSTATE SURGERY      ROTATOR CUFF REPAIR  " "06/11/2013    Rotator Cuff Repair    US GUIDED ABSCESS DRAIN  02/10/2017    US GUIDED ABSCESS DRAIN 2/10/2017 [facility]ICAL LEGAC[facility]    US GUIDED PERCUTANEOUS PERITONEAL OR RETROPERITONEAL FLUID COLLECTION DRAINAGE  02/10/2017    US GUIDED PERCUTANEOUS PERITONEAL OR RETROPERITONEAL FLUID COLLECTION DRAINAGE 2/10/2017 [facility] CLINICAL LEGACY        Social History  He reports that he has never smoked. He has never been exposed to tobacco smoke. He has never used smokeless tobacco. He reports that he does not currently use alcohol. He reports that he does not use drugs.    Family History  Family History   Problem Relation Name Age of Onset    Other (heart trouble) Mother      Kidney disease Father      Heart disease Father      Other (hypoglycemia) Father      Other (heart trouble) Father          Allergies  Patient has no known allergies.    Review of Systems     Physical Exam     Last Recorded Vitals  Blood pressure 129/81, pulse 54, temperature 36.5 °C (97.7 °F), temperature source Temporal, resp. rate 16, height 1.88 m (6' 2.02\"), weight 105 kg (231 lb 0.7 oz), SpO2 96%.    Relevant Results             Assessment/Plan   Assessment & Plan  Prostate cancer (Multi)      biopsy       I spent  minutes in the professional and overall care of this patient.      Skyler Schmitt MD    "

## 2024-10-01 NOTE — PERIOPERATIVE NURSING NOTE
PT has done  well in recovery. Pt tolerates po well. No c/o pain or  ponv at this time. Pt ready for phase 2.

## 2024-10-07 ENCOUNTER — APPOINTMENT (OUTPATIENT)
Dept: NEUROLOGY | Facility: HOSPITAL | Age: 74
End: 2024-10-07
Payer: MEDICARE

## 2024-10-16 ENCOUNTER — APPOINTMENT (OUTPATIENT)
Dept: UROLOGY | Facility: CLINIC | Age: 74
End: 2024-10-16
Payer: MEDICARE

## 2024-10-16 VITALS — TEMPERATURE: 97.2 F | BODY MASS INDEX: 28.6 KG/M2 | WEIGHT: 230 LBS | HEIGHT: 75 IN

## 2024-10-16 DIAGNOSIS — C61 PROSTATE CANCER (MULTI): Primary | ICD-10-CM

## 2024-10-16 LAB
LAB AP BLOCK FOR ADDITIONAL STUDIES: NORMAL
LABORATORY COMMENT REPORT: NORMAL
PATH REPORT.FINAL DX SPEC: NORMAL
PATH REPORT.GROSS SPEC: NORMAL
PATH REPORT.RELEVANT HX SPEC: NORMAL
PATH REPORT.TOTAL CANCER: NORMAL

## 2024-10-16 PROCEDURE — 1159F MED LIST DOCD IN RCRD: CPT | Performed by: STUDENT IN AN ORGANIZED HEALTH CARE EDUCATION/TRAINING PROGRAM

## 2024-10-16 PROCEDURE — 3062F POS MACROALBUMINURIA REV: CPT | Performed by: STUDENT IN AN ORGANIZED HEALTH CARE EDUCATION/TRAINING PROGRAM

## 2024-10-16 PROCEDURE — 99214 OFFICE O/P EST MOD 30 MIN: CPT | Performed by: STUDENT IN AN ORGANIZED HEALTH CARE EDUCATION/TRAINING PROGRAM

## 2024-10-16 PROCEDURE — G2211 COMPLEX E/M VISIT ADD ON: HCPCS | Performed by: STUDENT IN AN ORGANIZED HEALTH CARE EDUCATION/TRAINING PROGRAM

## 2024-10-16 PROCEDURE — 1126F AMNT PAIN NOTED NONE PRSNT: CPT | Performed by: STUDENT IN AN ORGANIZED HEALTH CARE EDUCATION/TRAINING PROGRAM

## 2024-10-16 PROCEDURE — 3044F HG A1C LEVEL LT 7.0%: CPT | Performed by: STUDENT IN AN ORGANIZED HEALTH CARE EDUCATION/TRAINING PROGRAM

## 2024-10-16 PROCEDURE — 3048F LDL-C <100 MG/DL: CPT | Performed by: STUDENT IN AN ORGANIZED HEALTH CARE EDUCATION/TRAINING PROGRAM

## 2024-10-16 ASSESSMENT — PAIN SCALES - GENERAL: PAINLEVEL_OUTOF10: 0-NO PAIN

## 2024-10-16 NOTE — PROGRESS NOTES
"HPI:   Proc (9/12/23): TP prostate biopsy   Path: prostatic adenocarcinoma, GG1 ([place] 3+3=6), multi cores, high-grade PIN    Proc (10/1/24): TP prostate biopsy   Path: prostatic adenocarcinoma, GG1 ([place] score 3+3=6), 1/2 cores (20% of tissue)     Adebayo Puentes \"Ed\" is a 74 y.o. male referred by Dr. Garcia for elevated PSA. Hx of BPH w/ LUTS, CAD, CKD stage II, ED, HLD, HTN. MRI Prostate (8/8/23) showed a 0.7 cm PI-RADS 4 lesion within the anterior midline TZ towards the apex. S/p TP prostate biopsy (9/12/23) with pathology showing prostatic adenocarcinoma, GG1 (Kd 3+3=6), multi cores, high-grade PIN. MRI Prostate (9/23/24) showed 27g, stable findings including the subcentimeter PI-RADS 4 lesion in the anterior midline TZ near the apex, no evidence of extracapsular disease. S/p TP prostate biopsy (10/1/24) with pathology showing prostatic adenocarcinoma, GG1 (Kd score 3+3=6), 1/2 cores (20% of tissue). Doing well.      PSA: 4.82 (3/6/24), 3.82 (7/21/23)     MRI Prostate (8/8/23): 35.2g  a 0.7 cm PI-RADS 4 lesion within the anterior midline TZ towards the apex.     MRI Prostate (9/23/24): 27g, stable findings including the subcentimeter PI-RADS 4 lesion in the anterior midline TZ near the apex, no evidence of extracapsular disease.    Review of Systems:  All systems reviewed. Anything negative noted in the HPI.    Physical Exam:  Vitals signs reviewed.  Constitutional:      Appearance: Well-developed.  HENT:     Head: Normocephalic and atraumatic.  Neck:     Musculoskeletal: Normal range of motion.  Pulmonary:     Effort: Pulmonary effort is normal.  Musculoskeletal: Normal range of motion.  Skin:     General: Skin is warm and dry.  Neurological:     Mental Status: Alert and oriented to person, place, and time.  Psychiatric:        Behavior: Behavior normal.        Thought Content: Thought content normal.        Judgment: Judgment normal.    Procedures:    Assessment/Plan   Adebayo Puentes \"Ed\" " is a 74 y.o. male referred by Dr. Garcia for elevated PSA. Hx of BPH w/ LUTS, CAD, CKD stage II, ED, HLD, HTN. MRI Prostate (8/8/23) showed a 0.7 cm PI-RADS 4 lesion within the anterior midline TZ towards the apex. S/p TP prostate biopsy (9/12/23) with pathology showing prostatic adenocarcinoma, GG1 (Kd 3+3=6), multi cores, high-grade PIN. MRI Prostate (9/23/24) showed 27g, stable findings including the subcentimeter PI-RADS 4 lesion in the anterior midline TZ near the apex, no evidence of extracapsular disease. S/p TP prostate biopsy (10/1/24) with pathology showing prostatic adenocarcinoma, GG1 (Jeff[place] score 3+3=6), 1/2 cores (20% of tissue). Doing well. Management options including risks, benefits and alternatives discussed at length and all questions answered. Patient prefers to proceed with active surveillance, follow-up in 1 year with PSA.              Scribe Attestation  By signing my name below, IJennifer Scribe   attest that this documentation has been prepared under the direction and in the presence of Skylre Schmitt MD.

## 2024-10-17 ENCOUNTER — ANESTHESIA EVENT (OUTPATIENT)
Dept: GASTROENTEROLOGY | Facility: HOSPITAL | Age: 74
End: 2024-10-17
Payer: MEDICARE

## 2024-10-17 ENCOUNTER — HOSPITAL ENCOUNTER (OUTPATIENT)
Dept: GASTROENTEROLOGY | Facility: HOSPITAL | Age: 74
Setting detail: OUTPATIENT SURGERY
Discharge: HOME | End: 2024-10-17
Payer: MEDICARE

## 2024-10-17 ENCOUNTER — ANESTHESIA (OUTPATIENT)
Dept: GASTROENTEROLOGY | Facility: HOSPITAL | Age: 74
End: 2024-10-17
Payer: MEDICARE

## 2024-10-17 VITALS
OXYGEN SATURATION: 94 % | RESPIRATION RATE: 18 BRPM | TEMPERATURE: 97.9 F | SYSTOLIC BLOOD PRESSURE: 105 MMHG | WEIGHT: 230 LBS | BODY MASS INDEX: 28.6 KG/M2 | DIASTOLIC BLOOD PRESSURE: 64 MMHG | HEIGHT: 75 IN | HEART RATE: 50 BPM

## 2024-10-17 DIAGNOSIS — Q39.4 CRICOPHARYNGEAL WEB (HHS-HCC): ICD-10-CM

## 2024-10-17 DIAGNOSIS — J39.2 CRICOPHARYNGEAL HYPERTROPHY: ICD-10-CM

## 2024-10-17 DIAGNOSIS — R13.10 DYSPHAGIA, UNSPECIFIED TYPE: Primary | ICD-10-CM

## 2024-10-17 PROCEDURE — 3700000001 HC GENERAL ANESTHESIA TIME - INITIAL BASE CHARGE: Performed by: OTOLARYNGOLOGY

## 2024-10-17 PROCEDURE — 2500000004 HC RX 250 GENERAL PHARMACY W/ HCPCS (ALT 636 FOR OP/ED)

## 2024-10-17 PROCEDURE — 3700000002 HC GENERAL ANESTHESIA TIME - EACH INCREMENTAL 1 MINUTE: Performed by: OTOLARYNGOLOGY

## 2024-10-17 PROCEDURE — 43233 EGD BALLOON DIL ESOPH30 MM/>: CPT | Performed by: OTOLARYNGOLOGY

## 2024-10-17 PROCEDURE — 7100000009 HC PHASE TWO TIME - INITIAL BASE CHARGE: Performed by: OTOLARYNGOLOGY

## 2024-10-17 PROCEDURE — 2720000007 HC OR 272 NO HCPCS: Performed by: OTOLARYNGOLOGY

## 2024-10-17 PROCEDURE — 7100000010 HC PHASE TWO TIME - EACH INCREMENTAL 1 MINUTE: Performed by: OTOLARYNGOLOGY

## 2024-10-17 RX ORDER — PROPOFOL 10 MG/ML
INJECTION, EMULSION INTRAVENOUS AS NEEDED
Status: DISCONTINUED | OUTPATIENT
Start: 2024-10-17 | End: 2024-10-17

## 2024-10-17 RX ORDER — LIDOCAINE HYDROCHLORIDE 10 MG/ML
0.1 INJECTION, SOLUTION EPIDURAL; INFILTRATION; INTRACAUDAL; PERINEURAL ONCE
OUTPATIENT
Start: 2024-10-17 | End: 2024-10-17

## 2024-10-17 RX ORDER — NALOXONE HYDROCHLORIDE 0.4 MG/ML
0.2 INJECTION, SOLUTION INTRAMUSCULAR; INTRAVENOUS; SUBCUTANEOUS EVERY 5 MIN PRN
OUTPATIENT
Start: 2024-10-17

## 2024-10-17 RX ORDER — FLUMAZENIL 0.1 MG/ML
0.2 INJECTION INTRAVENOUS ONCE AS NEEDED
OUTPATIENT
Start: 2024-10-17

## 2024-10-17 RX ORDER — ONDANSETRON HYDROCHLORIDE 2 MG/ML
4 INJECTION, SOLUTION INTRAVENOUS ONCE AS NEEDED
OUTPATIENT
Start: 2024-10-17

## 2024-10-17 RX ORDER — FENTANYL CITRATE 50 UG/ML
INJECTION, SOLUTION INTRAMUSCULAR; INTRAVENOUS AS NEEDED
Status: DISCONTINUED | OUTPATIENT
Start: 2024-10-17 | End: 2024-10-17

## 2024-10-17 SDOH — HEALTH STABILITY: MENTAL HEALTH: CURRENT SMOKER: 0

## 2024-10-17 ASSESSMENT — COLUMBIA-SUICIDE SEVERITY RATING SCALE - C-SSRS: 1. IN THE PAST MONTH, HAVE YOU WISHED YOU WERE DEAD OR WISHED YOU COULD GO TO SLEEP AND NOT WAKE UP?: NO

## 2024-10-17 ASSESSMENT — PAIN - FUNCTIONAL ASSESSMENT
PAIN_FUNCTIONAL_ASSESSMENT: 0-10

## 2024-10-17 ASSESSMENT — PAIN SCALES - GENERAL
PAINLEVEL_OUTOF10: 0 - NO PAIN

## 2024-10-17 NOTE — ANESTHESIA PREPROCEDURE EVALUATION
"Patient: Adebayo Puentes \"Ed\"    Procedure Information       Date/Time: 10/17/24 1200    Scheduled providers: Virginia Delgado MD; Montana Moreno MD    Procedure: EGD    Location: [facility]            Relevant Problems   Cardiac   (+) Atherosclerotic heart disease of native coronary artery without angina pectoris   (+) CAD (coronary artery disease)   (+) Essential (primary) hypertension   (+) Hyperlipidemia   (+) Hypertension   (+) Mixed hyperlipidemia      Neuro   (+) Depression   (+) Idiopathic peripheral neuropathy   (+) Major depressive disorder, single episode, unspecified   (+) Recurrent major depressive disorder (CMS-HCC)   (+) Right trigeminal neuralgia   (+) Trigeminus neuralgia      GI   (+) Dysphagia   (+) GERD (gastroesophageal reflux disease)      /Renal   (+) BPH (benign prostatic hyperplasia)   (+) BPH with obstruction/lower urinary tract symptoms   (+) Nephrolithiasis   (+) Prostate cancer (Multi)      Endocrine   (+) 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)      HEENT   (+) Bilateral sensorineural hearing loss   (+) Seasonal allergies       Clinical information reviewed:   Tobacco  Allergies  Meds   Med Hx  Surg Hx   Fam Hx  Soc Hx        NPO Detail:  NPO/Void Status  Date of Last Liquid: 10/16/24  Time of Last Liquid: 2100  Date of Last Solid: 10/16/24  Time of Last Solid: 2100  Last Intake Type: Clear fluids; Food  Time of Last Void: 1055         Physical Exam    Airway  Mallampati: II  TM distance: >3 FB  Neck ROM: full     Cardiovascular - normal exam     Dental - normal exam     Pulmonary    Abdominal            Anesthesia Plan    History of general anesthesia?: yes  History of complications of general anesthesia?: no    ASA 3     MAC     The patient is not a current smoker.    intravenous induction   Anesthetic plan and risks discussed with patient.      "

## 2024-10-17 NOTE — DISCHARGE INSTRUCTIONS
Postoperative Instructions    General: Pain of the nose and throat can be normal after these procedures. A small amount of blood from the nose or mouth can be expected.  Diet: Start with liquids after anesthesia. Soft foods may feel best for the first 2-3 days following your procedure. Soft foods include soup, noodles, scrambled eggs, oatmeal, yogurt, smoothies, applesauce, mashed potatoes, pasta or ice cream. Avoid toast, chips, hard crusted breads, and steak or similar meats. After your throat begins to feel less sore, you can continue your normal diet.   Pain Control: You may experience a mild to moderate sore throat or tongue for several days following the procedure. The throat pain may also seem to cause earaches from referred pain. We encourage use of acetaminophen (Tylenol) and /or ibuprofen (Motrin/Advil) for most pain control. These two medications can be taken together or can be alternated. We will sometimes prescribe you something stronger for pain for “break through.”  Use it only as needed. Do not drive while taking any narcotic pain medications.  Follow-up: Your follow-up with your doctor should be scheduled 2-3 weeks following surgery. If a biopsy was preformed, results will usually be available in the system 7 days following the surgery. You will be informed of the results.   Please call the office or go to the emergency room if you experience any of the following: difficulty breathing, inability to swallow, severe chest pain, fever great than 101 degrees, significant bleeding, or any new/concerning symptoms.  Contact:  During office hours between 8 AM and 5 PM, please call Dr. Delgado's office at [phone].  If you have an emergency over night or on the weekend, please call [phone] and ask the  to connect you to the ENT resident on call.

## 2024-10-17 NOTE — ANESTHESIA POSTPROCEDURE EVALUATION
"Patient: Adebayo Puentes \"Ed\"    Procedure Summary       Date: 10/17/24 Room / Location: [facility]    Anesthesia Start: 1145 Anesthesia Stop: 1219    Procedure: EGD Diagnosis:       Dysphagia, unspecified type      Cricopharyngeal hypertrophy      Cricopharyngeal web (HHS-HCC)      Dysphagia, unspecified type    Scheduled Providers: Virginia Delgado MD; Montana Moreno MD Responsible Provider: Montana Moreno MD    Anesthesia Type: MAC ASA Status: 3            Anesthesia Type: MAC    Vitals Value Taken Time   /73 10/17/24 1232   Temp  10/17/24 1239   Pulse 48 10/17/24 1232   Resp 18 10/17/24 1232   SpO2 92 % 10/17/24 1232       Anesthesia Post Evaluation    Patient location during evaluation: PACU  Patient participation: complete - patient participated  Level of consciousness: awake and alert  Pain management: adequate  Airway patency: patent  Cardiovascular status: acceptable  Respiratory status: acceptable  Hydration status: acceptable  Postoperative Nausea and Vomiting: none        There were no known notable events for this encounter.    "

## 2024-10-18 ENCOUNTER — APPOINTMENT (OUTPATIENT)
Dept: SPEECH THERAPY | Facility: CLINIC | Age: 74
End: 2024-10-18
Payer: COMMERCIAL

## 2024-10-18 ASSESSMENT — PAIN SCALES - GENERAL: PAINLEVEL_OUTOF10: 0 - NO PAIN

## 2024-10-21 ENCOUNTER — OFFICE VISIT (OUTPATIENT)
Dept: NEUROLOGY | Facility: HOSPITAL | Age: 74
End: 2024-10-21
Payer: MEDICARE

## 2024-10-21 VITALS
HEART RATE: 63 BPM | WEIGHT: 230 LBS | BODY MASS INDEX: 28.6 KG/M2 | DIASTOLIC BLOOD PRESSURE: 64 MMHG | HEIGHT: 75 IN | SYSTOLIC BLOOD PRESSURE: 113 MMHG

## 2024-10-21 DIAGNOSIS — G50.0 TRIGEMINAL NEURALGIA: Primary | ICD-10-CM

## 2024-10-21 PROCEDURE — 3074F SYST BP LT 130 MM HG: CPT | Performed by: PSYCHIATRY & NEUROLOGY

## 2024-10-21 PROCEDURE — 3044F HG A1C LEVEL LT 7.0%: CPT | Performed by: PSYCHIATRY & NEUROLOGY

## 2024-10-21 PROCEDURE — 3048F LDL-C <100 MG/DL: CPT | Performed by: PSYCHIATRY & NEUROLOGY

## 2024-10-21 PROCEDURE — 99213 OFFICE O/P EST LOW 20 MIN: CPT | Performed by: PSYCHIATRY & NEUROLOGY

## 2024-10-21 PROCEDURE — 1159F MED LIST DOCD IN RCRD: CPT | Performed by: PSYCHIATRY & NEUROLOGY

## 2024-10-21 PROCEDURE — 3062F POS MACROALBUMINURIA REV: CPT | Performed by: PSYCHIATRY & NEUROLOGY

## 2024-10-21 PROCEDURE — 3078F DIAST BP <80 MM HG: CPT | Performed by: PSYCHIATRY & NEUROLOGY

## 2024-10-21 NOTE — PROGRESS NOTES
Neuromuscular Medicine Follow Up     Adebayo Puentes, MRN: [id number], : 1950  Reason for Visit: No chief complaint on file.     Primary Care Physician: Mayda Carter MD     Impression/Plan:   Adebayo Puentes I here for follow-up of trigeminal neuralgia, on carbamazepine low dose daily 100 mg. Will take an additional tablet if he has a flare-up.    He has been seeing ENT for history of hypophonia, received injections, overall this symptom has improved.    Plan:  -- continue carbamazepine 100 mg daily for trigeminal neuralgia  -- follow-up in Spring 2025      Lyssa Lo MD  Neuromuscular Neurology  [facility]  Office Phone Number: [phone]        History of Present Illness:    Mr. Puentes is a 74 y.o. with history of trigeminal neuralgia, on carbazempine.    Symptoms are relatively stable. Has had minor exacerbations.When he has exacerbations, he will take an extra carbamazepine medication.    Since last visit, has had evaluation with ENT regarding intermittent voice hypophonia, seeing ENT physician Dr. Virginia Delgado.     On carbamazepine 100 mg daily.    Prior History:     Relevant past medical, surgical, family, and social histories, along with ROS was reviewed and pertinent details noted above.     No Known Allergies   Medications:    Current Outpatient Medications:     amLODIPine (Norvasc) 10 mg tablet, Take 1 tablet (10 mg) by mouth once daily., Disp: 90 tablet, Rfl: 3    ascorbic acid, vitamin C, 1,000 mg ER tablet, Take by mouth., Disp: , Rfl:     aspirin 81 mg EC tablet, Take 1 tablet (81 mg) by mouth once daily., Disp: , Rfl:     atenolol (Tenormin) 25 mg tablet, Take 1 tablet (25 mg) by mouth once daily., Disp: 90 tablet, Rfl: 3    atorvastatin (Lipitor) 40 mg tablet, Take 1 tablet (40 mg) by mouth once daily., Disp: 90 tablet, Rfl: 3    calcium carbonate-vitamin D3 (Caltrate with Vitamin D3) 600 mg-20 mcg (800 unit) tablet, Take by mouth twice a day., Disp: , Rfl:     carBAMazepine (TEGretol)  "100 mg chewable tablet, Chew 1 tablet (100 mg) early in the morning.., Disp: 90 tablet, Rfl: 3    cholecalciferol (Vitamin D3) 5,000 Units tablet, Take 1 tablet (5,000 Units) by mouth 1 (one) time per week., Disp: , Rfl:     escitalopram (Lexapro) 20 mg tablet, Take 1 tablet (20 mg) by mouth once daily., Disp: 90 tablet, Rfl: 3    esomeprazole (NexIUM) 20 mg DR capsule, Take 1 capsule (20 mg) by mouth once daily. Do not open capsule., Disp: , Rfl:     losartan-hydrochlorothiazide (Hyzaar) 100-25 mg tablet, Take 1 tablet by mouth once daily., Disp: 30 tablet, Rfl: 1    montelukast (Singulair) 10 mg tablet, Take 1 tablet (10 mg) by mouth once daily., Disp: 90 tablet, Rfl: 3    multivitamin with minerals iron-free (Centrum Silver), Take 1 tablet by mouth., Disp: , Rfl:     mv-min-folic acid-lutein (Centrum Silver) 400-250 mcg tablet,chewable, Chew 1 tablet once daily., Disp: , Rfl:     Myrbetriq 50 mg tablet extended release 24 hr 24 hr tablet, Take by mouth., Disp: , Rfl:     traZODone (Desyrel) 100 mg tablet, Take 1 tablet (100 mg) by mouth once daily at bedtime., Disp: 90 tablet, Rfl: 3       Physical Exam:   /64   Pulse 63   Ht 1.905 m (6' 3\")   Wt 104 kg (230 lb)   BMI 28.75 kg/m²      Exam:     Has triggered finger contracture of digit 4 on the right and little finger on the left hand.     Alert and awake     Strength strong in arms and legs 5 proximally and distally     Face symmetric, smile symmetric     Sensation intact to light touch in face, arms and legs    Results:     The following labs, imaging, and/or data were personally reviewed and demonstrated:  "

## 2024-10-23 LAB
LABORATORY COMMENT REPORT: NORMAL
PATH REPORT.FINAL DX SPEC: NORMAL
PATH REPORT.GROSS SPEC: NORMAL
PATH REPORT.TOTAL CANCER: NORMAL

## 2024-10-24 DIAGNOSIS — R39.15 URINARY URGENCY: ICD-10-CM

## 2024-10-25 RX ORDER — MIRABEGRON 50 MG/1
50 TABLET, FILM COATED, EXTENDED RELEASE ORAL DAILY
Qty: 30 TABLET | Refills: 11 | Status: SHIPPED | OUTPATIENT
Start: 2024-10-25 | End: 2025-10-25

## 2024-11-05 ENCOUNTER — OFFICE VISIT (OUTPATIENT)
Dept: OTOLARYNGOLOGY | Facility: CLINIC | Age: 74
End: 2024-11-05
Payer: MEDICARE

## 2024-11-05 VITALS — HEIGHT: 75 IN | TEMPERATURE: 97.3 F | BODY MASS INDEX: 28.76 KG/M2 | WEIGHT: 231.3 LBS

## 2024-11-05 DIAGNOSIS — K22.70 BARRETT'S ESOPHAGUS DETERMINED BY BIOPSY: ICD-10-CM

## 2024-11-05 DIAGNOSIS — R13.10 DYSPHAGIA, UNSPECIFIED TYPE: Primary | ICD-10-CM

## 2024-11-05 DIAGNOSIS — K21.00 GASTROESOPHAGEAL REFLUX DISEASE WITH ESOPHAGITIS WITHOUT HEMORRHAGE: ICD-10-CM

## 2024-11-05 PROCEDURE — 3044F HG A1C LEVEL LT 7.0%: CPT | Performed by: OTOLARYNGOLOGY

## 2024-11-05 PROCEDURE — 99214 OFFICE O/P EST MOD 30 MIN: CPT | Performed by: OTOLARYNGOLOGY

## 2024-11-05 PROCEDURE — 1159F MED LIST DOCD IN RCRD: CPT | Performed by: OTOLARYNGOLOGY

## 2024-11-05 PROCEDURE — 31579 LARYNGOSCOPY TELESCOPIC: CPT | Performed by: OTOLARYNGOLOGY

## 2024-11-05 PROCEDURE — 1126F AMNT PAIN NOTED NONE PRSNT: CPT | Performed by: OTOLARYNGOLOGY

## 2024-11-05 PROCEDURE — 3062F POS MACROALBUMINURIA REV: CPT | Performed by: OTOLARYNGOLOGY

## 2024-11-05 PROCEDURE — 1160F RVW MEDS BY RX/DR IN RCRD: CPT | Performed by: OTOLARYNGOLOGY

## 2024-11-05 PROCEDURE — 3048F LDL-C <100 MG/DL: CPT | Performed by: OTOLARYNGOLOGY

## 2024-11-05 ASSESSMENT — PAIN SCALES - GENERAL: PAINLEVEL_OUTOF10: 0-NO PAIN

## 2024-11-05 NOTE — PROGRESS NOTES
"Patient: Adebayo Puentes   MRN: [id number] YOB: 1950   Sex: male Age: 74 y.o.  Date of Service: 2024       ASSESSMENT AND PLAN  I discussed the findings with Adebayo Puentes \"Ed\" and have recommended the followin. Dysphagia to solids > liquids, history of GERD s/p Nissen fundoplication in  s/p esophagoscopy and dilation to 32mm on 10/17/24, biopsies revealed metaplasia    - Repeat esophagoscopy and dilation as needed  - Continue PPI  - Refer to GI for Carrillo's follow-up    2. Dysphonia, vocal fold atrophy, improving, therefore injection deferred  - Continue to monitor  - Consider vocal fold injection as needed      CHIEF COMPLAINT  Chief Complaint   Patient presents with    Dysphagia       HISTORY OF PRESENT ILLNESS  Adebayo Puentes \"Ed\" is a 74 y.o. male referred by Virginia Delgado MD for evaluation of dysphagia and dysphonia.  He has a history of GERD s/p Nissen fundoplication in . The patient reports his voice gets weak and will occasional go out. Has been going on for several years. He also notes issues with food sticking in his throat, also over the past several years.     24  S/p esophagoscopy and dilation to 32mm on 10/17/24, biopsies revealed metaplasia. He is on PPI twice a day. Swallowing is improved overall, still has issues with phlegm. He reports the voice is overall better as well    24  He saw [facility] for therapy and notes some improvement in his voice. It is more steady.    The dysphagia history includes:      Dysphagia for solids               yes    Dysphagia for liquids              occasional    Dysphagia for pills                  no  Associated weight loss            no    Recent pneumonia/bronchitis  no  GERD/Acid reflux   Prior Nissen on Nexxium as needed, very rare    ADDITIONAL HISTORY  Past Medical History  He has a past medical history of Abnormal findings on diagnostic imaging of other parts of musculoskeletal system (2016), BPH (benign prostatic " hyperplasia), CKD (chronic kidney disease), Cutaneous abscess of neck (09/23/2021), Cyst of kidney, acquired (12/01/2015), Depression, GERD (gastroesophageal reflux disease), Hypertension, Old myocardial infarction, Old myocardial infarction (06/06/2013), Personal history of other diseases of the circulatory system, Personal history of other diseases of the circulatory system, Personal history of other diseases of the digestive system, Personal history of other diseases of the digestive system, Personal history of other diseases of urinary system, Personal history of other mental and behavioral disorders, Personal history of other specified conditions (09/02/2016), Personal history of other specified conditions, Personal history of other specified conditions, Prostate cancer (Multi), and Trigeminal neuralgia.    He has no past medical history of Asthma, Awareness under anesthesia, CHF (congestive heart failure), COPD (chronic obstructive pulmonary disease) (Multi), Delayed emergence from general anesthesia, Diabetes mellitus type I (Multi), Hard to intubate, Irregular heart beat, Malignant hyperthermia, PONV (postoperative nausea and vomiting), Refusal of blood product, Sleep apnea, or Type 2 diabetes mellitus. Surgical History  He has a past surgical history that includes Other surgical history (06/11/2013); Rotator cuff repair (06/11/2013); Other surgical history (09/23/2021); Other surgical history (09/23/2021); Gastric fundoplication (02/25/2015); US guided percutaneous peritoneal or retroperitoneal fluid collection drainage (02/10/2017); US guided abscess drain (02/10/2017); and Prostate surgery.   Social History  He reports that he has never smoked. He has never been exposed to tobacco smoke. He has never used smokeless tobacco. He reports that he does not currently use alcohol. He reports that he does not use drugs. Allergies  Patient has no known allergies.     Family History  Family History   Problem  "Relation Name Age of Onset    Other (heart trouble) Mother      Kidney disease Father      Heart disease Father      Other (hypoglycemia) Father      Other (heart trouble) Father          REVIEW OF SYSTEMS  All 10 systems were reviewed and negative except for above.      PHYSICAL EXAM  ENT Physical Exam   GENERAL: Well-nourished and developed, alert and appropriate, no distress, voice [id number]  RESPIRATORY: Breathing quietly, no stridor  HEAD: Normocephalic atraumatic  FACE: Symmetric, no masses or lesions  EYES:  Pupils reactive, sclera clear, external ocular muscles intact, no nystagmus.    EARS:  Pinnae normal. External auditory canals clear and tympanic membranes intact.  NOSE:  No anterior lesions, masses or polyps.  ORAL CAVITY/OROPHARYNX:  Buccal mucosa is moist without lesions or masses, tongue midline and palate elevates symmetrically. Tongue mobility intact.  NECK:  Soft. There is no lymphadenopathy or thyromegaly.  Redundant submental skin but palpable landmarks  NEUROLOGIC:  Cranial nerves II-XII grossly intact.       Last Recorded Vitals  Temperature 36.3 °C (97.3 °F), height 1.905 m (6' 3\"), weight 105 kg (231 lb 4.8 oz).    RESULTS    Patient Reported Outcome Measures  N/A    Laboratory, Radiology, and Pathology  I personally reviewed the following results, with the following interpretation:   Path 10/17/24      Component    FINAL DIAGNOSIS   A.  GASTROESOPHAGEAL JUNCTION, BIOPSY:  - SQUAMOCOLUMNAR JUNCTIONAL MUCOSA DEMONSTRATING CHRONIC CARDITIS WITH INTESTINAL METAPLASIA, NO DYSPLASIA IDENTIFIED.     B.  STOMACH, BIOPSY:  - UNREMARKABLE GASTRIC MUCOSA, NO HELICOBACTER IDENTIFIED.        MBS 8/20/24 - CP bar/web, limited eso follow-through        PROCEDURES  Flexible Fiberoptic Laryngoscopy with Stroboscopy    Patient failed a mirror exam due to limitations of equipment and the need for stroboscopy to assess glottic vibration and closure.     PREOPERATIVE DIAGNOSIS: Dysphonia    POSTOPERATIVE " DIAGNOSIS: Same    PROCEDURE:  Strobovideolaryngoscopy    ANESTHESIA:  Topical    COMPLICATIONS:  None    SPECIMENS:  None    PROCEDURE IN DETAIL: The patient was seated in an upright position.  The nasal cavity was topically decongested and anesthetized.  The stroboscopic microphone was held to the neck at the level of the larynx.  The distal chip video laryngoscope was passed through the nasal cavity.  The nasal cavity and nasopharynx were within normal limits except noted below.  The following findings on stroboscopy were noted:      Tongue Base: no masses or lesions   Vocal Fold Mobility               Right VF: mobile               Left VF: mobile   TVF Appearance               Edema/Erythema: none               Lesions/vibratory margin irregularities: atrophy   Glottic Closure Pattern: complete    Vibration:               Phase: symmetric    Periodicity: regular                Amplitude: increased                Waveform: normal     Muscle Tension Patterns:  lateral   Other Findings: pooled secretions throughout pharynx    The patient tolerated the procedure well.       ----------------------------------------------------------------------  Virginia Delgado MD, MAEd    [facility]  Department of Otolaryngology - Head and Neck Surgery  [facility]    The total time I spent in care of this patient today (excluding time spent on other billable services) is as follows:    Time Spent  Prep time on day of patient encounter: 10 minutes  Time spent directly with patient, family or caregiver: 15 minutes  Additional Time Spent on Patient Care Activities: 5 minutes  Documentation Time: 5 minutes  Other Time Spent: 0 minutes  Total: 35 minutes

## 2024-11-12 NOTE — PROGRESS NOTES
"Nephrology Outpatient Follow-up Patient Note    Chief Concern:  Follow-up for CKD    History Of Present Illness   Adebayo Puentes \"Ed\" is a 74 y.o. male with a history of  CKD, CAD, HTN, HLP, BPH s/p TURP on 2020, recent echo normal and prostate Ca on surveillance     Last labs from 9/24: Scr stable 1.5, eGFR 65 with BSA, PCR 0.1, ACR <12, rest OK  On losartan+hydrochlorothiazide, amlodipine and losartan  He fells OK, no edema, no SOB      Past Medical History  He has a past medical history of Abnormal findings on diagnostic imaging of other parts of musculoskeletal system (09/21/2016), BPH (benign prostatic hyperplasia), CKD (chronic kidney disease), Cutaneous abscess of neck (09/23/2021), Cyst of kidney, acquired (12/01/2015), Depression, GERD (gastroesophageal reflux disease), Hypertension, Old myocardial infarction, Old myocardial infarction (06/06/2013), Personal history of other diseases of the circulatory system, Personal history of other diseases of the circulatory system, Personal history of other diseases of the digestive system, Personal history of other diseases of the digestive system, Personal history of other diseases of urinary system, Personal history of other mental and behavioral disorders, Personal history of other specified conditions (09/02/2016), Personal history of other specified conditions, Personal history of other specified conditions, Prostate cancer (Multi), and Trigeminal neuralgia.    He has no past medical history of Asthma, Awareness under anesthesia, CHF (congestive heart failure), COPD (chronic obstructive pulmonary disease) (Multi), Delayed emergence from general anesthesia, Diabetes mellitus type I (Multi), Hard to intubate, Irregular heart beat, Malignant hyperthermia, PONV (postoperative nausea and vomiting), Refusal of blood product, Sleep apnea, or Type 2 diabetes mellitus.  Patient Active Problem List   Diagnosis    BPH with obstruction/lower urinary tract symptoms    BPH " (benign prostatic hyperplasia)    CAD (coronary artery disease)    Elevated PSA    Fracture of unspecified part of neck of right femur, subsequent encounter for closed fracture with routine healing    Headache    Hyperlipidemia    Hypertension    Hypercalcemia    Impaired fasting blood sugar    Mild vitamin D deficiency    Recurrent major depressive disorder ([facility])    S/P rotator cuff repair    Seasonal allergies    Ventral hernia without obstruction or gangrene    Rising PSA level    Abnormal urinary stream    Urinary urgency    Acetabular fracture (Multi)    Acetabulum fracture, right    Bilateral sensorineural hearing loss    Chronic throat clearing    Persistent cough    Complete tear of right rotator cuff    Depression    Difficulty in walking, not elsewhere classified    Dysphagia    Elevated serum creatinine    Erectile dysfunction    GERD (gastroesophageal reflux disease)    Globus sensation    Hemorrhage of ear canal    Hemorrhoids    Hypertrophy of inferior nasal turbinate    Idiopathic peripheral neuropathy    Imbalance    Laryngopharyngeal reflux (LPR)    Muscle weakness (generalized)    Neck mass    Nephrolithiasis    Renal cyst    Otalgia, right    Prostate cancer (Multi)    Recurrent shoulder dislocation, right    Right trigeminal neuralgia    Trigeminus neuralgia    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)    Atherosclerotic heart disease of native coronary artery without angina pectoris    Mixed hyperlipidemia    Essential (primary) hypertension    Major depressive disorder, single episode, unspecified    BMI 29.0-29.9,adult    Bilateral vocal cord paresis    Glottic insufficiency    Muscle tension dysphonia       Surgical History  He has a past surgical history that includes Other surgical history (06/11/2013); Rotator cuff repair (06/11/2013); Other surgical history (09/23/2021); Other surgical history (09/23/2021); Gastric fundoplication (02/25/2015); US guided percutaneous  "peritoneal or retroperitoneal fluid collection drainage (02/10/2017); US guided abscess drain (02/10/2017); and Prostate surgery.     Social History  He reports that he has never smoked. He has never been exposed to tobacco smoke. He has never used smokeless tobacco. He reports that he does not currently use alcohol. He reports that he does not use drugs.    Family History  Family History   Problem Relation Name Age of Onset    Other (heart trouble) Mother      Kidney disease Father      Heart disease Father      Other (hypoglycemia) Father      Other (heart trouble) Father          Allergies  Patient has no known allergies.    Review of Systems  A 12-point review of systems was performed and noted be negative except for that which was mentioned in the history of present illness     Last Recorded Vitals  /70 (BP Location: Right arm, Patient Position: Sitting, BP Cuff Size: Adult)   Pulse 79   Temp 36.6 °C (97.9 °F) (Oral)   Ht 1.905 m (6' 3\")   Wt 104 kg (228 lb 6.4 oz)   BMI 28.55 kg/m²      Physical Exam:  Constitutional:  No acute distress  Respiration:  Breathing comfortably, no stridor  Cardiovascular:  No clubbing/cyanosis/edema in hands  Eyes:  EOM intact, sclera normal  Neuro:  Alert and oriented times 3, Cranial nerves II-XII grossly intact and symmetric bilaterally. No asterixis  Head and Face:  Symmetric facial features, no masses or lesions, sinuses non-tender to palpation  Neck/Lymph:  No LAD, no thyroid masses, trachea midline, No JVD  Skin:  no visible rash or scratching marks   Psych:  Alert and oriented with appropriate mood and affect      Medications:  Medication Documentation Review Audit       Reviewed by Virginia Delgado MD (Physician) on 11/05/24 at 1407      Medication Order Taking? Sig Documenting Provider Last Dose Status   amLODIPine (Norvasc) 10 mg tablet [id number] No Take 1 tablet (10 mg) by mouth once daily. Mayda Carter MD 10/16/2024  9:00 PM Active   ascorbic acid, vitamin C, " 1,000 mg ER tablet [id number] No Take by mouth. Albino Singh MD  9:00 PM Active   aspirin 81 mg EC tablet [id number] No Take 1 tablet (81 mg) by mouth once daily. Albino Singh MD 10/16/2024  8:00 AM Active   atenolol (Tenormin) 25 mg tablet [id number] No Take 1 tablet (25 mg) by mouth once daily. Mayda Carter MD 10/16/2024  9:00 PM Active   atorvastatin (Lipitor) 40 mg tablet [id number] No Take 1 tablet (40 mg) by mouth once daily. Mayda Carter MD  8:00 AM Active   calcium carbonate-vitamin D3 (Caltrate with Vitamin D3) 600 mg-20 mcg (800 unit) tablet [id number] No Take by mouth twice a day. Albino Singh MD 10/16/2024  9:00 PM Active   carBAMazepine (TEGretol) 100 mg chewable tablet [id number] No Chew 1 tablet (100 mg) early in the morning.. Mayda Carter MD 10/16/2024  8:00 AM Active   cholecalciferol (Vitamin D3) 5,000 Units tablet [id number] No Take 1 tablet (5,000 Units) by mouth 1 (one) time per week. Albino Singh MD 10/16/2024  9:00 PM Active   escitalopram (Lexapro) 20 mg tablet [id number] No Take 1 tablet (20 mg) by mouth once daily. Mayda Carter MD  9:00 PM Active   esomeprazole (NexIUM) 20 mg DR capsule [id number] No Take 1 capsule (20 mg) by mouth once daily. Do not open capsule. Albino Singh MD More than a month Active   losartan-hydrochlorothiazide (Hyzaar) 100-25 mg tablet [id number] No Take 1 tablet by mouth once daily. Mayda Carter MD 10/16/2024  8:00 AM  10/17/24 2359   montelukast (Singulair) 10 mg tablet [id number] No Take 1 tablet (10 mg) by mouth once daily. Mayda Carter MD  8:00 AM Active   multivitamin with minerals iron-free (Centrum Silver) [id number] No Take 1 tablet by mouth. Albino Singh MD 10/16/2024  9:00 PM Active   mv-min-folic acid-lutein (Centrum Silver) 400-250 mcg tablet,chewable [id number] No Chew 1 tablet once daily. Historical Provider, MD 10/16/2024  9:00 PM Active   Myrbetriq 50 mg tablet extended release 24 hr 24 hr tablet  "[id number]  Take 1 tablet (50 mg) by mouth once daily. Yadira Fuentes MD  Active   traZODone (Desyrel) 100 mg tablet [id number] No Take 1 tablet (100 mg) by mouth once daily at bedtime. Mayda Carter MD  9:00 PM Active                    Relevant Results Recent labs reviewed in the EMR.  Lab Results   Component Value Date    HGB 13.9 09/05/2024    HGB 14.2 03/06/2024    HGB 14.2 09/08/2023    HGB 14.6 08/11/2023    HGB 14.1 02/24/2023    MCV 95 09/05/2024    MCV 96 03/06/2024    MCV 95.1 09/08/2023    MCV 95 08/11/2023    MCV 92 02/24/2023     09/05/2024     03/06/2024     09/08/2023     08/11/2023     02/24/2023       No results found for: \"FERRITIN\", \"IRON\"    Lab Results   Component Value Date     09/05/2024    K 3.8 09/05/2024     09/05/2024    BUN 28 (H) 09/05/2024    CREATININE 1.50 (H) 09/05/2024       Imaging:  I personally reviewed then and this is my impression:        Assessment/Plan   1. Stage 3a chronic kidney disease (Multi) (Primary)  - stable non proteinuric CKD for many years, on RASi, excellent prognosis, will RTO in 6 months with our NP  - Renal function panel; Future  - CBC and Auto Differential; Future  - Follow Up In Nephrology; Future    2. Essential hypertension  - controlled  - Renal function panel; Future  - CBC and Auto Differential; Future  - Follow Up In Nephrology; Future     RTO 6 months, labs before   Cayetano Yang MD  Nephrology and Hypertension  "

## 2024-11-13 ENCOUNTER — APPOINTMENT (OUTPATIENT)
Dept: NEPHROLOGY | Facility: CLINIC | Age: 74
End: 2024-11-13
Payer: MEDICARE

## 2024-11-13 VITALS
TEMPERATURE: 97.9 F | DIASTOLIC BLOOD PRESSURE: 70 MMHG | WEIGHT: 228.4 LBS | HEART RATE: 79 BPM | HEIGHT: 75 IN | BODY MASS INDEX: 28.4 KG/M2 | SYSTOLIC BLOOD PRESSURE: 108 MMHG

## 2024-11-13 DIAGNOSIS — N18.31 STAGE 3A CHRONIC KIDNEY DISEASE (MULTI): Primary | ICD-10-CM

## 2024-11-13 DIAGNOSIS — I10 ESSENTIAL HYPERTENSION: ICD-10-CM

## 2024-11-13 PROCEDURE — 3062F POS MACROALBUMINURIA REV: CPT | Performed by: INTERNAL MEDICINE

## 2024-11-13 PROCEDURE — 3044F HG A1C LEVEL LT 7.0%: CPT | Performed by: INTERNAL MEDICINE

## 2024-11-13 PROCEDURE — 3078F DIAST BP <80 MM HG: CPT | Performed by: INTERNAL MEDICINE

## 2024-11-13 PROCEDURE — 1159F MED LIST DOCD IN RCRD: CPT | Performed by: INTERNAL MEDICINE

## 2024-11-13 PROCEDURE — 1036F TOBACCO NON-USER: CPT | Performed by: INTERNAL MEDICINE

## 2024-11-13 PROCEDURE — 3074F SYST BP LT 130 MM HG: CPT | Performed by: INTERNAL MEDICINE

## 2024-11-13 PROCEDURE — 99214 OFFICE O/P EST MOD 30 MIN: CPT | Performed by: INTERNAL MEDICINE

## 2024-11-13 PROCEDURE — 3048F LDL-C <100 MG/DL: CPT | Performed by: INTERNAL MEDICINE

## 2024-11-18 ENCOUNTER — APPOINTMENT (OUTPATIENT)
Dept: OTOLARYNGOLOGY | Facility: HOSPITAL | Age: 74
End: 2024-11-18
Payer: MEDICARE

## 2024-12-06 ENCOUNTER — LAB (OUTPATIENT)
Dept: LAB | Facility: LAB | Age: 74
End: 2024-12-06
Payer: MEDICARE

## 2024-12-06 ENCOUNTER — APPOINTMENT (OUTPATIENT)
Dept: PRIMARY CARE | Facility: CLINIC | Age: 74
End: 2024-12-06
Payer: MEDICARE

## 2024-12-06 VITALS
HEIGHT: 72 IN | DIASTOLIC BLOOD PRESSURE: 86 MMHG | SYSTOLIC BLOOD PRESSURE: 124 MMHG | WEIGHT: 230.4 LBS | BODY MASS INDEX: 31.21 KG/M2

## 2024-12-06 DIAGNOSIS — R97.20 ELEVATED PSA: ICD-10-CM

## 2024-12-06 DIAGNOSIS — K21.9 GASTROESOPHAGEAL REFLUX DISEASE, UNSPECIFIED WHETHER ESOPHAGITIS PRESENT: ICD-10-CM

## 2024-12-06 DIAGNOSIS — E78.5 HYPERLIPIDEMIA, UNSPECIFIED HYPERLIPIDEMIA TYPE: ICD-10-CM

## 2024-12-06 DIAGNOSIS — I10 HYPERTENSION, UNSPECIFIED TYPE: ICD-10-CM

## 2024-12-06 DIAGNOSIS — K22.70 BARRETT'S ESOPHAGUS WITHOUT DYSPLASIA: ICD-10-CM

## 2024-12-06 DIAGNOSIS — C61 PROSTATE CANCER (MULTI): ICD-10-CM

## 2024-12-06 DIAGNOSIS — R73.01 IMPAIRED FASTING BLOOD SUGAR: Primary | ICD-10-CM

## 2024-12-06 DIAGNOSIS — R73.01 IMPAIRED FASTING BLOOD SUGAR: ICD-10-CM

## 2024-12-06 LAB
EST. AVERAGE GLUCOSE BLD GHB EST-MCNC: 117 MG/DL
HBA1C MFR BLD: 5.7 %
PSA SERPL-MCNC: 8.38 NG/ML

## 2024-12-06 PROCEDURE — 3079F DIAST BP 80-89 MM HG: CPT | Performed by: INTERNAL MEDICINE

## 2024-12-06 PROCEDURE — 1124F ACP DISCUSS-NO DSCNMKR DOCD: CPT | Performed by: INTERNAL MEDICINE

## 2024-12-06 PROCEDURE — 1159F MED LIST DOCD IN RCRD: CPT | Performed by: INTERNAL MEDICINE

## 2024-12-06 PROCEDURE — 83036 HEMOGLOBIN GLYCOSYLATED A1C: CPT

## 2024-12-06 PROCEDURE — 36415 COLL VENOUS BLD VENIPUNCTURE: CPT

## 2024-12-06 PROCEDURE — 3048F LDL-C <100 MG/DL: CPT | Performed by: INTERNAL MEDICINE

## 2024-12-06 PROCEDURE — 3062F POS MACROALBUMINURIA REV: CPT | Performed by: INTERNAL MEDICINE

## 2024-12-06 PROCEDURE — 99214 OFFICE O/P EST MOD 30 MIN: CPT | Performed by: INTERNAL MEDICINE

## 2024-12-06 PROCEDURE — 1036F TOBACCO NON-USER: CPT | Performed by: INTERNAL MEDICINE

## 2024-12-06 PROCEDURE — G0439 PPPS, SUBSEQ VISIT: HCPCS | Performed by: INTERNAL MEDICINE

## 2024-12-06 PROCEDURE — 1170F FXNL STATUS ASSESSED: CPT | Performed by: INTERNAL MEDICINE

## 2024-12-06 PROCEDURE — 3074F SYST BP LT 130 MM HG: CPT | Performed by: INTERNAL MEDICINE

## 2024-12-06 PROCEDURE — 3044F HG A1C LEVEL LT 7.0%: CPT | Performed by: INTERNAL MEDICINE

## 2024-12-06 PROCEDURE — 84153 ASSAY OF PSA TOTAL: CPT

## 2024-12-06 RX ORDER — HYDROGEN PEROXIDE 3 %
20 SOLUTION, NON-ORAL MISCELLANEOUS 2 TIMES DAILY
Qty: 180 CAPSULE | Refills: 3 | Status: SHIPPED | OUTPATIENT
Start: 2024-12-06 | End: 2025-12-06

## 2024-12-06 ASSESSMENT — PATIENT HEALTH QUESTIONNAIRE - PHQ9
SUM OF ALL RESPONSES TO PHQ9 QUESTIONS 1 AND 2: 0
1. LITTLE INTEREST OR PLEASURE IN DOING THINGS: NOT AT ALL
2. FEELING DOWN, DEPRESSED OR HOPELESS: NOT AT ALL
1. LITTLE INTEREST OR PLEASURE IN DOING THINGS: NOT AT ALL
2. FEELING DOWN, DEPRESSED OR HOPELESS: NOT AT ALL
SUM OF ALL RESPONSES TO PHQ9 QUESTIONS 1 AND 2: 0

## 2024-12-06 ASSESSMENT — ACTIVITIES OF DAILY LIVING (ADL)
GROCERY_SHOPPING: INDEPENDENT
DOING_HOUSEWORK: INDEPENDENT
BATHING: INDEPENDENT
MANAGING_FINANCES: INDEPENDENT
DRESSING: INDEPENDENT
TAKING_MEDICATION: INDEPENDENT

## 2024-12-06 NOTE — ASSESSMENT & PLAN NOTE
Orders:    esomeprazole (NexIUM) 20 mg DR capsule; Take 1 capsule (20 mg) by mouth 2 times a day. Do not open capsule.

## 2024-12-06 NOTE — PROGRESS NOTES
"Subjective   Reason for Visit: Adebayo Puentes is an 74 y.o. male here for a Medicare Wellness visit.     Past Medical, Surgical, and Family History reviewed and updated in chart.    Reviewed all medications by prescribing practitioner or clinical pharmacist (such as prescriptions, OTCs, herbal therapies and supplements) and documented in the medical record.    HPI    Overall well   #1 ventral hernia-status post repair , no pain  #2 CAD- no CP, SOB  #3 HTN- no HA, CP, dizziness  #4 renal stones/BPH/cysts- no issues  #5 lipids-good. Recheck   #6 CKD stage 3 - no edema, no NSAIDSa  #7 hearing loss- stable. f/u ENT  #8 RC- s/p surgery. doing well. f/u ortho.   #9 hip fracture- resolved.   #10 globus sensation- s/p ENT, resolved  #11 fatigue- better  #12 depression- good. con't Rx.   #13 fall- balance much better, reviewed  #14 neck mass- resolved.    #15 insomnia/mood- good, con't rx.   #16 BPH- Follow-up urology   #17 TN- good   #18 IFBS- little exercise. Diet \"ok\"    Patient Care Team:  Mayda Carter MD as PCP - General  Mayda Carter MD as PCP - [facility] Attributed Provider  Mayda Carter MD as Primary Care Provider  Virginia Delgado MD as Surgeon (Otolaryngology)     Review of Systems    Objective   Vitals:  /86   Ht 1.829 m (6')   Wt 105 kg (230 lb 6.4 oz)   BMI 31.25 kg/m²       Physical Exam      Lab Results   Component Value Date    WBC 5.6 09/05/2024    HGB 13.9 09/05/2024    HCT 42.8 09/05/2024     09/05/2024    CHOL 136 03/06/2024    TRIG 83 03/06/2024    HDL 46.1 03/06/2024    ALT 39 03/06/2024    AST 34 09/08/2023     09/05/2024    K 3.8 09/05/2024     09/05/2024    CREATININE 1.50 (H) 09/05/2024    BUN 28 (H) 09/05/2024    CO2 31 09/05/2024    TSH 3.32 08/18/2022    PSA 4.82 (H) 03/06/2024    INR 1.0 09/08/2023    HGBA1C 5.7 (H) 03/06/2024       Assessment & Plan  Gastroesophageal reflux disease, unspecified whether esophagitis present    Orders:    esomeprazole (NexIUM) 20 mg  " capsule; Take 1 capsule (20 mg) by mouth 2 times a day. Do not open capsule.    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 Do not open capsule.    Impaired fasting blood sugar    Orders:    Hemoglobin A1C; Future     #1 ventral hernia-status post repair doing well. f/u surgery PRN  #2 CAD- class 1 dz. neg stress last. episode of non-acrdiac CP, neg eval 2/22. Clinically stable. f//u w/ Dr Howell- qyear.  #3 HTN- good. con't rx. f/u nephrology.  #4 renal stones/BPH/cysts-stable. f/u , appt pending  #5 lipids-good. Recheck 6 mths  #6 CKD stage 3 - f/u nephrology. Reviewed no NSAIDs.   #7 hearing loss- stable. f/u ENT  #8 RC- s/p surgery. doing well. f/u ortho.   #9 hip fracture- resolved.   #10 globus sensation- s/p ENT. f/u ENT  #11 fatigue- better  #12 depression- good. con't Rx.   #13 fall- balance much better, reviewed  #14 neck mass- resolved. f/u ENT PRN.   #15 insomnia/mood- good, con't rx.   #16 BPH- Follow-up urology   #17 TN- improved. s/p gamma-knife. con't lyrica, f/u neuro.  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 diet/exercise reviewed. retest ha1c.  #19 rectal bleeding- resolved.  Suspect hemorrhoids.  Check cbc, c/s GI  #20 prostate CA- f/u    #21 GERD/Carrillo's- reviewed.  f/u  GI pending   Full code  last colo 9/21--> is due for colonoscopy 2026

## 2024-12-11 ENCOUNTER — APPOINTMENT (OUTPATIENT)
Dept: OTOLARYNGOLOGY | Facility: HOSPITAL | Age: 74
End: 2024-12-11
Payer: MEDICARE

## 2024-12-18 ENCOUNTER — APPOINTMENT (OUTPATIENT)
Dept: GASTROENTEROLOGY | Facility: EXTERNAL LOCATION | Age: 74
End: 2024-12-18
Payer: MEDICARE

## 2025-01-06 ENCOUNTER — DOCUMENTATION (OUTPATIENT)
Dept: NEUROLOGY | Facility: HOSPITAL | Age: 75
End: 2025-01-06
Payer: MEDICARE

## 2025-01-06 DIAGNOSIS — G50.0 RIGHT TRIGEMINAL NEURALGIA: Primary | ICD-10-CM

## 2025-01-06 RX ORDER — CARBAMAZEPINE 100 MG/1
200 TABLET, CHEWABLE ORAL 2 TIMES DAILY
Qty: 120 TABLET | Refills: 11 | Status: SHIPPED | OUTPATIENT
Start: 2025-01-06 | End: 2026-01-06

## 2025-01-06 NOTE — PROGRESS NOTES
"Patient: Adebayo Puentes   MRN: [id number] YOB: 1950   Sex: male Age: 74 y.o.  Date of Service: 2025       ASSESSMENT AND PLAN  I discussed the findings with Adebayo Puentes \"Ed\" and have recommended the followin. Dysphagia to solids > liquids, history of GERD s/p Nissen fundoplication in  s/p esophagoscopy and dilation to 32mm on 10/17/24, biopsies revealed metaplasia    - Repeat esophagoscopy and dilation as needed  - Continue PPI  - Reschedule GI for Carrillo's follow-up, I have messaged Dr. Sutton who he was scheduled with in Dec    2. Dysphonia, vocal fold atrophy. Risks, benefits, alternatives of vocal fold injection discussed with the patient including but not limited to bleeding, infection, change in voice, no improvement in symptoms. He is now s/p bilateral injection laryngoplasty with Prolaryn gel on 25  - NPO x 1 hr  - Voice rest x 24 hr  - Follow-up with me 4-6 weeks for floresita      CHIEF COMPLAINT  Chief Complaint   Patient presents with    Dysphagia    injection       HISTORY OF PRESENT ILLNESS  Adebayo Puentes \"Ed\" is a 74 y.o. male referred by No ref. provider found for evaluation of dysphagia and dysphonia.  He has a history of GERD s/p Nissen fundoplication in . The patient reports his voice gets weak and will occasional go out. Has been going on for several years. He also notes issues with food sticking in his throat, also over the past several years.     25  He reports that he had some confusion scheduling the GI follow-up for Carrillo's. His voice still fluctuates and is interested in proceeding with vocal fold injection.    24  S/p esophagoscopy and dilation to 32mm on 10/17/24, biopsies revealed metaplasia. He is on PPI twice a day. Swallowing is improved overall, still has issues with phlegm. He reports the voice is overall better as well    24  He saw Leesa for therapy and notes some improvement in his voice. It is more steady.    The dysphagia " history includes:      Dysphagia for solids               yes    Dysphagia for liquids              occasional    Dysphagia for pills                  no  Associated weight loss            no    Recent pneumonia/bronchitis  no  GERD/Acid reflux   Prior Nissen on Nexxium as needed, very rare    ADDITIONAL HISTORY  Past Medical History  He has a past medical history of Abnormal findings on diagnostic imaging of other parts of musculoskeletal system (09/21/2016), BPH (benign prostatic hyperplasia), CKD (chronic kidney disease), Cutaneous abscess of neck (09/23/2021), Cyst of kidney, acquired (12/01/2015), Depression, GERD (gastroesophageal reflux disease), Hypertension, Old myocardial infarction, Old myocardial infarction (06/06/2013), Personal history of other diseases of the circulatory system, Personal history of other diseases of the circulatory system, Personal history of other diseases of the digestive system, Personal history of other diseases of the digestive system, Personal history of other diseases of urinary system, Personal history of other mental and behavioral disorders, Personal history of other specified conditions (09/02/2016), Personal history of other specified conditions, Personal history of other specified conditions, Prostate cancer (Multi), and Trigeminal neuralgia.    He has no past medical history of Asthma, Awareness under anesthesia, CHF (congestive heart failure), COPD (chronic obstructive pulmonary disease) (Multi), Delayed emergence from general anesthesia, Diabetes mellitus type I (Multi), Hard to intubate, Irregular heart beat, Malignant hyperthermia, PONV (postoperative nausea and vomiting), Refusal of blood product, Sleep apnea, or Type 2 diabetes mellitus. Surgical History  He has a past surgical history that includes Other surgical history (06/11/2013); Rotator cuff repair (06/11/2013); Other surgical history (09/23/2021); Other surgical history (09/23/2021); Gastric fundoplication  "(02/25/2015); US guided percutaneous peritoneal or retroperitoneal fluid collection drainage (02/10/2017); US guided abscess drain (02/10/2017); and Prostate surgery.   Social History  He reports that he has never smoked. He has never been exposed to tobacco smoke. He has never used smokeless tobacco. He reports that he does not currently use alcohol. He reports that he does not use drugs. Allergies  Patient has no known allergies.     Family History  Family History   Problem Relation Name Age of Onset    Other (heart trouble) Mother      Kidney disease Father      Heart disease Father      Other (hypoglycemia) Father      Other (heart trouble) Father          REVIEW OF SYSTEMS  All 10 systems were reviewed and negative except for above.      PHYSICAL EXAM  ENT Physical Exam   GENERAL: Well-nourished and developed, alert and appropriate, no distress, voice [id number]  RESPIRATORY: Breathing quietly, no stridor  HEAD: Normocephalic atraumatic  FACE: Symmetric, no masses or lesions  EYES:  Pupils reactive, sclera clear, external ocular muscles intact, no nystagmus.    EARS:  Pinnae normal. External auditory canals clear and tympanic membranes intact.  NOSE:  No anterior lesions, masses or polyps.  ORAL CAVITY/OROPHARYNX:  Buccal mucosa is moist without lesions or masses, tongue midline and palate elevates symmetrically. Tongue mobility intact.  NECK:  Soft. There is no lymphadenopathy or thyromegaly.  Redundant submental skin but palpable landmarks  NEUROLOGIC:  Cranial nerves II-XII grossly intact.       Last Recorded Vitals  Height 1.905 m (6' 3\"), weight 104 kg (230 lb).    RESULTS    Patient Reported Outcome Measures  N/A    Laboratory, Radiology, and Pathology  I personally reviewed the following results, with the following interpretation:   Path 10/17/24      Component    FINAL DIAGNOSIS   A.  GASTROESOPHAGEAL JUNCTION, BIOPSY:  - SQUAMOCOLUMNAR JUNCTIONAL MUCOSA DEMONSTRATING CHRONIC CARDITIS WITH INTESTINAL " METAPLASIA, NO DYSPLASIA IDENTIFIED.     B.  STOMACH, BIOPSY:  - UNREMARKABLE GASTRIC MUCOSA, NO HELICOBACTER IDENTIFIED.        MBS 8/20/24 - CP bar/web, limited eso follow-through        PROCEDURES  Flexible Fiberoptic Laryngoscopy with Injection Laryngoplasty    PREOPERATIVE DIAGNOSIS: dysphonia, vocal fold atrophy    POSTOPERATIVE DIAGNOSIS: Same    PROCEDURE: Transnasal videolaryngoscopy/bronchoscopy with injection medialization of the bialteral vocal folds, superior laryngeal nerve block    ANESTHESIA:  Topical    COMPLICATIONS:  None    SPECIMENS:  None    PROCEDURE IN DETAIL:  The risks, benefits, limitations, and alternatives of the procedure were described to the patient in detail.  The patient understood and wished to proceed.  The appropriate consents were obtained.    The patient was placed in the upright position in the procedure chair.  The nasal cavity was topically decongested anesthetized.  The distal chip video laryngoscope was passed through the nasal cavity.  The nasal cavity and nasopharynx were within normal limits except noted below. The following findings on laryngoscopy were noted:         Tongue Base: no masses or lesions             Left vocal fold mobility: mobile         Right vocal fold mobility: mobile         Glottal closure: complete         Laryngeal muscle tension: lateral         Symmetry: symmetric         Vocal fold free edge: no masses or lesions, bilateral atrophy         Trachea: subglottis patent         Other abnormalities: per above    Following the diagnostic laryngoscopy, we proceeded with the vocal fold injection.  The cervical skin was prepped in the usual clean fashion with an alcohol swab.  A bilateral superior laryngeal nerve block was performed and the region overlying the thyroid notch was infiltrated with 1% lidocaine with 1:100,000 units of epinephrine.  Video laryngoscopy was then performed with a channeled bronchoscope.  4 cc of 4% lidocaine was instilled  over the vocal folds and into the trachea to provide laryngotracheal anesthesia.  Under endoscopic visualization, through the thyrohyoid approach, 1 cc of Prolaryn gel was injected into the affected vocal folds (0.5cc per side) just lateral and anterior to the vocal process into the thyroid arytenoid muscle.  This provided excellent medialization.  The vocal folds were overcorrected by approximately 20%.     The patient tolerated the procedure well. There were no immediate complications.   ----------------------------------------------------------------------  Virginia Delgado MD, MAEd    [facility]  Department of Otolaryngology - Head and Neck Surgery  [facility]    Time Spent  Prep time on day of patient encounter: 10 minutes  Time spent directly with patient, family or caregiver: 30 minutes  Additional Time Spent on Patient Care Activities: 10 minutes  Documentation Time: 10 minutes  Other Time Spent: 0 minutes  Total: 60 minutes

## 2025-01-06 NOTE — PROGRESS NOTES
Telephone Note    His trigeminal neuralgia has flared up. Feels like crushed glass and hot water down the cheek, flared up since Thanksgiving 2024. Episodes have been coming and going. He has increased carbamazepine from 100 mg daily to 100 mg BID for a while. Some mornings, water on face flares it up. He is not laying on the right side.      Plan is to increase carbamazepine to 200 mg BID, starting with this evenings dose. Discussed if groggy/sleepy tomorrow morning, can gradually increase to 200 mg BID by first doing 100 mg AM/200 mg PM for about 3 days, then to 200 mg BID. If he does not feel sleepy tomorrow AM, can take 200 mg BID.    Will put in a new script. I asked that he update us in a few days.    Lyssa Lo MD  Neuromuscular Neurology  [facility]  Office Phone Number: [phone]

## 2025-01-07 ENCOUNTER — PROCEDURE VISIT (OUTPATIENT)
Dept: OTOLARYNGOLOGY | Facility: CLINIC | Age: 75
End: 2025-01-07
Payer: MEDICARE

## 2025-01-07 VITALS — BODY MASS INDEX: 28.6 KG/M2 | WEIGHT: 230 LBS | HEIGHT: 75 IN

## 2025-01-07 DIAGNOSIS — K22.70 BARRETT'S ESOPHAGUS DETERMINED BY BIOPSY: ICD-10-CM

## 2025-01-07 DIAGNOSIS — R13.10 DYSPHAGIA, UNSPECIFIED TYPE: ICD-10-CM

## 2025-01-07 DIAGNOSIS — R49.0 DYSPHONIA: Primary | ICD-10-CM

## 2025-01-07 DIAGNOSIS — J38.7 PRESBYLARYNGES: ICD-10-CM

## 2025-01-07 PROCEDURE — 99215 OFFICE O/P EST HI 40 MIN: CPT | Performed by: OTOLARYNGOLOGY

## 2025-01-07 PROCEDURE — 99215 OFFICE O/P EST HI 40 MIN: CPT | Mod: 25 | Performed by: OTOLARYNGOLOGY

## 2025-01-07 PROCEDURE — 31574 LARGSC W/NJX AUGMENTATION: CPT | Mod: 50,MUE | Performed by: OTOLARYNGOLOGY

## 2025-01-07 PROCEDURE — L8607 INJ VOCAL CORD BULKING AGENT: HCPCS | Performed by: OTOLARYNGOLOGY

## 2025-01-07 PROCEDURE — 31574 LARGSC W/NJX AUGMENTATION: CPT | Performed by: OTOLARYNGOLOGY

## 2025-01-07 ASSESSMENT — PATIENT HEALTH QUESTIONNAIRE - PHQ9
SUM OF ALL RESPONSES TO PHQ9 QUESTIONS 1 AND 2: 0
1. LITTLE INTEREST OR PLEASURE IN DOING THINGS: NOT AT ALL
2. FEELING DOWN, DEPRESSED OR HOPELESS: NOT AT ALL

## 2025-01-07 ASSESSMENT — PAIN SCALES - GENERAL: PAINLEVEL_OUTOF10: 6

## 2025-01-09 ENCOUNTER — APPOINTMENT (OUTPATIENT)
Dept: GASTROENTEROLOGY | Facility: CLINIC | Age: 75
End: 2025-01-09
Payer: MEDICARE

## 2025-01-13 ENCOUNTER — TELEPHONE (OUTPATIENT)
Dept: PRIMARY CARE | Facility: CLINIC | Age: 75
End: 2025-01-13
Payer: MEDICARE

## 2025-01-13 NOTE — TELEPHONE ENCOUNTER
Per VM-Patient is not comfortable with Dr. Schmitt and the care he is receiving.  Patient states PSA 03/06/24 was 4.8 and then on 12/06/24 it was 8.38-He has not received any communication from Dr. Schmitt's office and is asking for referral to another urology oncologist.    Patient can be reached at [phone] with questions or concerns regarding request   [FreeTextEntry1] : Alert. Fully oriented. Speech/language intact. CN grossly intact. Moving all limbs symmetrically. Gait is normal.\par

## 2025-01-15 ENCOUNTER — PATIENT MESSAGE (OUTPATIENT)
Dept: PRIMARY CARE | Facility: CLINIC | Age: 75
End: 2025-01-15
Payer: MEDICARE

## 2025-01-15 DIAGNOSIS — R97.20 ELEVATED PSA: ICD-10-CM

## 2025-01-17 ENCOUNTER — TELEPHONE (OUTPATIENT)
Dept: GASTROENTEROLOGY | Facility: HOSPITAL | Age: 75
End: 2025-01-17
Payer: MEDICARE

## 2025-01-28 ENCOUNTER — OFFICE VISIT (OUTPATIENT)
Dept: OTOLARYNGOLOGY | Facility: CLINIC | Age: 75
End: 2025-01-28
Payer: MEDICARE

## 2025-01-28 VITALS — SYSTOLIC BLOOD PRESSURE: 130 MMHG | HEART RATE: 94 BPM | DIASTOLIC BLOOD PRESSURE: 76 MMHG | TEMPERATURE: 97.5 F

## 2025-01-28 DIAGNOSIS — R49.0 MUSCLE TENSION DYSPHONIA: ICD-10-CM

## 2025-01-28 DIAGNOSIS — R13.10 DYSPHAGIA, UNSPECIFIED TYPE: ICD-10-CM

## 2025-01-28 DIAGNOSIS — J38.7 PRESBYLARYNGES: ICD-10-CM

## 2025-01-28 DIAGNOSIS — R49.0 DYSPHONIA: Primary | ICD-10-CM

## 2025-01-28 DIAGNOSIS — K22.70 BARRETT'S ESOPHAGUS DETERMINED BY BIOPSY: ICD-10-CM

## 2025-01-28 PROCEDURE — 31579 LARYNGOSCOPY TELESCOPIC: CPT | Performed by: OTOLARYNGOLOGY

## 2025-01-28 PROCEDURE — 3075F SYST BP GE 130 - 139MM HG: CPT | Performed by: OTOLARYNGOLOGY

## 2025-01-28 PROCEDURE — 1159F MED LIST DOCD IN RCRD: CPT | Performed by: OTOLARYNGOLOGY

## 2025-01-28 PROCEDURE — 99214 OFFICE O/P EST MOD 30 MIN: CPT | Mod: 25 | Performed by: OTOLARYNGOLOGY

## 2025-01-28 PROCEDURE — 99214 OFFICE O/P EST MOD 30 MIN: CPT | Performed by: OTOLARYNGOLOGY

## 2025-01-28 PROCEDURE — 1036F TOBACCO NON-USER: CPT | Performed by: OTOLARYNGOLOGY

## 2025-01-28 PROCEDURE — 3078F DIAST BP <80 MM HG: CPT | Performed by: OTOLARYNGOLOGY

## 2025-01-28 PROCEDURE — 1123F ACP DISCUSS/DSCN MKR DOCD: CPT | Performed by: OTOLARYNGOLOGY

## 2025-01-28 PROCEDURE — 1160F RVW MEDS BY RX/DR IN RCRD: CPT | Performed by: OTOLARYNGOLOGY

## 2025-01-28 NOTE — LETTER
"2025     Leesa Gutierrez, CHUCK  [address]  [facility] [address]    Patient: Sachin Puentes   YOB: 1950   Date of Visit: 2025       Dear Dr. Leesa Gutierrez, SLP:    Thank you for referring Sachin Puentes to me for evaluation. Below are my notes for this consultation.  If you have questions, please do not hesitate to call me. I look forward to following your patient along with you.       Sincerely,     Virginia Delgado MD      CC: No Recipients  ______________________________________________________________________________________    Patient: Adebayo Puentes   MRN: [id number] YOB: 1950   Sex: male Age: 74 y.o.  Date of Service: 2025       ASSESSMENT AND PLAN  I discussed the findings with Adebayo Puentes \"Ed\" and have recommended the followin. Dysphagia to solids > liquids, history of GERD s/p Nissen fundoplication in  s/p esophagoscopy and dilation to 32mm on 10/17/24, biopsies revealed metaplasia    - Repeat esophagoscopy and dilation as needed  - Continue PPI  - Scheduled to see Dr. Lewis 3/20/25 GI for Carrillo's follow-up    2. Dysphonia, vocal fold atrophy. Risks, benefits, alternatives of vocal fold injection discussed with the patient including but not limited to bleeding, infection, change in voice, no improvement in symptoms. He is now s/p bilateral injection laryngoplasty with Prolaryn gel on 25 with some mild improvement. 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 male who we have been following for dysphagia and dysphonia.  He has a history of GERD s/p Nissen fundoplication in . The patient reports his " voice gets weak and will occasional go out. Has been going on for several years. He also notes issues with food sticking in his throat, also over the past several years.     1/28/25  s/p bilateral injection laryngoplasty with Prolaryn gel on 1/7/25. 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 He is scheduled to see Dr. Lewis 3/20/25    1/6/25  He reports that he had some confusion scheduling the GI follow-up for Carrillo's. His voice still fluctuates and is interested in proceeding with vocal fold injection.    11/5/24  S/p esophagoscopy and dilation to 32mm on 10/17/24, biopsies revealed metaplasia. He is on PPI twice a day. Swallowing is improved overall, still has issues with phlegm. He reports the voice is overall better as well    9/18/24  He saw Leesa for therapy and notes some improvement in his voice. It is more steady.    The dysphagia history includes:      Dysphagia for solids               yes    Dysphagia for liquids              occasional    Dysphagia for pills                  no  Associated weight loss            no    Recent pneumonia/bronchitis  no  GERD/Acid reflux   Prior Nissen on Nexxium as needed, very rare    ADDITIONAL HISTORY  Past Medical History  He has a past medical history of Abnormal findings on diagnostic imaging of other parts of musculoskeletal system (09/21/2016), BPH (benign prostatic hyperplasia), CKD (chronic kidney disease), Cutaneous abscess of neck (09/23/2021), Cyst of kidney, acquired (12/01/2015), Depression, GERD (gastroesophageal reflux disease), Hypertension, Old myocardial infarction, Old myocardial infarction (06/06/2013), Personal history of other diseases of the circulatory system, Personal history of other diseases of the circulatory system, Personal history of other diseases of the digestive system, Personal history of other diseases of the digestive system, Personal history of other diseases of urinary system,  Personal history of other mental and behavioral disorders, Personal history of other specified conditions (09/02/2016), Personal history of other specified conditions, Personal history of other specified conditions, Prostate cancer (Multi), and Trigeminal neuralgia.    He has no past medical history of Asthma, Awareness under anesthesia, CHF (congestive heart failure), COPD (chronic obstructive pulmonary disease) (Multi), Delayed emergence from general anesthesia, Diabetes mellitus type I (Multi), Hard to intubate, Irregular heart beat, Malignant hyperthermia, PONV (postoperative nausea and vomiting), Refusal of blood product, Sleep apnea, or Type 2 diabetes mellitus. Surgical History  He has a past surgical history that includes Other surgical history (06/11/2013); Rotator cuff repair (06/11/2013); Other surgical history (09/23/2021); Other surgical history (09/23/2021); Gastric fundoplication (02/25/2015); US guided percutaneous peritoneal or retroperitoneal fluid collection drainage (02/10/2017); US guided abscess drain (02/10/2017); and Prostate surgery.   Social History  He reports that he has never smoked. He has never been exposed to tobacco smoke. He has never used smokeless tobacco. He reports that he does not currently use alcohol. He reports that he does not use drugs. Allergies  Patient has no known allergies.     Family History  Family History   Problem Relation Name Age of Onset   • Other (heart trouble) Mother     • Kidney disease Father     • Heart disease Father     • Other (hypoglycemia) Father     • Other (heart trouble) Father          REVIEW OF SYSTEMS  All 10 systems were reviewed and negative except for above.      PHYSICAL EXAM  ENT Physical Exam   GENERAL: Well-nourished and developed, alert and appropriate, no distress, voice [id number]  RESPIRATORY: Breathing quietly, no stridor  HEAD: Normocephalic atraumatic  FACE: Symmetric, no masses or lesions  EYES:  Pupils reactive, sclera clear,  external ocular muscles intact, no nystagmus.    EARS:  Pinnae normal. External auditory canals clear and tympanic membranes intact.  NOSE:  No anterior lesions, masses or polyps.  ORAL CAVITY/OROPHARYNX:  Buccal mucosa is moist without lesions or masses, tongue midline and palate elevates symmetrically. Tongue mobility intact.  NECK:  Soft. There is no lymphadenopathy or thyromegaly.  Redundant submental skin but palpable landmarks  NEUROLOGIC:  Cranial nerves II-XII grossly intact.       Last Recorded Vitals  There were no vitals taken for this visit.    RESULTS    Patient Reported Outcome Measures  N/A    Laboratory, Radiology, and Pathology  I personally reviewed the following results, with the following interpretation:   Path 10/17/24      Component    FINAL DIAGNOSIS   A.  GASTROESOPHAGEAL JUNCTION, BIOPSY:  - SQUAMOCOLUMNAR JUNCTIONAL MUCOSA DEMONSTRATING CHRONIC CARDITIS WITH INTESTINAL METAPLASIA, NO DYSPLASIA IDENTIFIED.     B.  STOMACH, BIOPSY:  - UNREMARKABLE GASTRIC MUCOSA, NO HELICOBACTER IDENTIFIED.        MBS 8/20/24 - CP bar/web, limited eso follow-through        PROCEDURES  Flexible Fiberoptic Laryngoscopy with Stroboscopy    Patient failed a mirror exam due to limitations of equipment and the need for stroboscopy to assess glottic vibration and closure.     PREOPERATIVE DIAGNOSIS: Dysphonia    POSTOPERATIVE DIAGNOSIS: Same    PROCEDURE:  Strobovideolaryngoscopy    ANESTHESIA:  Topical    COMPLICATIONS:  None    SPECIMENS:  None    PROCEDURE IN DETAIL: The patient was seated in an upright position.  The nasal cavity was topically decongested and anesthetized.  The stroboscopic microphone was held to the neck at the level of the larynx.  The distal chip video laryngoscope was passed through the nasal cavity.  The nasal cavity and nasopharynx were within normal limits except noted below.  The following findings on stroboscopy were noted:      Tongue Base: no masses or lesions   Vocal Fold Mobility                Right VF: mobile               Left VF: mobile   TVF Appearance               Edema/Erythema: none, improved contour s/p injection               Lesions/vibratory margin irregularities: none   Glottic Closure Pattern: complete    Vibration:               Phase: symmetric   Periodicity: regular               Amplitude: normal                Waveform: normal    Muscle Tension Patterns:  lateral > AP, at times circumferential when losing breath support   Other Findings: none    The patient tolerated the procedure well.     ----------------------------------------------------------------------  Virginia Delgado MD, MAEd    [facility]  Department of Otolaryngology - Head and Neck Surgery  [facility]    Time Spent  Prep time on day of patient encounter: 10 minutes  Time spent directly with patient, family or caregiver: 30 minutes  Additional Time Spent on Patient Care Activities: 10 minutes  Documentation Time: 10 minutes  Other Time Spent: 0 minutes  Total: 60 minutes

## 2025-01-28 NOTE — PROGRESS NOTES
"Patient: Adebayo Puentes   MRN: [id number] YOB: 1950   Sex: male Age: 74 y.o.  Date of Service: 2025       ASSESSMENT AND PLAN  I discussed the findings with Adebayo Puentes \"Ed\" and have recommended the followin. Dysphagia to solids > liquids, history of GERD s/p Nissen fundoplication in  s/p esophagoscopy and dilation to 32mm on 10/17/24, biopsies revealed metaplasia    - Repeat esophagoscopy and dilation as needed  - Continue PPI  - Scheduled to see Dr. Lewis 3/20/25 GI for Carrillo's follow-up    2. Dysphonia, vocal fold atrophy. Risks, benefits, alternatives of vocal fold injection discussed with the patient including but not limited to bleeding, infection, change in voice, no improvement in symptoms. He is now s/p bilateral injection laryngoplasty with Prolaryn gel on 25 with some mild improvement. 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 male who we have been following for dysphagia and dysphonia.  He has a history of GERD s/p Nissen fundoplication in . The patient reports his voice gets weak and will occasional go out. Has been going on for several years. He also notes issues with food sticking in his throat, also over the past several years.     25  s/p bilateral injection laryngoplasty with Prolaryn gel on 25. 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 He is scheduled to see Dr. Lewis 3/20/25    1/6/25  He reports that he had some confusion scheduling the GI follow-up for Carrillo's. His voice still fluctuates and is interested in proceeding with vocal fold injection.    24  S/p " esophagoscopy and dilation to 32mm on 10/17/24, biopsies revealed metaplasia. He is on PPI twice a day. Swallowing is improved overall, still has issues with phlegm. He reports the voice is overall better as well    9/18/24  He saw Leesa for therapy and notes some improvement in his voice. It is more steady.    The dysphagia history includes:      Dysphagia for solids               yes    Dysphagia for liquids              occasional    Dysphagia for pills                  no  Associated weight loss            no    Recent pneumonia/bronchitis  no  GERD/Acid reflux   Prior Nissen on Nexxium as needed, very rare    ADDITIONAL HISTORY  Past Medical History  He has a past medical history of Abnormal findings on diagnostic imaging of other parts of musculoskeletal system (09/21/2016), BPH (benign prostatic hyperplasia), CKD (chronic kidney disease), Cutaneous abscess of neck (09/23/2021), Cyst of kidney, acquired (12/01/2015), Depression, GERD (gastroesophageal reflux disease), Hypertension, Old myocardial infarction, Old myocardial infarction (06/06/2013), Personal history of other diseases of the circulatory system, Personal history of other diseases of the circulatory system, Personal history of other diseases of the digestive system, Personal history of other diseases of the digestive system, Personal history of other diseases of urinary system, Personal history of other mental and behavioral disorders, Personal history of other specified conditions (09/02/2016), Personal history of other specified conditions, Personal history of other specified conditions, Prostate cancer (Multi), and Trigeminal neuralgia.    He has no past medical history of Asthma, Awareness under anesthesia, CHF (congestive heart failure), COPD (chronic obstructive pulmonary disease) (Multi), Delayed emergence from general anesthesia, Diabetes mellitus type I (Multi), Hard to intubate, Irregular heart beat, Malignant hyperthermia, PONV  (postoperative nausea and vomiting), Refusal of blood product, Sleep apnea, or Type 2 diabetes mellitus. Surgical History  He has a past surgical history that includes Other surgical history (06/11/2013); Rotator cuff repair (06/11/2013); Other surgical history (09/23/2021); Other surgical history (09/23/2021); Gastric fundoplication (02/25/2015); US guided percutaneous peritoneal or retroperitoneal fluid collection drainage (02/10/2017); US guided abscess drain (02/10/2017); and Prostate surgery.   Social History  He reports that he has never smoked. He has never been exposed to tobacco smoke. He has never used smokeless tobacco. He reports that he does not currently use alcohol. He reports that he does not use drugs. Allergies  Patient has no known allergies.     Family History  Family History   Problem Relation Name Age of Onset    Other (heart trouble) Mother      Kidney disease Father      Heart disease Father      Other (hypoglycemia) Father      Other (heart trouble) Father          REVIEW OF SYSTEMS  All 10 systems were reviewed and negative except for above.      PHYSICAL EXAM  ENT Physical Exam   GENERAL: Well-nourished and developed, alert and appropriate, no distress, voice [id number]  RESPIRATORY: Breathing quietly, no stridor  HEAD: Normocephalic atraumatic  FACE: Symmetric, no masses or lesions  EYES:  Pupils reactive, sclera clear, external ocular muscles intact, no nystagmus.    EARS:  Pinnae normal. External auditory canals clear and tympanic membranes intact.  NOSE:  No anterior lesions, masses or polyps.  ORAL CAVITY/OROPHARYNX:  Buccal mucosa is moist without lesions or masses, tongue midline and palate elevates symmetrically. Tongue mobility intact.  NECK:  Soft. There is no lymphadenopathy or thyromegaly.  Redundant submental skin but palpable landmarks  NEUROLOGIC:  Cranial nerves II-XII grossly intact.       Last Recorded Vitals  There were no vitals taken for this  visit.    RESULTS    Patient Reported Outcome Measures  N/A    Laboratory, Radiology, and Pathology  I personally reviewed the following results, with the following interpretation:   Path 10/17/24      Component    FINAL DIAGNOSIS   A.  GASTROESOPHAGEAL JUNCTION, BIOPSY:  - SQUAMOCOLUMNAR JUNCTIONAL MUCOSA DEMONSTRATING CHRONIC CARDITIS WITH INTESTINAL METAPLASIA, NO DYSPLASIA IDENTIFIED.     B.  STOMACH, BIOPSY:  - UNREMARKABLE GASTRIC MUCOSA, NO HELICOBACTER IDENTIFIED.        MBS 8/20/24 - CP bar/web, limited eso follow-through        PROCEDURES  Flexible Fiberoptic Laryngoscopy with Stroboscopy    Patient failed a mirror exam due to limitations of equipment and the need for stroboscopy to assess glottic vibration and closure.     PREOPERATIVE DIAGNOSIS: Dysphonia    POSTOPERATIVE DIAGNOSIS: Same    PROCEDURE:  Strobovideolaryngoscopy    ANESTHESIA:  Topical    COMPLICATIONS:  None    SPECIMENS:  None    PROCEDURE IN DETAIL: The patient was seated in an upright position.  The nasal cavity was topically decongested and anesthetized.  The stroboscopic microphone was held to the neck at the level of the larynx.  The distal chip video laryngoscope was passed through the nasal cavity.  The nasal cavity and nasopharynx were within normal limits except noted below.  The following findings on stroboscopy were noted:      Tongue Base: no masses or lesions   Vocal Fold Mobility               Right VF: mobile               Left VF: mobile   TVF Appearance               Edema/Erythema: none, improved contour s/p injection               Lesions/vibratory margin irregularities: none   Glottic Closure Pattern: complete    Vibration:               Phase: symmetric   Periodicity: regular               Amplitude: normal                Waveform: normal    Muscle Tension Patterns:  lateral > AP, at times circumferential when losing breath support   Other Findings: none    The patient tolerated the procedure well.      ----------------------------------------------------------------------  Virginia Delgado MD, MAEd    [facility]  Department of Otolaryngology - Head and Neck Surgery  [facility]    Time Spent  Prep time on day of patient encounter: 10 minutes  Time spent directly with patient, family or caregiver: 30 minutes  Additional Time Spent on Patient Care Activities: 10 minutes  Documentation Time: 10 minutes  Other Time Spent: 0 minutes  Total: 60 minutes

## 2025-01-30 ENCOUNTER — DOCUMENTATION (OUTPATIENT)
Dept: NEUROLOGY | Facility: HOSPITAL | Age: 75
End: 2025-01-30
Payer: MEDICARE

## 2025-01-30 NOTE — PROGRESS NOTES
January 30, 2025:    On Carbamazepine 200 mg twice a day    The eye rubbing is not triggering the pain    Has pain in the bottom back and jaw area, he feels the pain is in the joint area.     Last night, he did not sleep well due to rolling over and the pain would wake him up--cheeck, jaw, and back of the teeth.     The greatest burning is where the jaw moves.     When he wipes the nose, the pain can be triggered.    The tip of the tongue feels swollen and feels like there is rice on it, has been going on for a while; he feels as well there is a film over his lips when eating.     Regarding his voice--he is not losing his voice in conversation, has been following with ENT Virginia Delgado--has received some injections in the vocal cord.     Plan:    -- Will increase carbamazepine 300 mg BID, if tolerating after a few days, 400 mg BID.  -- uncertain what the reason for tongue feeling swollen is--has been going on for a while--it is the sensation of it and not that the tongue is actually swollen  -- he is planning to see the dentist on Monday as well    Lyssa Lo MD  Neuromuscular Neurology  [facility]  Office Phone Number: [phone]

## 2025-02-13 ENCOUNTER — APPOINTMENT (OUTPATIENT)
Dept: SPEECH THERAPY | Facility: CLINIC | Age: 75
End: 2025-02-13
Payer: MEDICARE

## 2025-02-14 DIAGNOSIS — G50.0 TRIGEMINAL NEURALGIA: ICD-10-CM

## 2025-02-14 RX ORDER — GABAPENTIN 100 MG/1
200 CAPSULE ORAL 2 TIMES DAILY
Qty: 360 CAPSULE | Refills: 1 | Status: SHIPPED | OUTPATIENT
Start: 2025-02-14 | End: 2025-08-13

## 2025-02-21 DIAGNOSIS — I10 HYPERTENSION, UNSPECIFIED TYPE: ICD-10-CM

## 2025-02-21 RX ORDER — LOSARTAN POTASSIUM AND HYDROCHLOROTHIAZIDE 25; 100 MG/1; MG/1
1 TABLET ORAL DAILY
Qty: 30 TABLET | Refills: 5 | Status: SHIPPED | OUTPATIENT
Start: 2025-02-21 | End: 2025-08-20

## 2025-03-06 ENCOUNTER — HOSPITAL ENCOUNTER (OUTPATIENT)
Dept: CARDIOLOGY | Facility: CLINIC | Age: 75
Discharge: HOME | End: 2025-03-06
Payer: MEDICARE

## 2025-03-06 ENCOUNTER — OFFICE VISIT (OUTPATIENT)
Dept: CARDIOLOGY | Facility: CLINIC | Age: 75
End: 2025-03-06
Payer: MEDICARE

## 2025-03-06 VITALS
HEIGHT: 74 IN | SYSTOLIC BLOOD PRESSURE: 147 MMHG | HEART RATE: 59 BPM | DIASTOLIC BLOOD PRESSURE: 92 MMHG | WEIGHT: 232.5 LBS | BODY MASS INDEX: 29.84 KG/M2

## 2025-03-06 DIAGNOSIS — E78.2 MIXED HYPERLIPIDEMIA: ICD-10-CM

## 2025-03-06 DIAGNOSIS — R06.00 DYSPNEA, UNSPECIFIED: ICD-10-CM

## 2025-03-06 DIAGNOSIS — I20.0 ANGINA PECTORIS, UNSTABLE (MULTI): ICD-10-CM

## 2025-03-06 DIAGNOSIS — I25.10 CORONARY ARTERY DISEASE INVOLVING NATIVE CORONARY ARTERY OF NATIVE HEART WITHOUT ANGINA PECTORIS: ICD-10-CM

## 2025-03-06 DIAGNOSIS — I35.0 NONRHEUMATIC AORTIC VALVE STENOSIS: ICD-10-CM

## 2025-03-06 DIAGNOSIS — I25.10 CORONARY ARTERY DISEASE INVOLVING NATIVE CORONARY ARTERY OF NATIVE HEART WITHOUT ANGINA PECTORIS: Primary | ICD-10-CM

## 2025-03-06 DIAGNOSIS — I10 HYPERTENSION, UNSPECIFIED TYPE: ICD-10-CM

## 2025-03-06 DIAGNOSIS — R06.09 DOE (DYSPNEA ON EXERTION): ICD-10-CM

## 2025-03-06 LAB
AORTIC VALVE MEAN GRADIENT: 11 MMHG
AORTIC VALVE PEAK VELOCITY: 2.22 M/S
ATRIAL RATE: 60 BPM
AV PEAK GRADIENT: 20 MMHG
AVA (PEAK VEL): 2.15 CM2
AVA (VTI): 2.17 CM2
EJECTION FRACTION APICAL 4 CHAMBER: 75.2
EJECTION FRACTION: 68 %
LEFT ATRIUM VOLUME AREA LENGTH INDEX BSA: 35.8 ML/M2
LEFT VENTRICLE INTERNAL DIMENSION DIASTOLE: 4.61 CM (ref 3.5–6)
LEFT VENTRICULAR OUTFLOW TRACT DIAMETER: 2.23 CM
MITRAL VALVE E/A RATIO: 0.81
P AXIS: 70 DEGREES
P OFFSET: 139 MS
P ONSET: 116 MS
PR INTERVAL: 192 MS
Q ONSET: 212 MS
QRS COUNT: 10 BEATS
QRS DURATION: 98 MS
QT INTERVAL: 402 MS
QTC CALCULATION(BAZETT): 402 MS
QTC FREDERICIA: 402 MS
R AXIS: 19 DEGREES
RIGHT VENTRICLE FREE WALL PEAK S': 8 CM/S
RIGHT VENTRICLE PEAK SYSTOLIC PRESSURE: 33.4 MMHG
T AXIS: 45 DEGREES
T OFFSET: 413 MS
TRICUSPID ANNULAR PLANE SYSTOLIC EXCURSION: 2 CM
VENTRICULAR RATE: 60 BPM

## 2025-03-06 PROCEDURE — 1036F TOBACCO NON-USER: CPT | Performed by: NURSE PRACTITIONER

## 2025-03-06 PROCEDURE — 93306 TTE W/DOPPLER COMPLETE: CPT | Performed by: INTERNAL MEDICINE

## 2025-03-06 PROCEDURE — 1160F RVW MEDS BY RX/DR IN RCRD: CPT | Performed by: NURSE PRACTITIONER

## 2025-03-06 PROCEDURE — 93306 TTE W/DOPPLER COMPLETE: CPT

## 2025-03-06 PROCEDURE — 99215 OFFICE O/P EST HI 40 MIN: CPT | Performed by: NURSE PRACTITIONER

## 2025-03-06 PROCEDURE — 1159F MED LIST DOCD IN RCRD: CPT | Performed by: NURSE PRACTITIONER

## 2025-03-06 PROCEDURE — 3080F DIAST BP >= 90 MM HG: CPT | Performed by: NURSE PRACTITIONER

## 2025-03-06 PROCEDURE — 1123F ACP DISCUSS/DSCN MKR DOCD: CPT | Performed by: NURSE PRACTITIONER

## 2025-03-06 PROCEDURE — 3077F SYST BP >= 140 MM HG: CPT | Performed by: NURSE PRACTITIONER

## 2025-03-06 PROCEDURE — 93005 ELECTROCARDIOGRAM TRACING: CPT | Performed by: NURSE PRACTITIONER

## 2025-03-06 NOTE — PROGRESS NOTES
"Chief Complaint:   CAD     History Of Present Illness:    Adebayo Puentes \"Ed\" is a 74 y.o. male here with for follow-up of coronary artery disease.  Adebayo \"Ed\" initially presented with acute MI and underwent successful thrombolytic therapy (2000).  The patient is tolerating guideline-directed medical therapy with antiplatelet and statin medication and is compliant.  The patient exercises regularly and follows a heart healthy diet.  The patient has been well since their last office appointment and is not having any anginal symptoms.     He continues to get winded easily. Just walking from echo room to exam room he is SOB. Denies LE edema and chest pain.     Dizzy with position changes. Has not been syncopal.     Looses balance easily. Is on several medications for trigeminal neuralgia.     Water: 3-4 bottles of sparking water per day   2 cups coffee a day     Nuclear Stress Test 3/6/2024   - Normal stress ecg  - small sized area of scar involving the apical inferior wall. No evidence of significant ischemia (unchanged from prior)  - severe hypokinesis involving the apical inferior segment (present on previous stress; however, it was mild)  - EF 62%    TTE 3/6/2024   1. Left ventricular systolic function is normal.   2. Moderately increased left ventricular septal thickness.   3. Spectral Doppler shows an impaired relaxation pattern of left ventricular diastolic filling.   4. Aortic valve sclerosis.    Family Hx:  Father MI  Brother MI     Allergies:  Patient has no known allergies.    Review of Systems  All pertinent systems have been reviewed and are negative except for what is stated in the history of present illness.    All other systems have been reviewed and are negative and noncontributory to this patient's current ailments.     Visit Vitals  BP (!) 147/92   Pulse 59   Ht 1.88 m (6' 2\")   Wt 105 kg (232 lb 8 oz)   BMI 29.85 kg/m²   Smoking Status Never   BSA 2.34 m²       Last Labs:  CBC -  Lab Results "   Component Value Date    WBC 5.6 09/05/2024    HGB 13.9 09/05/2024    HCT 42.8 09/05/2024    MCV 95 09/05/2024     09/05/2024       CMP -  Lab Results   Component Value Date    CALCIUM 10.1 09/05/2024    PHOS 3.8 09/05/2024    PROT 7.6 09/08/2023    ALBUMIN 4.6 09/05/2024    AST 34 09/08/2023    ALT 39 03/06/2024    ALKPHOS 117 09/08/2023    BILITOT 0.6 09/08/2023       LIPID PANEL -   Lab Results   Component Value Date    CHOL 136 03/06/2024    TRIG 83 03/06/2024    HDL 46.1 03/06/2024    CHHDL 3.0 03/06/2024    LDLF 65 02/13/2023    VLDL 17 03/06/2024    NHDL 90 03/06/2024       RENAL FUNCTION PANEL -   Lab Results   Component Value Date    GLUCOSE 86 09/05/2024     09/05/2024    K 3.8 09/05/2024     09/05/2024    CO2 31 09/05/2024    ANIONGAP 13 09/05/2024    BUN 28 (H) 09/05/2024    CREATININE 1.50 (H) 09/05/2024    GFRMALE 58 (A) 08/11/2023    CALCIUM 10.1 09/05/2024    PHOS 3.8 09/05/2024    ALBUMIN 4.6 09/05/2024        Lab Results   Component Value Date    BNP 83 02/15/2022    HGBA1C 5.7 (H) 12/06/2024     Objective   Vitals reviewed.   Constitutional:       Appearance: Healthy appearance. Not in distress.   Eyes:      Conjunctiva/sclera: Conjunctivae normal.   Neck:      Vascular: No JVR. JVD normal.   Pulmonary:      Effort: Pulmonary effort is normal.      Breath sounds: Normal breath sounds. No wheezing. No rhonchi. No rales.   Chest:      Chest wall: Not tender to palpatation.   Cardiovascular:      PMI at left midclavicular line. Normal rate. Regular rhythm. Normal S1. Normal S2.       Murmurs: There is a grade 1/6 midsystolic murmur at the URSB.      No gallop.  No click. No rub.   Edema:     Peripheral edema absent.   Abdominal:      General: Bowel sounds are normal.      Palpations: Abdomen is soft.      Tenderness: There is no abdominal tenderness.   Musculoskeletal: Normal range of motion.         General: No tenderness. Skin:     General: Skin is warm and dry.   Neurological:       General: No focal deficit present.      Mental Status: Alert and oriented to person, place and time.   Psychiatric:         Attention and Perception: Attention normal.         Mood and Affect: Mood normal.       Assessment/Plan   Diagnoses and all orders for this visit:  Coronary artery disease involving native coronary artery of native heart, unspecified whether angina present  - EKG SB at 60bpm   - stress test negative for ischemia  - small area of scar consistent with previous MI  - concern that SOB is anginal equivalent. It continues to worsen, is exertional and improves with rest. Since stress test was negative for new ischemia last year we will pursue coronary CTA   [place]  - SOB continues to worsen with activity  - see above   Angina   - see above   Hypertension, unspecified type  - elevated today; however, well controlled at previous office visit   - on atenolol, amlodipine, losartan-hydrochlorothiazide   Hyperlipidemia, unspecified hyperlipidemia type  - stable  - atorvastatin  - need updated lipids   Dizzy  - with position changes  - increase hydration   - trigeminal neuralgia meds may be contributing       Follow up post coronary CTA.     Outpatient Medications:  Current Outpatient Medications   Medication Instructions    amLODIPine (NORVASC) 10 mg, oral, Daily    ascorbic acid, vitamin C, 1,000 mg ER tablet Take by mouth.    aspirin 81 mg, Daily    atenolol (TENORMIN) 25 mg, oral, Daily    atorvastatin (LIPITOR) 40 mg, oral, Daily    calcium carbonate-vitamin D3 (Caltrate with Vitamin D3) 600 mg-20 mcg (800 unit) tablet 2 times daily    carBAMazepine (TEGRETOL) 200 mg, oral, 2 times daily    cholecalciferol (Vitamin D3) 5,000 Units tablet 1 tablet, Once Weekly    escitalopram (LEXAPRO) 20 mg, oral, Daily    esomeprazole (NEXIUM) 20 mg, oral, 2 times daily, Do not open capsule.    gabapentin (NEURONTIN) 200 mg, oral, 2 times daily    losartan-hydrochlorothiazide (Hyzaar) 100-25 mg tablet 1 tablet, oral,  Daily    montelukast (SINGULAIR) 10 mg, oral, Daily    multivitamin with minerals iron-free (Centrum Silver) 1 tablet    Myrbetriq 50 mg, oral, Daily    traZODone (DESYREL) 100 mg, oral, Nightly       Exclusive of any other services or procedures performed, I, Toshia TREVINO, spent 40 minutes in duration for this visit today.  This time consisted of chart review, obtaining history, and/or performing the exam as documented above, as well as, documenting the clinical information for the encounter in the electronic record, discussing treatment options, plans, and/or goals with patient, family, and/or caregiver, refilling medications, updating the electronic record, ordering medicines, lab work, imaging, referrals, and/or procedures as documented above and communicating with other [facility] professionals. I have discussed the results of laboratory, radiology, and cardiology studies with the patient and their family/caregiver.       I reviewed neuro/pcp/nephrology's notes, previous cardiac testing, labs

## 2025-03-07 ENCOUNTER — TREATMENT (OUTPATIENT)
Dept: SPEECH THERAPY | Facility: CLINIC | Age: 75
End: 2025-03-07
Payer: MEDICARE

## 2025-03-07 DIAGNOSIS — J38.02 BILATERAL VOCAL CORD PARESIS: Primary | ICD-10-CM

## 2025-03-07 DIAGNOSIS — R49.0 MUSCLE TENSION DYSPHONIA: ICD-10-CM

## 2025-03-07 PROCEDURE — 92507 TX SP LANG VOICE COMM INDIV: CPT | Mod: GN

## 2025-03-07 ASSESSMENT — PAIN SCALES - GENERAL: PAINLEVEL_OUTOF10: 0 - NO PAIN

## 2025-03-07 ASSESSMENT — PAIN - FUNCTIONAL ASSESSMENT: PAIN_FUNCTIONAL_ASSESSMENT: 0-10

## 2025-03-07 NOTE — PROGRESS NOTES
"Speech-Language Pathology    [facility] Adult Outpatient Speech-Language Pathology Treatment     Patient Name: Adebayo Puentes \"Ed\"  MRN: [id number]  Today's Date: 3/7/2025     Time Calculation  Start Time: 1000  Stop Time: 1050  Time Calculation (min): 50 min      Current Problem:   1. Bilateral vocal cord paresis        2. Muscle tension dysphonia          Pain Assessment:  Pain Assessment  Pain Assessment: 0-10  0-10 (Numeric) Pain Score: 0 - No pain    SUBJECTIVE  Patient alert and oriented and ready to participate in office visit this date.  On 1/7/25 the patient underwent office based bilateral vocal cord injections with Dr. Delgado using Prolaryn Gel (0.5 cc bilaterally).      OBJECTIVE  LONG TERM GOAL (MET 3/7/25)  Improve overall vocal health to foster increased participation levels at home, work and in the community environment.  Patient will demonstrate reduction in symptoms as evidenced by improved scores on VHI-10, RSI, and/or CSI following treatment.  Improve ability to tolerate the least restrictive diet without signs/symptoms of aspiration. (NEW 9/5/24)     SHORT TERM GOALS (MET 3/7/25)  Patient will increase vocal wellness and decrease phono trauma in adherence with clinician prescribed vocal hygiene and wellness program per patient report 80% of his/her day.  Patient will increase ability to produce voice without tension within 5 minute conversational task x 80% accuracy as judged by clinician observation and/or patient report.  Patient will demonstrate independent use of voice/swallow (NEW 9/5/24) techniques x 80% accuracy.  Patient will increase the balance/strength of the respiratory/laryngeal/swallowing (NEW 9/5/24) musculature x 80% accuracy.     ASSESSMENT  The patient reports that after 2-3 weeks following vocal cord injectiones his voice stabilized and has been strong since. He states he is able to speak publicly for extended periods of time without feeling laryngeal tension of vocal fatigue. He is very " happy with his voice. Discussed role for rebalancing voice therapy exercises. As explained to the patient there is no harm in completing them but not absolutely necessary at this point. Diagnostic therapy indicated that the patient is using adequate diaphragmatic breath support with speech and no indication of MTD.     Additionally, since his esophagoscopy and dilation with Dr. Delgado he does not have any swallowing complaints.    Therefore, therapy will be discontinued at this time. Patient in agreement.       PLAN  Discharge patient 2/2 goals met  Patient to follow up with physician as needed  Patient/caregiver agreeable with POC

## 2025-03-07 NOTE — PROGRESS NOTES
"Speech-Language Pathology    Discharge Summary    Name: Adebayo Puentes \"Ed\"  MRN: [id number]  : 1950  Date: 25    Discharge Summary: [facility]    Rehab Discharge Reason: Achieved all and/or the most significant goals(s)  "

## 2025-03-08 LAB
ANION GAP SERPL CALCULATED.4IONS-SCNC: 14 MMOL/L (CALC) (ref 7–17)
BUN SERPL-MCNC: 22 MG/DL (ref 7–25)
BUN/CREAT SERPL: 17 (CALC) (ref 6–22)
CALCIUM SERPL-MCNC: 9.5 MG/DL (ref 8.6–10.3)
CHLORIDE SERPL-SCNC: 104 MMOL/L (ref 98–110)
CO2 SERPL-SCNC: 25 MMOL/L (ref 20–32)
CREAT SERPL-MCNC: 1.3 MG/DL (ref 0.7–1.28)
EGFRCR SERPLBLD CKD-EPI 2021: 57 ML/MIN/1.73M2
GLUCOSE SERPL-MCNC: 76 MG/DL (ref 65–99)
POTASSIUM SERPL-SCNC: 4.4 MMOL/L (ref 3.5–5.3)
SODIUM SERPL-SCNC: 143 MMOL/L (ref 135–146)

## 2025-03-11 ENCOUNTER — DOCUMENTATION (OUTPATIENT)
Dept: NEUROLOGY | Facility: HOSPITAL | Age: 75
End: 2025-03-11
Payer: MEDICARE

## 2025-03-11 DIAGNOSIS — G50.0 TRIGEMINAL NEURALGIA: Primary | ICD-10-CM

## 2025-03-11 LAB
ATRIAL RATE: 60 BPM
P AXIS: 70 DEGREES
P OFFSET: 139 MS
P ONSET: 116 MS
PR INTERVAL: 192 MS
Q ONSET: 212 MS
QRS COUNT: 10 BEATS
QRS DURATION: 98 MS
QT INTERVAL: 402 MS
QTC CALCULATION(BAZETT): 402 MS
QTC FREDERICIA: 402 MS
R AXIS: 19 DEGREES
T AXIS: 45 DEGREES
T OFFSET: 413 MS
VENTRICULAR RATE: 60 BPM

## 2025-03-11 RX ORDER — PREDNISONE 20 MG/1
20 TABLET ORAL DAILY
Qty: 7 TABLET | Refills: 0 | Status: SHIPPED | OUTPATIENT
Start: 2025-03-11 | End: 2025-03-18

## 2025-03-11 NOTE — PROGRESS NOTES
March 11, 2025    Spoke with Mr. Puentes regarding his recent trigeminal neuralgia flare-up.    He had taken carbamazepine 400 mg in AM and 400 mg in PM; he felt sedated and discoordinated with that and went back down to carbamazepine 300 mg in AM and 400 mg PM.     End of January was on 200 mg carbmazepine twice a day.    Gabapentin 200 mg twice a day. Can trial increasing gabapentin from 200 mg in AM and 300 mg in PM. He will try this and see if he can tolerate this increase.    When he gets the pain episodes, it feels it is the top molars to the nostrils and to the ear, sometimes from the jaw upwards as well. Always involves upper part of mouth. The rest of it is what it decides to do. When the pain was worse, he could not even talk.     Will also prescribe prednisone 15 mg daily for 7 days.     Plan:  -- increase gabapentin to 300 mg at night; continue gabapentin 200 mg in AM  -- continue carbamazepine 300 mg in AM and 400 mg PM  -- start prednisone 20 mg daily X 7 days in AM  -- if he cannot tolerate the gabapentin increase, we may trial keppra or other medication  -- he has mild kidney disease so any medication needs to be considered with renal metabolism in mind    Lyssa Lo MD  Neuromuscular Neurology  [facility]  Office Phone Number: [phone]

## 2025-03-12 DIAGNOSIS — C61 PROSTATE CANCER (MULTI): Primary | ICD-10-CM

## 2025-03-20 ENCOUNTER — APPOINTMENT (OUTPATIENT)
Dept: GASTROENTEROLOGY | Facility: HOSPITAL | Age: 75
End: 2025-03-20
Payer: MEDICARE

## 2025-03-20 DIAGNOSIS — G50.0 RIGHT TRIGEMINAL NEURALGIA: ICD-10-CM

## 2025-03-20 DIAGNOSIS — I10 HYPERTENSION, UNSPECIFIED TYPE: ICD-10-CM

## 2025-03-20 RX ORDER — LOSARTAN POTASSIUM AND HYDROCHLOROTHIAZIDE 25; 100 MG/1; MG/1
1 TABLET ORAL DAILY
Qty: 90 TABLET | Refills: 1 | Status: SHIPPED | OUTPATIENT
Start: 2025-03-20 | End: 2025-09-16

## 2025-03-20 RX ORDER — CARBAMAZEPINE 100 MG/1
TABLET, CHEWABLE ORAL
Qty: 210 TABLET | Refills: 11 | Status: SHIPPED | OUTPATIENT
Start: 2025-03-20

## 2025-03-20 NOTE — TELEPHONE ENCOUNTER
Rx Refill Request Telephone Encounter  losartan-hydrochlorothiazide (Hyzaar) 100-25 mg tablet    90 day supply    [facility]    NOV:  6/6/25

## 2025-03-27 ENCOUNTER — ANESTHESIA EVENT (OUTPATIENT)
Dept: GASTROENTEROLOGY | Facility: HOSPITAL | Age: 75
End: 2025-03-27
Payer: MEDICARE

## 2025-03-27 ENCOUNTER — HOSPITAL ENCOUNTER (OUTPATIENT)
Dept: GASTROENTEROLOGY | Facility: HOSPITAL | Age: 75
Discharge: HOME | End: 2025-03-27
Payer: MEDICARE

## 2025-03-27 ENCOUNTER — ANESTHESIA (OUTPATIENT)
Dept: GASTROENTEROLOGY | Facility: HOSPITAL | Age: 75
End: 2025-03-27
Payer: MEDICARE

## 2025-03-27 VITALS
HEIGHT: 75 IN | DIASTOLIC BLOOD PRESSURE: 72 MMHG | OXYGEN SATURATION: 94 % | HEART RATE: 57 BPM | WEIGHT: 230 LBS | SYSTOLIC BLOOD PRESSURE: 139 MMHG | TEMPERATURE: 97.1 F | BODY MASS INDEX: 28.6 KG/M2 | RESPIRATION RATE: 15 BRPM

## 2025-03-27 DIAGNOSIS — K20.90 BARRETT'S ESOPHAGUS WITH ESOPHAGITIS: ICD-10-CM

## 2025-03-27 DIAGNOSIS — K22.70 BARRETT'S ESOPHAGUS WITH ESOPHAGITIS: ICD-10-CM

## 2025-03-27 PROBLEM — I21.9 MI (MYOCARDIAL INFARCTION) (MULTI): Status: ACTIVE | Noted: 2025-03-27

## 2025-03-27 PROCEDURE — 7100000010 HC PHASE TWO TIME - EACH INCREMENTAL 1 MINUTE

## 2025-03-27 PROCEDURE — 43239 EGD BIOPSY SINGLE/MULTIPLE: CPT | Performed by: INTERNAL MEDICINE

## 2025-03-27 PROCEDURE — A43239 PR EDG TRANSORAL BIOPSY SINGLE/MULTIPLE: Performed by: ANESTHESIOLOGY

## 2025-03-27 PROCEDURE — 2500000004 HC RX 250 GENERAL PHARMACY W/ HCPCS (ALT 636 FOR OP/ED): Performed by: ANESTHESIOLOGIST ASSISTANT

## 2025-03-27 PROCEDURE — 99100 ANES PT EXTEME AGE<1 YR&>70: CPT | Performed by: ANESTHESIOLOGY

## 2025-03-27 PROCEDURE — A43239 PR EDG TRANSORAL BIOPSY SINGLE/MULTIPLE: Performed by: ANESTHESIOLOGIST ASSISTANT

## 2025-03-27 PROCEDURE — 3700000001 HC GENERAL ANESTHESIA TIME - INITIAL BASE CHARGE

## 2025-03-27 PROCEDURE — 7100000009 HC PHASE TWO TIME - INITIAL BASE CHARGE

## 2025-03-27 PROCEDURE — 3700000002 HC GENERAL ANESTHESIA TIME - EACH INCREMENTAL 1 MINUTE

## 2025-03-27 RX ORDER — SODIUM CHLORIDE, SODIUM LACTATE, POTASSIUM CHLORIDE, CALCIUM CHLORIDE 600; 310; 30; 20 MG/100ML; MG/100ML; MG/100ML; MG/100ML
100 INJECTION, SOLUTION INTRAVENOUS CONTINUOUS
OUTPATIENT
Start: 2025-03-27 | End: 2025-03-28

## 2025-03-27 RX ORDER — LIDOCAINE HCL/PF 100 MG/5ML
SYRINGE (ML) INTRAVENOUS AS NEEDED
Status: DISCONTINUED | OUTPATIENT
Start: 2025-03-27 | End: 2025-03-27

## 2025-03-27 RX ORDER — ONDANSETRON HYDROCHLORIDE 2 MG/ML
4 INJECTION, SOLUTION INTRAVENOUS ONCE AS NEEDED
OUTPATIENT
Start: 2025-03-27

## 2025-03-27 RX ORDER — LIDOCAINE IN NACL,ISO-OSMOT/PF 30 MG/3 ML
0.1 SYRINGE (ML) INJECTION ONCE
OUTPATIENT
Start: 2025-03-27 | End: 2025-03-27

## 2025-03-27 RX ORDER — ACETAMINOPHEN 325 MG/1
650 TABLET ORAL EVERY 4 HOURS PRN
OUTPATIENT
Start: 2025-03-27

## 2025-03-27 RX ORDER — PROPOFOL 10 MG/ML
INJECTION, EMULSION INTRAVENOUS CONTINUOUS PRN
Status: DISCONTINUED | OUTPATIENT
Start: 2025-03-27 | End: 2025-03-27

## 2025-03-27 RX ORDER — FENTANYL CITRATE 50 UG/ML
INJECTION, SOLUTION INTRAMUSCULAR; INTRAVENOUS AS NEEDED
Status: DISCONTINUED | OUTPATIENT
Start: 2025-03-27 | End: 2025-03-27

## 2025-03-27 RX ADMIN — PROPOFOL 100 MCG/KG/MIN: 10 INJECTION, EMULSION INTRAVENOUS at 11:05

## 2025-03-27 RX ADMIN — PROPOFOL 50 MG: 10 INJECTION, EMULSION INTRAVENOUS at 11:06

## 2025-03-27 RX ADMIN — LIDOCAINE HYDROCHLORIDE 100 MG: 20 INJECTION, SOLUTION INTRAVENOUS at 11:05

## 2025-03-27 RX ADMIN — SODIUM CHLORIDE, SODIUM LACTATE, POTASSIUM CHLORIDE, AND CALCIUM CHLORIDE: 600; 310; 30; 20 INJECTION, SOLUTION INTRAVENOUS at 10:59

## 2025-03-27 RX ADMIN — FENTANYL CITRATE 50 MCG: 50 INJECTION, SOLUTION INTRAMUSCULAR; INTRAVENOUS at 10:59

## 2025-03-27 RX ADMIN — FENTANYL CITRATE 50 MCG: 50 INJECTION, SOLUTION INTRAMUSCULAR; INTRAVENOUS at 11:05

## 2025-03-27 ASSESSMENT — PAIN SCALES - GENERAL
PAINLEVEL_OUTOF10: 0 - NO PAIN

## 2025-03-27 ASSESSMENT — PAIN - FUNCTIONAL ASSESSMENT
PAIN_FUNCTIONAL_ASSESSMENT: 0-10

## 2025-03-27 NOTE — ANESTHESIA PREPROCEDURE EVALUATION
"Patient: Adebayo Puentes \"Ed\"    Procedure Information       Date/Time: 03/27/25 1150    Scheduled providers: Myron Lewis MD    Procedure: EGD    [facility]: [facility]            Relevant Problems   Anesthesia (within normal limits)  No family hx MH      Cardiac   (+) Angina pectoris, unstable (Multi)   (+) Atherosclerotic heart disease of native coronary artery without angina pectoris   (+) CAD (coronary artery disease)   (+) Essential (primary) hypertension   (+) Hyperlipidemia   (+) Hypertension   (+) MI (myocardial infarction) (Multi) (25 yrs ago)   (+) Mixed hyperlipidemia      Pulmonary (within normal limits)      Neuro  R Trigeminal neuralgia   (+) Depression   (+) Idiopathic peripheral neuropathy   (+) Major depressive disorder, single episode, unspecified   (+) Recurrent major depressive disorder (CMS-HCC)   (+) Right trigeminal neuralgia   (+) Trigeminus neuralgia      GI   (+) Dysphagia   (+) GERD (gastroesophageal reflux disease)      /Renal   (+) BPH (benign prostatic hyperplasia)   (+) BPH with obstruction/lower urinary tract symptoms   (+) Nephrolithiasis   (+) Prostate cancer (Multi)      Liver (within normal limits)      Endocrine   (+) 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)      Hematology (within normal limits)      Musculoskeletal (within normal limits)      HEENT   (+) Bilateral sensorineural hearing loss   (+) Seasonal allergies      ID (within normal limits)      Skin (within normal limits)      GYN (within normal limits)       Clinical information reviewed:                   NPO Detail:  No data recorded     Physical Exam    Airway  Mallampati: III  TM distance: >3 FB  Neck ROM: full     Cardiovascular    Dental   Comments: intact   Pulmonary    Abdominal            Anesthesia Plan    History of general anesthesia?: yes  History of complications of general anesthesia?: no    ASA 3     MAC     intravenous induction   Anesthetic plan and risks discussed " with patient.  Use of blood products discussed with patient who consented to blood products.    Plan discussed with CAA.

## 2025-03-27 NOTE — H&P
"Subjective     History of Present Illness:   Adebayo Puentes \"Ed\" is a 74 y.o. male who presents to endoscopy    Physical Exam  General: not in acute distress  CV: regular rate and rhythm  Resp: non-labored breathing   "

## 2025-03-27 NOTE — ANESTHESIA POSTPROCEDURE EVALUATION
"Patient: Adebayo Puentes \"Ed\"    Procedure Summary       Date: 03/27/25 Room / Location: [facility]    Anesthesia Start: 1059 Anesthesia Stop: 1127    Procedure: EGD Diagnosis: Carrillo's esophagus with esophagitis    Scheduled Providers: Myron Lewis MD Responsible Provider: Elizabeth Marcelino MD    Anesthesia Type: MAC ASA Status: 3            Anesthesia Type: MAC    Vitals Value Taken Time   /72 03/27/25 1153   Temp 36.2 °C (97.1 °F) 03/27/25 1123   Pulse 57 03/27/25 1153   Resp 15 03/27/25 1153   SpO2 94 % 03/27/25 1153       Anesthesia Post Evaluation    Patient location during evaluation: PACU  Patient participation: complete - patient participated  Level of consciousness: awake  Pain management: adequate  Airway patency: patent  Cardiovascular status: acceptable  Respiratory status: acceptable  Hydration status: acceptable  Postoperative Nausea and Vomiting: none        There were no known notable events for this encounter.    "

## 2025-04-01 ENCOUNTER — HOSPITAL ENCOUNTER (OUTPATIENT)
Dept: RADIOLOGY | Facility: HOSPITAL | Age: 75
Discharge: HOME | End: 2025-04-01
Payer: MEDICARE

## 2025-04-01 ENCOUNTER — PREP FOR PROCEDURE (OUTPATIENT)
Dept: RADIOLOGY | Facility: HOSPITAL | Age: 75
End: 2025-04-01

## 2025-04-01 VITALS
HEIGHT: 75 IN | HEART RATE: 52 BPM | RESPIRATION RATE: 18 BRPM | DIASTOLIC BLOOD PRESSURE: 70 MMHG | BODY MASS INDEX: 28.6 KG/M2 | OXYGEN SATURATION: 95 % | WEIGHT: 230 LBS | SYSTOLIC BLOOD PRESSURE: 112 MMHG

## 2025-04-01 DIAGNOSIS — I25.10 CORONARY ARTERY DISEASE INVOLVING NATIVE CORONARY ARTERY OF NATIVE HEART WITHOUT ANGINA PECTORIS: ICD-10-CM

## 2025-04-01 DIAGNOSIS — R93.1 ABNORMAL FINDINGS ON DIAGNOSTIC IMAGING OF HEART AND CORONARY CIRCULATION: ICD-10-CM

## 2025-04-01 DIAGNOSIS — I20.0 ANGINA PECTORIS, UNSTABLE (MULTI): ICD-10-CM

## 2025-04-01 PROCEDURE — 75574 CT ANGIO HRT W/3D IMAGE: CPT | Performed by: STUDENT IN AN ORGANIZED HEALTH CARE EDUCATION/TRAINING PROGRAM

## 2025-04-01 PROCEDURE — 2500000001 HC RX 250 WO HCPCS SELF ADMINISTERED DRUGS (ALT 637 FOR MEDICARE OP): Performed by: RADIOLOGY

## 2025-04-01 PROCEDURE — 75574 CT ANGIO HRT W/3D IMAGE: CPT

## 2025-04-01 PROCEDURE — 2550000001 HC RX 255 CONTRASTS: Performed by: NURSE PRACTITIONER

## 2025-04-01 PROCEDURE — 75580 N-INVAS EST C FFR SW ALY CTA: CPT

## 2025-04-01 RX ORDER — METOPROLOL TARTRATE 1 MG/ML
5 INJECTION, SOLUTION INTRAVENOUS ONCE AS NEEDED
Status: CANCELLED | OUTPATIENT
Start: 2025-04-01

## 2025-04-01 RX ORDER — METOPROLOL TARTRATE 1 MG/ML
5 INJECTION, SOLUTION INTRAVENOUS ONCE
Status: DISCONTINUED | OUTPATIENT
Start: 2025-04-01 | End: 2025-04-02 | Stop reason: HOSPADM

## 2025-04-01 RX ORDER — LORAZEPAM 2 MG/ML
0.5 INJECTION INTRAMUSCULAR EVERY 5 MIN PRN
Status: CANCELLED | OUTPATIENT
Start: 2025-04-01

## 2025-04-01 RX ORDER — LORAZEPAM 2 MG/ML
0.5 INJECTION INTRAMUSCULAR EVERY 5 MIN PRN
Status: DISCONTINUED | OUTPATIENT
Start: 2025-04-01 | End: 2025-04-02 | Stop reason: HOSPADM

## 2025-04-01 RX ORDER — METOPROLOL TARTRATE 50 MG/1
100 TABLET ORAL ONCE AS NEEDED
Status: DISCONTINUED | OUTPATIENT
Start: 2025-04-01 | End: 2025-04-02 | Stop reason: HOSPADM

## 2025-04-01 RX ORDER — METOPROLOL TARTRATE 1 MG/ML
5 INJECTION, SOLUTION INTRAVENOUS ONCE AS NEEDED
Status: DISCONTINUED | OUTPATIENT
Start: 2025-04-01 | End: 2025-04-02 | Stop reason: HOSPADM

## 2025-04-01 RX ORDER — METOPROLOL TARTRATE 100 MG/1
100 TABLET ORAL ONCE AS NEEDED
Status: CANCELLED | OUTPATIENT
Start: 2025-04-01

## 2025-04-01 RX ORDER — METOPROLOL TARTRATE 1 MG/ML
5 INJECTION, SOLUTION INTRAVENOUS ONCE
Status: CANCELLED | OUTPATIENT
Start: 2025-04-01 | End: 2025-04-01

## 2025-04-01 RX ORDER — METOPROLOL TARTRATE 100 MG/1
100 TABLET ORAL ONCE
Status: CANCELLED | OUTPATIENT
Start: 2025-04-01 | End: 2025-04-01

## 2025-04-01 RX ORDER — NITROGLYCERIN 0.4 MG/1
0.8 TABLET SUBLINGUAL ONCE
Status: COMPLETED | OUTPATIENT
Start: 2025-04-01 | End: 2025-04-01

## 2025-04-01 RX ORDER — METOPROLOL TARTRATE 50 MG/1
100 TABLET ORAL ONCE
Status: DISCONTINUED | OUTPATIENT
Start: 2025-04-01 | End: 2025-04-02 | Stop reason: HOSPADM

## 2025-04-01 RX ORDER — NITROGLYCERIN 0.4 MG/1
0.8 TABLET SUBLINGUAL ONCE
Status: CANCELLED | OUTPATIENT
Start: 2025-04-01 | End: 2025-04-01

## 2025-04-01 RX ADMIN — NITROGLYCERIN 0.8 MG: 0.4 TABLET SUBLINGUAL at 12:09

## 2025-04-01 RX ADMIN — IOHEXOL 70 ML: 350 INJECTION, SOLUTION INTRAVENOUS at 12:12

## 2025-04-04 ENCOUNTER — DOCUMENTATION (OUTPATIENT)
Dept: NEUROLOGY | Facility: CLINIC | Age: 75
End: 2025-04-04
Payer: MEDICARE

## 2025-04-04 DIAGNOSIS — G50.0 TRIGEMINAL NEURALGIA: Primary | ICD-10-CM

## 2025-04-04 NOTE — PROGRESS NOTES
2025    Brushing teeth, talking can aggravate the pain.    Currently takin.) carbamazepine 300 mg (3 X 100 mg) AM and 400 mg (4 X 100) at night time.   2.) Gabapentin 200 mg (2 X 100 mg) AM and 300 mg (3 X 100 mg) at night time    Does feel sleepy in the morning time that lasts until 1 PM, more confused and imbalanced as well.     Plan:  -- will decrease gabapentin from 300 mg at bedtime to 200 mg at bedtime to see if the side effect burden can lessen  -- referral to headache and facial pain specialist  -- will reach out to neurosurgery as patient has prior gamma knife which was helpful in .      Lyssa Lo MD  Neuromuscular Neurology  [facility]  Office Phone Number: [phone]

## 2025-04-09 ENCOUNTER — OFFICE VISIT (OUTPATIENT)
Dept: CARDIOLOGY | Facility: CLINIC | Age: 75
End: 2025-04-09
Payer: MEDICARE

## 2025-04-09 VITALS
BODY MASS INDEX: 29.37 KG/M2 | SYSTOLIC BLOOD PRESSURE: 109 MMHG | WEIGHT: 235 LBS | DIASTOLIC BLOOD PRESSURE: 70 MMHG | HEART RATE: 69 BPM

## 2025-04-09 DIAGNOSIS — I25.10 CORONARY ARTERY DISEASE INVOLVING NATIVE CORONARY ARTERY OF NATIVE HEART WITHOUT ANGINA PECTORIS: Primary | ICD-10-CM

## 2025-04-09 DIAGNOSIS — I10 HYPERTENSION, UNSPECIFIED TYPE: ICD-10-CM

## 2025-04-09 DIAGNOSIS — E78.2 MIXED HYPERLIPIDEMIA: ICD-10-CM

## 2025-04-09 DIAGNOSIS — R06.09 DOE (DYSPNEA ON EXERTION): ICD-10-CM

## 2025-04-09 PROCEDURE — 1036F TOBACCO NON-USER: CPT | Performed by: NURSE PRACTITIONER

## 2025-04-09 PROCEDURE — 1160F RVW MEDS BY RX/DR IN RCRD: CPT | Performed by: NURSE PRACTITIONER

## 2025-04-09 PROCEDURE — 1159F MED LIST DOCD IN RCRD: CPT | Performed by: NURSE PRACTITIONER

## 2025-04-09 PROCEDURE — 99215 OFFICE O/P EST HI 40 MIN: CPT | Performed by: NURSE PRACTITIONER

## 2025-04-09 PROCEDURE — 3078F DIAST BP <80 MM HG: CPT | Performed by: NURSE PRACTITIONER

## 2025-04-09 PROCEDURE — 3074F SYST BP LT 130 MM HG: CPT | Performed by: NURSE PRACTITIONER

## 2025-04-09 PROCEDURE — 1123F ACP DISCUSS/DSCN MKR DOCD: CPT | Performed by: NURSE PRACTITIONER

## 2025-04-09 NOTE — PROGRESS NOTES
"Chief Complaint:   CAD     History Of Present Illness:    Adebayo Puentes \"Ed\" is a 74 y.o. male here with for follow-up of coronary artery disease.  Adebayo \"Ed\" initially presented with acute MI and underwent successful thrombolytic therapy (2000).  The patient is tolerating guideline-directed medical therapy with antiplatelet and statin medication and is compliant.  The patient exercises regularly and follows a heart healthy diet.  The patient has been well since their last office appointment and is not having any anginal symptoms.     He continues to get winded easily. Just walking from echo room to exam room he is SOB. Denies LE edema and chest pain.     Dizzy with position changes. Has not been syncopal.     Looses balance easily. Is on several medications for trigeminal neuralgia.     Water: 3-4 bottles of sparking water per day   2 cups coffee a day     Nuclear Stress Test 3/6/2024   - Normal stress ecg  - small sized area of scar involving the apical inferior wall. No evidence of significant ischemia (unchanged from prior)  - severe hypokinesis involving the apical inferior segment (present on previous stress; however, it was mild)  - EF 62%    CAC score 4/1/2025  IMPRESSION:  1. STENOSIS: Normal coronary anatomy with scattered coronary artery  atherosclerosis. Focal noncalcified plaque of dominant proximal  diagonal branch with estimated moderate stenosis (50-69%). Mild  nonobstructive atherosclerosis of distal left main coronary artery,  proximal LAD and distal RCA (up to 1-24% stenosis) CT FFR results  will be dictated as an addendum when available  2. PLAQUE BURDEN: Total coronary artery calcium score of 40.9,  indicating mild amount of calcified coronary plaque.  3. Status post Nissen fundoplication. Note is made of a small hiatal  hernia. The visualized esophagus appears mildly patulous with mild  concentric thickening in its distal aspect; correlate with concern  for reflux esophagitis.        TTE " 3/6/2024   1. Left ventricular systolic function is normal.   2. Moderately increased left ventricular septal thickness.   3. Spectral Doppler shows an impaired relaxation pattern of left ventricular diastolic filling.   4. Aortic valve sclerosis.    Family Hx:  Father MI  Brother MI     Allergies:  Patient has no known allergies.    Review of Systems  All pertinent systems have been reviewed and are negative except for what is stated in the history of present illness.    All other systems have been reviewed and are negative and noncontributory to this patient's current ailments.     Visit Vitals  /70 (BP Location: Right arm, Patient Position: Sitting, BP Cuff Size: Large adult)   Pulse 69   Wt 107 kg (235 lb)   BMI 29.37 kg/m²   Smoking Status Never   BSA 2.38 m²       Last Labs:  CBC -  Lab Results   Component Value Date    WBC 5.6 09/05/2024    HGB 13.9 09/05/2024    HCT 42.8 09/05/2024    MCV 95 09/05/2024     09/05/2024       CMP -  Lab Results   Component Value Date    CALCIUM 9.5 03/06/2025    PHOS 3.8 09/05/2024    PROT 7.6 09/08/2023    ALBUMIN 4.6 09/05/2024    AST 34 09/08/2023    ALT 39 03/06/2024    ALKPHOS 117 09/08/2023    BILITOT 0.6 09/08/2023       LIPID PANEL -   Lab Results   Component Value Date    CHOL 136 03/06/2024    TRIG 83 03/06/2024    HDL 46.1 03/06/2024    CHHDL 3.0 03/06/2024    LDLF 65 02/13/2023    VLDL 17 03/06/2024    NHDL 90 03/06/2024       RENAL FUNCTION PANEL -   Lab Results   Component Value Date    GLUCOSE 76 03/06/2025     03/06/2025    K 4.4 03/06/2025     03/06/2025    CO2 25 03/06/2025    ANIONGAP 14 03/06/2025    BUN 22 03/06/2025    CREATININE 1.30 (H) 03/06/2025    GFRMALE 58 (A) 08/11/2023    CALCIUM 9.5 03/06/2025    PHOS 3.8 09/05/2024    ALBUMIN 4.6 09/05/2024        Lab Results   Component Value Date    BNP 83 02/15/2022    HGBA1C 5.7 (H) 12/06/2024     Objective   Vitals reviewed.   Constitutional:       Appearance: Healthy appearance.  Not in distress.   Eyes:      Conjunctiva/sclera: Conjunctivae normal.   Neck:      Vascular: No JVR. JVD normal.   Pulmonary:      Effort: Pulmonary effort is normal.      Breath sounds: Normal breath sounds. No wheezing. No rhonchi. No rales.   Chest:      Chest wall: Not tender to palpatation.   Cardiovascular:      PMI at left midclavicular line. Normal rate. Regular rhythm. Normal S1. Normal S2.       Murmurs: There is a grade 1/6 midsystolic murmur at the URSB.      No gallop.  No click. No rub.   Edema:     Peripheral edema absent.   Abdominal:      General: Bowel sounds are normal.      Palpations: Abdomen is soft.      Tenderness: There is no abdominal tenderness.   Musculoskeletal: Normal range of motion.         General: No tenderness. Skin:     General: Skin is warm and dry.   Neurological:      General: No focal deficit present.      Mental Status: Alert and oriented to person, place and time.   Psychiatric:         Attention and Perception: Attention normal.         Mood and Affect: Mood normal.       Assessment/Plan   Diagnoses and all orders for this visit:  Coronary artery disease involving native coronary artery of native heart, unspecified whether angina present  - EKG SB at 60bpm   - stress test negative for ischemia  - small area of scar consistent with previous MI  - concern that SOB is anginal equivalent. Coronary CTA showed 50-69% dx of diag. I reviewed patient's case with Dr. Howell. We will wait for heart flow results; however, it is unlikely this is the cause of SOB and likely not amenable to stent.   WALLIS  - SOB continues with activity. Consider pulmonology referral   - see above   Hypertension, unspecified type  - elevated today; however, well controlled at previous office visit   - on atenolol, amlodipine, losartan-hydrochlorothiazide   Hyperlipidemia, unspecified hyperlipidemia type  - stable  - atorvastatin  - need updated lipids     Outpatient Medications:  Current Outpatient  Medications   Medication Instructions    amLODIPine (NORVASC) 10 mg, oral, Daily    ascorbic acid, vitamin C, 1,000 mg ER tablet Take by mouth.    aspirin 81 mg, Daily    atenolol (TENORMIN) 25 mg, oral, Daily    atorvastatin (LIPITOR) 40 mg, oral, Daily    calcium carbonate-vitamin D3 (Caltrate with Vitamin D3) 600 mg-20 mcg (800 unit) tablet 2 times daily    carBAMazepine (TEGretol) 100 mg chewable tablet Take 300 mg in the AM, and 400 mg at night    cholecalciferol (Vitamin D3) 5,000 Units tablet 1 tablet, Once Weekly    escitalopram (LEXAPRO) 20 mg, oral, Daily    esomeprazole (NEXIUM) 20 mg, oral, 2 times daily, Do not open capsule.    gabapentin (NEURONTIN) 200 mg, oral, 2 times daily    losartan-hydrochlorothiazide (Hyzaar) 100-25 mg tablet 1 tablet, oral, Daily    montelukast (SINGULAIR) 10 mg, oral, Daily    multivitamin with minerals iron-free (Centrum Silver) 1 tablet    Myrbetriq 50 mg, oral, Daily    traZODone (DESYREL) 100 mg, oral, Nightly     Exclusive of any other services or procedures performed, IToshia, spent 40 minutes in duration for this visit today.  This time consisted of chart review, obtaining history, and/or performing the exam as documented above, as well as, documenting the clinical information for the encounter in the electronic record, discussing treatment options, plans, and/or goals with patient, family, and/or caregiver, refilling medications, updating the electronic record, ordering medicines, lab work, imaging, referrals, and/or procedures as documented above and communicating with other [facility] professionals. I have discussed the results of laboratory, radiology, and cardiology studies with the patient and their family/caregiver.       I reviewed neuro/pcp/nephrology's notes, previous cardiac testing, labs   Reviewed CT scan, stress test and patient's case with Dr. Howell

## 2025-04-11 ENCOUNTER — OFFICE VISIT (OUTPATIENT)
Dept: NEUROLOGY | Facility: HOSPITAL | Age: 75
End: 2025-04-11
Payer: MEDICARE

## 2025-04-11 VITALS
SYSTOLIC BLOOD PRESSURE: 123 MMHG | DIASTOLIC BLOOD PRESSURE: 83 MMHG | HEART RATE: 64 BPM | BODY MASS INDEX: 29.22 KG/M2 | WEIGHT: 235 LBS | HEIGHT: 75 IN | RESPIRATION RATE: 18 BRPM | TEMPERATURE: 97.2 F

## 2025-04-11 DIAGNOSIS — G50.0 TRIGEMINAL NEURALGIA: Primary | ICD-10-CM

## 2025-04-11 PROCEDURE — 3079F DIAST BP 80-89 MM HG: CPT | Performed by: PSYCHIATRY & NEUROLOGY

## 2025-04-11 PROCEDURE — 1159F MED LIST DOCD IN RCRD: CPT | Performed by: PSYCHIATRY & NEUROLOGY

## 2025-04-11 PROCEDURE — 3074F SYST BP LT 130 MM HG: CPT | Performed by: PSYCHIATRY & NEUROLOGY

## 2025-04-11 PROCEDURE — 1036F TOBACCO NON-USER: CPT | Performed by: PSYCHIATRY & NEUROLOGY

## 2025-04-11 PROCEDURE — 1123F ACP DISCUSS/DSCN MKR DOCD: CPT | Performed by: PSYCHIATRY & NEUROLOGY

## 2025-04-11 PROCEDURE — 99205 OFFICE O/P NEW HI 60 MIN: CPT | Performed by: PSYCHIATRY & NEUROLOGY

## 2025-04-11 PROCEDURE — 1125F AMNT PAIN NOTED PAIN PRSNT: CPT | Performed by: PSYCHIATRY & NEUROLOGY

## 2025-04-11 PROCEDURE — 99215 OFFICE O/P EST HI 40 MIN: CPT | Performed by: PSYCHIATRY & NEUROLOGY

## 2025-04-11 RX ORDER — OXCARBAZEPINE 150 MG/1
TABLET, FILM COATED ORAL
Qty: 150 TABLET | Refills: 2 | Status: SHIPPED | OUTPATIENT
Start: 2025-04-11

## 2025-04-11 ASSESSMENT — PAIN SCALES - GENERAL: PAINLEVEL_OUTOF10: 4

## 2025-04-11 NOTE — ASSESSMENT & PLAN NOTE
Switch from carbamazepine to trileptal. 300 Am and 450 pm If you need a higher dose feel free to add an extra 150 mg pill.   Call me on Monday to report how weekend went.   [phone]  Continue Gabapentin  Follow  up with Dr Riddle    Mri of brain with and without

## 2025-04-11 NOTE — LETTER
April 11, 2025     Mayda Carter MD  [address]  [facility]  [facility] [address]    Patient: Sachin Puentes   YOB: 1950   Date of Visit: 4/11/2025       Dear Dr. Mayda Carter MD:    Thank you for referring Sachin Puentes to me for evaluation. Below are my notes for this consultation.  If you have questions, please do not hesitate to call me. I look forward to following your patient along with you.       Sincerely,     Annie José MD      CC: Lyssa Lo MD  ______________________________________________________________________________________    Subjective    Chief Complaint: Facial Pain    Adebayo Puentes is a 74 y.o. year old male who presents with chief complaint of facial pain, referred by Dr Lo. He has medical hx including trigeminal neuralgia, prostate cancer, GERD s/p Nissen fundoplication in 1986 s/p esophagoscopy and dilation to 32mm on 10/17/24, dysphonia and vocal cord atrophy (follows with ENT), HTN, CAD, and CKD.    Adebayo started getting facial pain in 4/2021. The pain was originally in his R jawbone/cheek/teeth, described as hot water and knife-like pain (4-5 to 8-9/10 severity), with episodes lasting up to 15-20 minutes. Pain was triggered by chewing and brushing teeth. No relief with tylenol. He originally thought this was dental related, saw desists with no issues, had a bite guard made, no relief. Eventually diagnosed with trigeminal neuralgia after a few months. He had tried pregabalin for the pain. He had gamma knife radiosurgery to R trigeminal nerve in 10/2021 with excellent outcome, improving frequency and severity of pain. He was then stable on carbamazepine 100mg daily with rare pain episodes. Pregabalin was stopped d/t nephrology preference.    His pain recurred around 11/2024. The pain is now worse (12/10 severity), with hot water/knife like pain from L upper lip/nose radiating up to R eye, and R jaw and teeth. Episodes last up to 1-2 hours.  Episodes are occurring daily. Pain is also accompanied by significant watery eyes (R>L) and rhinorrhea. He has not noticed any redness in his eyes (but has not looked). This is making it very difficult to eat, talk, brush teeth, and some pain episodes are triggered by moving his head. Also triggered by sneezing, coughing, or blowing his nose. Pain tends to occur more so in the evening time. Pain sometimes wakes him up from sleep.     He is now taking carbamazepine 300mg in AM and 400mg in PM, in addition to gabapentin 200mg BID (had to decrease dose d/t imbalance). He also tried Prednisone 15mg daily for 7 days in early March 2025, with no relief/difference in pain.     Last MRI brain w/wo con was done 10/2021 showed tortuous vertebrobasilar system with tortuous basilar artery contacting the cisternal segment of R trigeminal nerve with asymmetric diminished caliber of R trigeminal nerve.    Other medications:  Amlodipine  ASA  Atenolol  Atorvastatin  Tegretol  Escitalopram  Esomeprazole  Gabapentin  Losartan/hydrochlorothiazide  Montelukast  Myrbetriq  Trazadone     Labs:  CrCl 74ml/min based on BMP from 3/6/25.    ROS: As per HPI, otherwise all other systems have been reviewed are negative for complaint.     Past Medical History:   Diagnosis Date   • Abnormal findings on diagnostic imaging of other parts of musculoskeletal system 09/21/2016    Abnormal bone radiograph   • BPH (benign prostatic hyperplasia)    • CKD (chronic kidney disease)    • Cutaneous abscess of neck 09/23/2021    Neck abscess   • Cyst of kidney, acquired 12/01/2015    Renal cyst   • Depression    • GERD (gastroesophageal reflux disease)    • Hypertension    • Old myocardial infarction     History of myocardial infarction   • Old myocardial infarction 06/06/2013    Past myocardial infarction   • Personal history of other diseases of the circulatory system     History of coronary artery disease   • Personal history of other diseases of the  circulatory system     History of high blood pressure   • Personal history of other diseases of the digestive system     History of gastroesophageal reflux (GERD)   • Personal history of other diseases of the digestive system     History of hiatal hernia   • Personal history of other diseases of urinary system     History of kidney disease   • Personal history of other mental and behavioral disorders     History of depression   • Personal history of other specified conditions 09/02/2016    History of abdominal pain   • Personal history of other specified conditions     History of chest pain   • Personal history of other specified conditions     H/O shortness of breath   • Prostate cancer (Multi)    • Trigeminal neuralgia      Past Surgical History:   Procedure Laterality Date   • GASTRIC FUNDOPLICATION  02/25/2015    Esophagogastric Fundoplasty Nissen Fundoplication   • OTHER SURGICAL HISTORY  06/11/2013    Wrist Surgery   • OTHER SURGICAL HISTORY  09/23/2021    Neck surgery   • OTHER SURGICAL HISTORY  09/23/2021    Transurethral resection of prostate   • PROSTATE SURGERY     • ROTATOR CUFF REPAIR  06/11/2013    Rotator Cuff Repair   • US GUIDED ABSCESS DRAIN  02/10/2017    US GUIDED ABSCESS DRAIN 2/10/2017 [facility] CLINICAL LEGACY   • US GUIDED PERCUTANEOUS PERITONEAL OR RETROPERITONEAL FLUID COLLECTION DRAINAGE  02/10/2017    US GUIDED PERCUTANEOUS PERITONEAL OR RETROPERITONEAL FLUID COLLECTION DRAINAGE 2/10/2017 [facility] CLINICAL LEGACY     Family History   Problem Relation Name Age of Onset   • Other (heart trouble) Mother     • Kidney disease Father     • Heart disease Father     • Other (hypoglycemia) Father     • Other (heart trouble) Father       Social History     Tobacco Use   • Smoking status: Never     Passive exposure: Never   • Smokeless tobacco: Never   Substance Use Topics   • Alcohol use: Yes     Comment: social        Objective  /83   Pulse 64   Temp 36.2 °C (97.2 °F)   Resp 18   Ht 1.905 m (6'  "3\")   Wt 107 kg (235 lb)   BMI 29.37 kg/m²     Neuro Exam:  Cardiac Exam: No apparent edema of b/l lower extremities  Neurological Exam:  MENTAL STATUS:   General Appearance: No distress, alert, interactive, and cooperative. Orientation was normal to time, place and person. Recent and remote memory was intact.     CRANIAL NERVES:   CN 2         Visual fields full to confrontation.   CN 3, 4, 6   Pupils round, 4 mm in diameter, equally reactive to light. Lids symmetric; no ptosis. EOMs normal alignment, full range with normal saccades, pursuit and convergence.   No nystagmus.   CN 5   Facial sensation intact bilaterally.   CN 7   Normal and symmetric facial strength. Nasolabial folds symmetric.   CN 8   Hearing intact to conversation and finger rub.  CN 9, 10   Palate elevates symmetrically.  CN 11   Normal strength of shoulder shrug and neck turning.   CN 12   Tongue midline, with normal bulk and strength; no fasciculations.     MOTOR:   Muscle bulk and tone were normal in both upper and lower extremities.   No pronator drift bilaterally.  No fasciculations, tremor or other abnormal movements evident with the patient examined clothed.    STRENGTH:  R  L  Deltoid            5          5  Biceps  5 5  Triceps  5 5    Hip flexion 5 5  Knee Flex 5 5  Knee Ex 5 5    REFLEXES: R L  Biceps  2 2     BR                    2          2  Triceps  2 2  Patellar  2 2     SENSORY:   In both upper and lower extremities, sensation was intact to light touch.    COORDINATION:   In both upper extremities, finger-nose-finger was intact without dysmetria or overshoot. Mild intention tremor present in bl UE.    GAIT:   Station was stable with a normal base. Gait was stable with a normal arm swing and speed. Not able to tandem walk due to imbalance. No ataxia, shuffling, steppage or waddling was present. No circumduction was present.     Assessment/Plan    Adebayo Puentes is a 74 y.o. year old male who presents with chief complaint of " facial pain, referred by Dr Lo. He has medical hx including trigeminal neuralgia s/ GK in 2021, prostate cancer, GERD s/p Nissen fundoplication in 1986 s/p esophagoscopy and dilation to 32mm on 10/17/24, dysphonia and vocal cord atrophy (follows with ENT), HTN, CAD, and CKD. He was diagnosed with trigeminal neuralgia on R side (predominantly V3) in 2021, s/p gamma knife radiosurgery, with near complete relief of pain. He now has recurrence of pain on R side, affecting V1, V2, and V3. Pain is unrelieved by carbamazepine and gabapentin.     - MRI brain w/wo contrast  - Will transition from carbamazepine to oxcarb (better side effect profile)  - Plan for immediate switch from 300mg carbamazepine in AM and 400mg carbamazepine in PM --> 300mg oxcarb BID (using 150mg pills to allow for uptitration if needed)    Nicole Calderon MD   PGY-3 Neurology      To review what we discussed today   Assessment/Plan  Problem List Items Addressed This Visit       Trigeminal neuralgia - Primary     Switch from carbamazepine to trileptal. 300 Am and 450 pm If you need a higher dose feel free to add an extra 150 mg pill.   Call me on Monday to report how weekend went.   [phone]  Continue Gabapentin  Follow  up with Dr Riddle    Mri of brain with and without             Relevant Medications    OXcarbazepine (Trileptal) 150 mg tablet    Other Relevant Orders    Referral to Neurosurgery       Your next appointment with me is Visit date not found.    Thank you for visiting the office of Dr Annie José. It was a pleasure working with you today.   [address] Mon-Thursday  [facility] [address] [facility] Fridays  Our office phone number is [phone]. You can use this number to leave messages for Avis or to schedule or reschedule an appointment or request refills.  If you were seen on Thursday afternoon or Friday our office will call on Monday or Tuesday to reschedule your appointment. If you do not receive a call  please call us.   We are also available for messages on my chart. We make every effort to respond to your concerns by the end of the next business day.

## 2025-04-11 NOTE — PROGRESS NOTES
Subjective     Chief Complaint: Facial Pain    Adebayo Puentes is a 74 y.o. year old male who presents with chief complaint of facial pain, referred by Dr Lo. He has medical hx including trigeminal neuralgia, prostate cancer, GERD s/p Nissen fundoplication in 1986 s/p esophagoscopy and dilation to 32mm on 10/17/24, dysphonia and vocal cord atrophy (follows with ENT), HTN, CAD, and CKD.    Adebayo started getting facial pain in 4/2021. The pain was originally in his R jawbone/cheek/teeth, described as hot water and knife-like pain (4-5 to 8-9/10 severity), with episodes lasting up to 15-20 minutes. Pain was triggered by chewing and brushing teeth. No relief with tylenol. He originally thought this was dental related, saw desists with no issues, had a bite guard made, no relief. Eventually diagnosed with trigeminal neuralgia after a few months. He had tried pregabalin for the pain. He had gamma knife radiosurgery to R trigeminal nerve in 10/2021 with excellent outcome, improving frequency and severity of pain. He was then stable on carbamazepine 100mg daily with rare pain episodes. Pregabalin was stopped d/t nephrology preference.    His pain recurred around 11/2024. The pain is now worse (12/10 severity), with hot water/knife like pain from L upper lip/nose radiating up to R eye, and R jaw and teeth. Episodes last up to 1-2 hours. Episodes are occurring daily. Pain is also accompanied by significant watery eyes (R>L) and rhinorrhea. He has not noticed any redness in his eyes (but has not looked). This is making it very difficult to eat, talk, brush teeth, and some pain episodes are triggered by moving his head. Also triggered by sneezing, coughing, or blowing his nose. Pain tends to occur more so in the evening time. Pain sometimes wakes him up from sleep.     He is now taking carbamazepine 300mg in AM and 400mg in PM, in addition to gabapentin 200mg BID (had to decrease dose d/t imbalance). He also tried Prednisone  15mg daily for 7 days in early March 2025, with no relief/difference in pain.     Last MRI brain w/wo con was done 10/2021 showed tortuous vertebrobasilar system with tortuous basilar artery contacting the cisternal segment of R trigeminal nerve with asymmetric diminished caliber of R trigeminal nerve.    Other medications:  Amlodipine  ASA  Atenolol  Atorvastatin  Tegretol  Escitalopram  Esomeprazole  Gabapentin  Losartan/hydrochlorothiazide  Montelukast  Myrbetriq  Trazadone     Labs:  CrCl 74ml/min based on BMP from 3/6/25.    ROS: As per HPI, otherwise all other systems have been reviewed are negative for complaint.     Past Medical History:   Diagnosis Date    Abnormal findings on diagnostic imaging of other parts of musculoskeletal system 09/21/2016    Abnormal bone radiograph    BPH (benign prostatic hyperplasia)     CKD (chronic kidney disease)     Cutaneous abscess of neck 09/23/2021    Neck abscess    Cyst of kidney, acquired 12/01/2015    Renal cyst    Depression     GERD (gastroesophageal reflux disease)     Hypertension     Old myocardial infarction     History of myocardial infarction    Old myocardial infarction 06/06/2013    Past myocardial infarction    Personal history of other diseases of the circulatory system     History of coronary artery disease    Personal history of other diseases of the circulatory system     History of high blood pressure    Personal history of other diseases of the digestive system     History of gastroesophageal reflux (GERD)    Personal history of other diseases of the digestive system     History of hiatal hernia    Personal history of other diseases of urinary system     History of kidney disease    Personal history of other mental and behavioral disorders     History of depression    Personal history of other specified conditions 09/02/2016    History of abdominal pain    Personal history of other specified conditions     History of chest pain    Personal history of  "other specified conditions     H/O shortness of breath    Prostate cancer (Multi)     Trigeminal neuralgia      Past Surgical History:   Procedure Laterality Date    GASTRIC FUNDOPLICATION  02/25/2015    Esophagogastric Fundoplasty Nissen Fundoplication    OTHER SURGICAL HISTORY  06/11/2013    Wrist Surgery    OTHER SURGICAL HISTORY  09/23/2021    Neck surgery    OTHER SURGICAL HISTORY  09/23/2021    Transurethral resection of prostate    PROSTATE SURGERY      ROTATOR CUFF REPAIR  06/11/2013    Rotator Cuff Repair    US GUIDED ABSCESS DRAIN  02/10/2017    US GUIDED ABSCESS DRAIN 2/10/2017 [facility] LEGAC[facility]    US GUIDED PERCUTANEOUS PERITONEAL OR RETROPERITONEAL FLUID COLLECTION DRAINAGE  02/10/2017    US GUIDED PERCUTANEOUS PERITONEAL OR RETROPERITONEAL FLUID COLLECTION DRAINAGE 2/10/2017 [facility] CL[facility]ICAL [facility]GAC[facility]     Family History   Problem Relation Name Age of Onset    Other (heart trouble) Mother      Kidney disease Father      Heart disease Father      Other (hypoglycemia) Father      Other (heart trouble) Father       Social History     Tobacco Use    Smoking status: Never     Passive exposure: Never    Smokeless tobacco: Never   Substance Use Topics    Alcohol use: Yes     Comment: social        Objective   /83   Pulse 64   Temp 36.2 °C (97.2 °F)   Resp 18   Ht 1.905 m (6' 3\")   Wt 107 kg (235 lb)   BMI 29.37 kg/m²     Neuro Exam:  Cardiac Exam: No apparent edema of b/l lower extremities  Neurological Exam:  MENTAL STATUS:   General Appearance: No distress, alert, interactive, and cooperative. Orientation was normal to time, place and person. Recent and remote memory was intact.     CRANIAL NERVES:   CN 2         Visual fields full to confrontation.   CN 3, 4, 6   Pupils round, 4 mm in diameter, equally reactive to light. Lids symmetric; no ptosis. EOMs normal alignment, full range with normal saccades, pursuit and convergence.   No nystagmus.   CN 5   Facial sensation intact bilaterally.   CN 7 "   Normal and symmetric facial strength. Nasolabial folds symmetric.   CN 8   Hearing intact to conversation and finger rub.  CN 9, 10   Palate elevates symmetrically.  CN 11   Normal strength of shoulder shrug and neck turning.   CN 12   Tongue midline, with normal bulk and strength; no fasciculations.     MOTOR:   Muscle bulk and tone were normal in both upper and lower extremities.   No pronator drift bilaterally.  No fasciculations, tremor or other abnormal movements evident with the patient examined clothed.    STRENGTH:  R  L  Deltoid            5          5  Biceps  5 5  Triceps  5 5    Hip flexion 5 5  Knee Flex 5 5  Knee Ex 5 5    REFLEXES: R L  Biceps  2 2     BR                    2          2  Triceps  2 2  Patellar  2 2     SENSORY:   In both upper and lower extremities, sensation was intact to light touch.    COORDINATION:   In both upper extremities, finger-nose-finger was intact without dysmetria or overshoot. Mild intention tremor present in bl UE.    GAIT:   Station was stable with a normal base. Gait was stable with a normal arm swing and speed. Not able to tandem walk due to imbalance. No ataxia, shuffling, steppage or waddling was present. No circumduction was present.     Assessment/Plan     Adebayo Puentes is a 74 y.o. year old male who presents with chief complaint of facial pain, referred by Dr Lo. He has medical hx including trigeminal neuralgia s/ GK in 2021, prostate cancer, GERD s/p Nissen fundoplication in 1986 s/p esophagoscopy and dilation to 32mm on 10/17/24, dysphonia and vocal cord atrophy (follows with ENT), HTN, CAD, and CKD. He was diagnosed with trigeminal neuralgia on R side (predominantly V3) in 2021, s/p gamma knife radiosurgery, with near complete relief of pain. He now has recurrence of pain on R side, affecting V1, V2, and V3. Pain is unrelieved by carbamazepine and gabapentin.     - MRI brain w/wo contrast  - Will transition from carbamazepine to oxcarb (better side  effect profile)  - Plan for immediate switch from 300mg carbamazepine in AM and 400mg carbamazepine in PM --> 300mg oxcarb BID (using 150mg pills to allow for uptitration if needed)    Nicole Calderon MD   PGY-3 Neurology      To review what we discussed today   Assessment/Plan   Problem List Items Addressed This Visit       Trigeminal neuralgia - Primary     Switch from carbamazepine to trileptal. 300 Am and 450 pm If you need a higher dose feel free to add an extra 150 mg pill.   Call me on Monday to report how weekend went.   [phone]  Continue Gabapentin  Follow  up with Dr Riddle    Mri of brain with and without             Relevant Medications    OXcarbazepine (Trileptal) 150 mg tablet    Other Relevant Orders    Referral to Neurosurgery       Your next appointment with me is Visit date not found.    Thank you for visiting the office of Dr Annie José. It was a pleasure working with you today.   [address] Mon-Thursday  [facility] Fridays  Our office phone number is [phone]. You can use this number to leave messages for Avis or to schedule or reschedule an appointment or request refills.  If you were seen on Thursday afternoon or Friday our office will call on Monday or Tuesday to reschedule your appointment. If you do not receive a call please call us.   We are also available for messages on my chart. We make every effort to respond to your concerns by the end of the next business day.

## 2025-04-22 ENCOUNTER — HOSPITAL ENCOUNTER (OUTPATIENT)
Dept: RADIOLOGY | Facility: CLINIC | Age: 75
Discharge: HOME | End: 2025-04-22
Payer: MEDICARE

## 2025-04-22 DIAGNOSIS — G50.0 TRIGEMINAL NEURALGIA: ICD-10-CM

## 2025-04-22 PROCEDURE — 70553 MRI BRAIN STEM W/O & W/DYE: CPT | Performed by: RADIOLOGY

## 2025-04-22 PROCEDURE — A9575 INJ GADOTERATE MEGLUMI 0.1ML: HCPCS | Mod: JZ | Performed by: PSYCHIATRY & NEUROLOGY

## 2025-04-22 PROCEDURE — 70553 MRI BRAIN STEM W/O & W/DYE: CPT

## 2025-04-22 PROCEDURE — 2550000001 HC RX 255 CONTRASTS: Mod: JZ | Performed by: PSYCHIATRY & NEUROLOGY

## 2025-04-22 RX ORDER — GADOTERATE MEGLUMINE 376.9 MG/ML
20 INJECTION INTRAVENOUS
Status: COMPLETED | OUTPATIENT
Start: 2025-04-22 | End: 2025-04-22

## 2025-04-22 RX ADMIN — GADOTERATE MEGLUMINE 20 ML: 376.9 INJECTION INTRAVENOUS at 12:43

## 2025-04-23 ENCOUNTER — OFFICE VISIT (OUTPATIENT)
Dept: NEUROSURGERY | Facility: HOSPITAL | Age: 75
End: 2025-04-23
Payer: MEDICARE

## 2025-04-23 VITALS
DIASTOLIC BLOOD PRESSURE: 75 MMHG | OXYGEN SATURATION: 97 % | BODY MASS INDEX: 31.15 KG/M2 | HEART RATE: 50 BPM | TEMPERATURE: 97.3 F | WEIGHT: 230 LBS | HEIGHT: 72 IN | SYSTOLIC BLOOD PRESSURE: 127 MMHG

## 2025-04-23 DIAGNOSIS — G50.0 TRIGEMINAL NEURALGIA: ICD-10-CM

## 2025-04-23 PROCEDURE — 1159F MED LIST DOCD IN RCRD: CPT | Performed by: NEUROLOGICAL SURGERY

## 2025-04-23 PROCEDURE — 99214 OFFICE O/P EST MOD 30 MIN: CPT | Performed by: NEUROLOGICAL SURGERY

## 2025-04-23 PROCEDURE — 1125F AMNT PAIN NOTED PAIN PRSNT: CPT | Performed by: NEUROLOGICAL SURGERY

## 2025-04-23 PROCEDURE — 99204 OFFICE O/P NEW MOD 45 MIN: CPT | Performed by: NEUROLOGICAL SURGERY

## 2025-04-23 PROCEDURE — 1123F ACP DISCUSS/DSCN MKR DOCD: CPT | Performed by: NEUROLOGICAL SURGERY

## 2025-04-23 PROCEDURE — 3078F DIAST BP <80 MM HG: CPT | Performed by: NEUROLOGICAL SURGERY

## 2025-04-23 PROCEDURE — 3074F SYST BP LT 130 MM HG: CPT | Performed by: NEUROLOGICAL SURGERY

## 2025-04-23 ASSESSMENT — PATIENT HEALTH QUESTIONNAIRE - PHQ9
1. LITTLE INTEREST OR PLEASURE IN DOING THINGS: NOT AT ALL
8. MOVING OR SPEAKING SO SLOWLY THAT OTHER PEOPLE COULD HAVE NOTICED. OR THE OPPOSITE, BEING SO FIGETY OR RESTLESS THAT YOU HAVE BEEN MOVING AROUND A LOT MORE THAN USUAL: NOT AT ALL
SUM OF ALL RESPONSES TO PHQ9 QUESTIONS 1 AND 2: 0
3. TROUBLE FALLING OR STAYING ASLEEP OR SLEEPING TOO MUCH: NEARLY EVERY DAY
1. LITTLE INTEREST OR PLEASURE IN DOING THINGS: NEARLY EVERY DAY
6. FEELING BAD ABOUT YOURSELF - OR THAT YOU ARE A FAILURE OR HAVE LET YOURSELF OR YOUR FAMILY DOWN: NOT AT ALL
SUM OF ALL RESPONSES TO PHQ9 QUESTIONS 1 AND 2: 3
2. FEELING DOWN, DEPRESSED OR HOPELESS: NOT AT ALL
5. POOR APPETITE OR OVEREATING: NOT AT ALL
10. IF YOU CHECKED OFF ANY PROBLEMS, HOW DIFFICULT HAVE THESE PROBLEMS MADE IT FOR YOU TO DO YOUR WORK, TAKE CARE OF THINGS AT HOME, OR GET ALONG WITH OTHER PEOPLE: SOMEWHAT DIFFICULT
9. THOUGHTS THAT YOU WOULD BE BETTER OFF DEAD, OR OF HURTING YOURSELF: NOT AT ALL
4. FEELING TIRED OR HAVING LITTLE ENERGY: NEARLY EVERY DAY
2. FEELING DOWN, DEPRESSED OR HOPELESS: NOT AT ALL
SUM OF ALL RESPONSES TO PHQ QUESTIONS 1-9: 12
7. TROUBLE CONCENTRATING ON THINGS, SUCH AS READING THE NEWSPAPER OR WATCHING TELEVISION: NEARLY EVERY DAY

## 2025-04-23 ASSESSMENT — COLUMBIA-SUICIDE SEVERITY RATING SCALE - C-SSRS
6. HAVE YOU EVER DONE ANYTHING, STARTED TO DO ANYTHING, OR PREPARED TO DO ANYTHING TO END YOUR LIFE?: NO
2. HAVE YOU ACTUALLY HAD ANY THOUGHTS OF KILLING YOURSELF?: NO
1. IN THE PAST MONTH, HAVE YOU WISHED YOU WERE DEAD OR WISHED YOU COULD GO TO SLEEP AND NOT WAKE UP?: NO

## 2025-04-23 ASSESSMENT — ENCOUNTER SYMPTOMS
LOSS OF SENSATION IN FEET: 0
OCCASIONAL FEELINGS OF UNSTEADINESS: 1

## 2025-04-23 ASSESSMENT — PAIN SCALES - GENERAL: PAINLEVEL_OUTOF10: 6

## 2025-04-23 NOTE — PROGRESS NOTES
"[facility]   Neurosurgery    Diagnosis  Adebayo \"Ed\" was seen today for new patient visit.  Diagnoses and all orders for this visit:  Trigeminal neuralgia  -     Referral to Neurosurgery      Patient Discussion/Summary  Ed has idiopathic trigeminal neuralgia affecting the right V1 through V3 distributions.  This pain is described as a shock-like, shooting and lancinating pain, with a concurrent burning sensation.  He has clear triggers.  It is responded previously to carbamazepine and oxcarbazepine.    He previously underwent gamma knife radiosurgery in 2021 with excellent pain relief.  At that time, he was maintained on carbamazepine with very rare episodes.  His pain then recurred around 2024.  He has since been followed by Dr. José.    Imaging demonstrates compression by both the basilar artery as well as AICA.  The right nerve appears atrophied.    We explained that the best treatment for vascular compression is with microvascular decompression.  The challenge in his case is predicting efficacy and durability, particularly as he has such severe compression from a tortuous basilar artery.  Given that he also has an element of burning neuropathic pain, likely due to nerve damage and atrophy, this particular pain may not improve.  Regardless, there is a chance for significant improvement with microvascular decompression, and this would be recommended over repeat radiosurgery or percutaneous lesioning at this time.    As such, he is a good candidate for a right-sided microvascular decompression.  We will insert a lumbar drain at the time of surgery to aid in CSF drainage.    The procedure was discussed in detail and all of their questions were answered.  We also discussed the risks of the procedure including, but not limited to the risks of bleeding, infection, CSF leak, hardware malfunction, and neurological injury including, but not limited to increased pain, numbness, weakness, paralysis or stroke.  The " risks of an anesthetic including cardiorespiratory compromise, coma or death were touched upon, and will be discussed in greater detail by the anesthesiologist.      History of Present Illness  Chief Complaint:   Chief Complaint   Patient presents with    New Patient Visit         HPI: Adebayo Puentes is a 74 y.o. male with hx including trigeminal neuralgia, prostate cancer, GERD s/p Nissen fundoplication in 1986 s/p esophagoscopy and dilation to 32mm on 10/17/24, dysphonia and vocal cord atrophy (follows with ENT), HTN, CAD, and CKD. He was diagnosed with trigeminal neuralgia on R side (predominantly V3) in 2021, s/p gamma knife radiosurgery, with near complete relief of pain.      He recently followed up with Dr. José for recurrence of pain on R side, affecting V1, V2, and V3. States pain is unrelieved by carbamazepine and gabapentin.   Right eyelid to top of ear, then down jaw to the top of right lip, then to bottom of jaw into teeth. He has since stopped carbamazepine and started Oxcarbazapine on 4/14. He feels like the medication is helping some symptoms and not so much the others. He has been up all night in pain. Pain is gums and lips are numb and tingling, tongue is sore, has shooting, stabbing, burning. Has time when he doesn't have pain at all--once a week. The morning are worse. Pain is triggered by touch of face, shaving, talking, eating and chewing. Cold temps can some times trigger pain.     MRI brain w/wo contrast ordered & discussed transitioning from carbamazepine to oxcarbazepine. Patient presents today as a referral from Dr. José for evaluation of facial pain.         ROS: As noted in HPI.      Previous History  Medical History[1]  Surgical History[2]  Social History[3]  Family History[4]  Allergies[5]  Current Outpatient Medications   Medication Instructions    amLODIPine (NORVASC) 10 mg, oral, Daily    ascorbic acid, vitamin C, 1,000 mg ER tablet Take by mouth.    aspirin 81 mg, Daily    atenolol  "(TENORMIN) 25 mg, oral, Daily    atorvastatin (LIPITOR) 40 mg, oral, Daily    calcium carbonate-vitamin D3 (Caltrate with Vitamin D3) 600 mg-20 mcg (800 unit) tablet 2 times daily    carBAMazepine (TEGretol) 100 mg chewable tablet Take 300 mg in the AM, and 400 mg at night    cholecalciferol (Vitamin D3) 5,000 Units tablet 1 tablet, Once Weekly    escitalopram (LEXAPRO) 20 mg, oral, Daily    esomeprazole (NEXIUM) 20 mg, oral, 2 times daily, Do not open capsule.    gabapentin (NEURONTIN) 200 mg, oral, 2 times daily    losartan-hydrochlorothiazide (Hyzaar) 100-25 mg tablet 1 tablet, oral, Daily    montelukast (SINGULAIR) 10 mg, oral, Daily    multivitamin with minerals iron-free (Centrum Silver) 1 tablet    Myrbetriq 50 mg, oral, Daily    OXcarbazepine (Trileptal) 150 mg tablet 2 in am and 3 at pm.    traZODone (DESYREL) 100 mg, oral, Nightly         Vitals  /75   Pulse 50   Temp 36.3 °C (97.3 °F) (Temporal)   Ht 1.823 m (5' 11.77\")   Wt 104 kg (230 lb)   SpO2 97%   BMI 31.39 kg/m²       Physical Exam  Constitutional: Well appearing. No acute distress.   Musculoskeletal: No visible deformity of joints and nails. Normal ROM.  Orientation: Oriented to self, place, and time  Language: Fluid speech and intact cognition  CN 2: Normal   CN 3, 4, and 6: Normal   CN 5: Trigger trigeminal pain with light touch of right side of face.  CN 7: Normal   CN 8: Normal   CN 9 and 10: Normal   CN 11: Normal   CN 12: Normal   Muscle strength: 5/5 strength both UE and LE.  Muscle tone: No atrophy, abnormal movements, flaccidity, cogwheeling or spasticity.   Gait and station: Non-antalgic and not broad-based. No shuffling steps.  Sensation: Grossly intact throughout all dermatomes. No allodynia.   Reflexes: Symmetric and normal deep tendon reflexes throughout. Negative Gutierrez's sign. No clonus at ankles.    Coordination: No dysmetria.  Mood and Affect: Appropriate mood and affect.     Results  An MRI of the brain from April " 2025 was reviewed.  This demonstrates compression of the right trigeminal nerve by both a tortuous basilar artery as well as the right AICA.  The right trigeminal nerve appears atrophied compared to the left.         [1]   Past Medical History:  Diagnosis Date    Abnormal findings on diagnostic imaging of other parts of musculoskeletal system 09/21/2016    Abnormal bone radiograph    BPH (benign prostatic hyperplasia)     CKD (chronic kidney disease)     Cutaneous abscess of neck 09/23/2021    Neck abscess    Cyst of kidney, acquired 12/01/2015    Renal cyst    Depression     GERD (gastroesophageal reflux disease)     Hypertension     Old myocardial infarction     History of myocardial infarction    Old myocardial infarction 06/06/2013    Past myocardial infarction    Personal history of other diseases of the circulatory system     History of coronary artery disease    Personal history of other diseases of the circulatory system     History of high blood pressure    Personal history of other diseases of the digestive system     History of gastroesophageal reflux (GERD)    Personal history of other diseases of the digestive system     History of hiatal hernia    Personal history of other diseases of urinary system     History of kidney disease    Personal history of other mental and behavioral disorders     History of depression    Personal history of other specified conditions 09/02/2016    History of abdominal pain    Personal history of other specified conditions     History of chest pain    Personal history of other specified conditions     H/O shortness of breath    Prostate cancer (Multi)     Trigeminal neuralgia    [2]   Past Surgical History:  Procedure Laterality Date    GASTRIC FUNDOPLICATION  02/25/2015    Esophagogastric Fundoplasty Nissen Fundoplication    OTHER SURGICAL HISTORY  06/11/2013    Wrist Surgery    OTHER SURGICAL HISTORY  09/23/2021    Neck surgery    OTHER SURGICAL HISTORY  09/23/2021     Transurethral resection of prostate    PROSTATE SURGERY      ROTATOR CUFF REPAIR  06/11/2013    Rotator Cuff Repair    US GUIDED ABSCESS DRAIN  02/10/2017    US GUIDED ABSCESS DRAIN 2/10/2017 [facility] CLINICAL LEGACY    US GUIDED PERCUTANEOUS PERITONEAL OR RETROPERITONEAL FLUID COLLECTION DRAINAGE  02/10/2017    US GUIDED PERCUTANEOUS PERITONEAL OR RETROPERITONEAL FLUID COLLECTION DRAINAGE 2/10/2017 [facility] CLINICAL LEGACY   [3]   Social History  Tobacco Use    Smoking status: Never     Passive exposure: Never    Smokeless tobacco: Never   Vaping Use    Vaping status: Never Used   Substance Use Topics    Alcohol use: Yes     Comment: social    Drug use: Never   [4]   Family History  Problem Relation Name Age of Onset    Other (heart trouble) Mother      Kidney disease Father      Heart disease Father      Other (hypoglycemia) Father      Other (heart trouble) Father     [5] No Known Allergies

## 2025-04-23 NOTE — RESULT ENCOUNTER NOTE
I am sure Dr Riddle went over the MRI with you and made recommendations. I can not yet see them. If you still have pain we can definitely increase the trileptal.

## 2025-04-24 DIAGNOSIS — G50.0 TRIGEMINAL NEURALGIA: ICD-10-CM

## 2025-04-24 RX ORDER — OXCARBAZEPINE 300 MG/1
TABLET, FILM COATED ORAL
Qty: 150 TABLET | Refills: 3 | Status: SHIPPED | OUTPATIENT
Start: 2025-04-24

## 2025-04-24 RX ORDER — OXCARBAZEPINE 300 MG/1
TABLET, FILM COATED ORAL
Qty: 150 TABLET | Refills: 3 | Status: SHIPPED | OUTPATIENT
Start: 2025-04-24 | End: 2025-04-24 | Stop reason: SDUPTHER

## 2025-04-29 ENCOUNTER — APPOINTMENT (OUTPATIENT)
Dept: OTOLARYNGOLOGY | Facility: CLINIC | Age: 75
End: 2025-04-29
Payer: MEDICARE

## 2025-04-30 ENCOUNTER — APPOINTMENT (OUTPATIENT)
Dept: NEUROLOGY | Facility: HOSPITAL | Age: 75
End: 2025-04-30
Payer: MEDICARE

## 2025-05-01 ENCOUNTER — TELEPHONE (OUTPATIENT)
Dept: NEUROSURGERY | Facility: HOSPITAL | Age: 75
End: 2025-05-01
Payer: MEDICARE

## 2025-05-01 LAB
ALBUMIN SERPL-MCNC: 4.4 G/DL (ref 3.6–5.1)
BASOPHILS # BLD AUTO: 59 CELLS/UL (ref 0–200)
BASOPHILS NFR BLD AUTO: 1.2 %
BUN SERPL-MCNC: 32 MG/DL (ref 7–25)
BUN/CREAT SERPL: 22 (CALC) (ref 6–22)
CALCIUM SERPL-MCNC: 9.6 MG/DL (ref 8.6–10.3)
CHLORIDE SERPL-SCNC: 104 MMOL/L (ref 98–110)
CO2 SERPL-SCNC: 30 MMOL/L (ref 20–32)
CREAT SERPL-MCNC: 1.48 MG/DL (ref 0.7–1.28)
EGFRCR SERPLBLD CKD-EPI 2021: 49 ML/MIN/1.73M2
EOSINOPHIL # BLD AUTO: 176 CELLS/UL (ref 15–500)
EOSINOPHIL NFR BLD AUTO: 3.6 %
ERYTHROCYTE [DISTWIDTH] IN BLOOD BY AUTOMATED COUNT: 13.1 % (ref 11–15)
GLUCOSE SERPL-MCNC: 92 MG/DL (ref 65–99)
HCT VFR BLD AUTO: 38.8 % (ref 38.5–50)
HGB BLD-MCNC: 13.1 G/DL (ref 13.2–17.1)
LYMPHOCYTES # BLD AUTO: 1441 CELLS/UL (ref 850–3900)
LYMPHOCYTES NFR BLD AUTO: 29.4 %
MCH RBC QN AUTO: 32.5 PG (ref 27–33)
MCHC RBC AUTO-ENTMCNC: 33.8 G/DL (ref 32–36)
MCV RBC AUTO: 96.3 FL (ref 80–100)
MONOCYTES # BLD AUTO: 495 CELLS/UL (ref 200–950)
MONOCYTES NFR BLD AUTO: 10.1 %
NEUTROPHILS # BLD AUTO: 2729 CELLS/UL (ref 1500–7800)
NEUTROPHILS NFR BLD AUTO: 55.7 %
PHOSPHATE SERPL-MCNC: 4 MG/DL (ref 2.1–4.3)
PLATELET # BLD AUTO: 156 THOUSAND/UL (ref 140–400)
PMV BLD REES-ECKER: 10.3 FL (ref 7.5–12.5)
POTASSIUM SERPL-SCNC: 4.6 MMOL/L (ref 3.5–5.3)
RBC # BLD AUTO: 4.03 MILLION/UL (ref 4.2–5.8)
SODIUM SERPL-SCNC: 143 MMOL/L (ref 135–146)
WBC # BLD AUTO: 4.9 THOUSAND/UL (ref 3.8–10.8)

## 2025-05-01 NOTE — TELEPHONE ENCOUNTER
I called and spoke to patient to discuss surgery planning. He is now scheduled for his Right MVD with lumbar drain on 6/13 with Dr. Riddle. Cardiac clearance sent to Cardiologist as he's on 81mg ASA. He is aware of preop testing. He verbalized understanding and agrees to plan.

## 2025-05-02 LAB — PSA SERPL-MCNC: 5.04 NG/ML

## 2025-05-04 NOTE — PROGRESS NOTES
Subjective   Adebayo Puentes is a 74 y.o. male  with a history of TURP 07/2020 and PVP 05/05/2022. S/p TP prostate biopsy (9/12/23) with Dr. 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 History of elevated PSA. He presents today for an annual follow up visit. Patient reports urinary hesitancy in the morning which resolves over the course of the day, not bothersome. He is doing well overall. Denies any recent gross hematuria, fevers, chills, urinary retention, intractable flank or abdominal pain, nausea or vomiting.            Past Medical History:   Diagnosis Date    Abnormal findings on diagnostic imaging of other parts of musculoskeletal system 09/21/2016    Abnormal bone radiograph    BPH (benign prostatic hyperplasia)     CKD (chronic kidney disease)     Cutaneous abscess of neck 09/23/2021    Neck abscess    Cyst of kidney, acquired 12/01/2015    Renal cyst    Depression     GERD (gastroesophageal reflux disease)     Hypertension     Old myocardial infarction     History of myocardial infarction    Old myocardial infarction 06/06/2013    Past myocardial infarction    Personal history of other diseases of the circulatory system     History of coronary artery disease    Personal history of other diseases of the circulatory system     History of high blood pressure    Personal history of other diseases of the digestive system     History of gastroesophageal reflux (GERD)    Personal history of other diseases of the digestive system     History of hiatal hernia    Personal history of other diseases of urinary system     History of kidney disease    Personal history of other mental and behavioral disorders     History of depression    Personal history of other specified conditions 09/02/2016    History of abdominal pain    Personal history of other specified conditions     History of chest pain    Personal history of other specified conditions     H/O shortness of breath    Prostate  cancer (Multi)     Trigeminal neuralgia      Past Surgical History:   Procedure Laterality Date    GASTRIC FUNDOPLICATION  02/25/2015    Esophagogastric Fundoplasty Nissen Fundoplication    OTHER SURGICAL HISTORY  06/11/2013    Wrist Surgery    OTHER SURGICAL HISTORY  09/23/2021    Neck surgery    OTHER SURGICAL HISTORY  09/23/2021    Transurethral resection of prostate    PROSTATE SURGERY      ROTATOR CUFF REPAIR  06/11/2013    Rotator Cuff Repair    US GUIDED ABSCESS DRAIN  02/10/2017    US GUIDED ABSCESS DRAIN 2/10/2017 [facility]    US GUIDED PERCUTANEOUS PERITONEAL OR RETROPERITONEAL FLUID COLLECTION DRAINAGE  02/10/2017    US GUIDED PERCUTANEOUS PERITONEAL OR RETROPERITONEAL FLUID COLLECTION DRAINAGE 2/10/2017 [facility] CL[facility]ICAL [facility]GAC[facility]     Family History   Problem Relation Name Age of Onset    Other (heart trouble) Mother      Kidney disease Father      Heart disease Father      Other (hypoglycemia) Father      Other (heart trouble) Father       Current Outpatient Medications   Medication Sig Dispense Refill    amLODIPine (Norvasc) 10 mg tablet Take 1 tablet (10 mg) by mouth once daily. 90 tablet 3    ascorbic acid, vitamin C, 1,000 mg ER tablet Take by mouth.      atenolol (Tenormin) 25 mg tablet Take 1 tablet (25 mg) by mouth once daily. 90 tablet 3    atorvastatin (Lipitor) 40 mg tablet Take 1 tablet (40 mg) by mouth once daily. 90 tablet 3    calcium carbonate-vitamin D3 (Caltrate with Vitamin D3) 600 mg-20 mcg (800 unit) tablet Take by mouth twice a day.      carBAMazepine (TEGretol) 100 mg chewable tablet Take 300 mg in the AM, and 400 mg at night 210 tablet 11    cholecalciferol (Vitamin D3) 5,000 Units tablet Take 1 tablet (125 mcg) by mouth 1 (one) time per week.      escitalopram (Lexapro) 20 mg tablet Take 1 tablet (20 mg) by mouth once daily. 90 tablet 3    esomeprazole (NexIUM) 20 mg DR capsule Take 1 capsule (20 mg) by mouth 2 times a day. Do not open capsule. 180 capsule 3     losartan-hydrochlorothiazide (Hyzaar) 100-25 mg tablet Take 1 tablet by mouth once daily. 90 tablet 1    montelukast (Singulair) 10 mg tablet Take 1 tablet (10 mg) by mouth once daily. 90 tablet 3    multivitamin with minerals iron-free (Centrum Silver) Take 1 tablet by mouth.      Myrbetriq 50 mg tablet extended release 24 hr 24 hr tablet Take 1 tablet (50 mg) by mouth once daily. 30 tablet 11    OXcarbazepine (Trileptal) 300 mg tablet 1 in am and 2 at pm x 1 week  2 bid 150 tablet 3    traZODone (Desyrel) 100 mg tablet Take 1 tablet (100 mg) by mouth once daily at bedtime. 90 tablet 3    aspirin 81 mg EC tablet Take 1 tablet (81 mg) by mouth once daily. (Patient not taking: Reported on 4/23/2025)      gabapentin (Neurontin) 100 mg capsule Take 2 capsules (200 mg) by mouth 2 times a day. (Patient taking differently: Take 2 capsules (200 mg) by mouth 4 times a day with meals.) 360 capsule 1     No current facility-administered medications for this visit.     No Known Allergies  Social History     Socioeconomic History    Marital status:      Spouse name: Not on file    Number of children: Not on file    Years of education: Not on file    Highest education level: Not on file   Occupational History    Not on file   Tobacco Use    Smoking status: Never     Passive exposure: Never    Smokeless tobacco: Never   Vaping Use    Vaping status: Never Used   Substance and Sexual Activity    Alcohol use: Yes     Comment: social    Drug use: Never    Sexual activity: Not on file   Other Topics Concern    Not on file   Social History Narrative    Not on file     Social Drivers of Health     Financial Resource Strain: Not on file   Food Insecurity: Not on file   Transportation Needs: Not on file   Physical Activity: Not on file   Stress: Not on file   Social Connections: Not on file   Intimate Partner Violence: Not on file   Housing Stability: Not on file       Review of Systems  Pertinent items are noted in  HPI.    Objective       Lab Review  Lab Results   Component Value Date    WBC 4.9 05/01/2025    RBC 4.03 (L) 05/01/2025    HGB 13.1 (L) 05/01/2025    HCT 38.8 05/01/2025     05/01/2025      Lab Results   Component Value Date    BUN 32 (H) 05/01/2025    CREATININE 1.48 (H) 05/01/2025      Lab Results   Component Value Date    PSA 5.04 (H) 05/01/2025      ml         Assessment/Plan   Diagnoses and all orders for this visit:  Prostate cancer (Multi)  -     Referral to Urology  -     Measure post void residual  -     PSA; Future  BPH with obstruction/lower urinary tract symptoms  -     Measure post void residual      BPH s/p TURP 07/2020 and PVP 05/05/2022.    Tri has no bothersome urinary symptoms.     We will continue to monitor.    Follow up annually.     History of [place] 6 prostate cancer on active surveillance      We will order a PSA, follow up with Dr. Schmitt annually.     All questions were answered to the patient's satisfaction. Patient agrees with the plan and wishes to proceed. Follow-up will be scheduled appropriately.     E&M visit today is associated with current or anticipated ongoing medical care services related to a patient's single, serious condition or a complex condition.    Scribed for Dr. Fuentes by Anais Banda. I , Dr Fuentes, have personally reviewed and agreed with the information entered by the Virtual Scribe.      Statement Selected

## 2025-05-06 ENCOUNTER — APPOINTMENT (OUTPATIENT)
Dept: UROLOGY | Facility: CLINIC | Age: 75
End: 2025-05-06
Payer: MEDICARE

## 2025-05-06 VITALS — TEMPERATURE: 97 F | BODY MASS INDEX: 30.34 KG/M2 | HEIGHT: 73 IN

## 2025-05-06 DIAGNOSIS — N40.1 BPH WITH OBSTRUCTION/LOWER URINARY TRACT SYMPTOMS: ICD-10-CM

## 2025-05-06 DIAGNOSIS — C61 PROSTATE CANCER (MULTI): Primary | ICD-10-CM

## 2025-05-06 DIAGNOSIS — N13.8 BPH WITH OBSTRUCTION/LOWER URINARY TRACT SYMPTOMS: ICD-10-CM

## 2025-05-06 PROCEDURE — G2211 COMPLEX E/M VISIT ADD ON: HCPCS | Performed by: UROLOGY

## 2025-05-06 PROCEDURE — 99213 OFFICE O/P EST LOW 20 MIN: CPT | Performed by: UROLOGY

## 2025-05-06 PROCEDURE — 1159F MED LIST DOCD IN RCRD: CPT | Performed by: UROLOGY

## 2025-05-06 PROCEDURE — 1036F TOBACCO NON-USER: CPT | Performed by: UROLOGY

## 2025-05-06 PROCEDURE — 1126F AMNT PAIN NOTED NONE PRSNT: CPT | Performed by: UROLOGY

## 2025-05-06 PROCEDURE — 51798 US URINE CAPACITY MEASURE: CPT | Performed by: UROLOGY

## 2025-05-06 PROCEDURE — 1123F ACP DISCUSS/DSCN MKR DOCD: CPT | Performed by: UROLOGY

## 2025-05-06 ASSESSMENT — PAIN SCALES - GENERAL: PAINLEVEL_OUTOF10: 0-NO PAIN

## 2025-05-08 ENCOUNTER — PREP FOR PROCEDURE (OUTPATIENT)
Dept: NEUROSURGERY | Facility: HOSPITAL | Age: 75
End: 2025-05-08
Payer: MEDICARE

## 2025-05-08 DIAGNOSIS — G50.0 TRIGEMINAL NEURALGIA: Primary | ICD-10-CM

## 2025-05-09 ENCOUNTER — APPOINTMENT (OUTPATIENT)
Dept: NEPHROLOGY | Facility: CLINIC | Age: 75
End: 2025-05-09
Payer: MEDICARE

## 2025-05-09 VITALS
DIASTOLIC BLOOD PRESSURE: 57 MMHG | HEART RATE: 59 BPM | OXYGEN SATURATION: 96 % | TEMPERATURE: 98.5 F | SYSTOLIC BLOOD PRESSURE: 103 MMHG

## 2025-05-09 DIAGNOSIS — I10 ESSENTIAL HYPERTENSION: ICD-10-CM

## 2025-05-09 DIAGNOSIS — N40.1 BPH WITH OBSTRUCTION/LOWER URINARY TRACT SYMPTOMS: Primary | ICD-10-CM

## 2025-05-09 DIAGNOSIS — N13.8 BPH WITH OBSTRUCTION/LOWER URINARY TRACT SYMPTOMS: Primary | ICD-10-CM

## 2025-05-09 DIAGNOSIS — N18.31 STAGE 3A CHRONIC KIDNEY DISEASE (MULTI): ICD-10-CM

## 2025-05-09 PROCEDURE — 99213 OFFICE O/P EST LOW 20 MIN: CPT | Performed by: NURSE PRACTITIONER

## 2025-05-09 PROCEDURE — 3074F SYST BP LT 130 MM HG: CPT | Performed by: NURSE PRACTITIONER

## 2025-05-09 PROCEDURE — 3078F DIAST BP <80 MM HG: CPT | Performed by: NURSE PRACTITIONER

## 2025-05-09 PROCEDURE — 1159F MED LIST DOCD IN RCRD: CPT | Performed by: NURSE PRACTITIONER

## 2025-05-09 ASSESSMENT — ENCOUNTER SYMPTOMS
CONSTITUTIONAL NEGATIVE: 1
HEMATOLOGIC/LYMPHATIC NEGATIVE: 1
EYES NEGATIVE: 1
ENDOCRINE NEGATIVE: 1
NEUROLOGICAL NEGATIVE: 1
MUSCULOSKELETAL NEGATIVE: 1
PSYCHIATRIC NEGATIVE: 1
CARDIOVASCULAR NEGATIVE: 1
GASTROINTESTINAL NEGATIVE: 1
RESPIRATORY NEGATIVE: 1

## 2025-05-09 NOTE — PROGRESS NOTES
History Of Present Illness  Ed TATYANA Puentes is a 74 y.o. male with medical history significant for CKD3, CAD, HTN, HLD, GERD, BPH s/p TURP (07/2020), prostate Ca on surveillance, and trigeminal neuralgia who presents for a 6-month fuv for CKD. Last visit was with Dr. Yang on 11/13/24.     Past Medical History  As above.    Surgical History  He has a past surgical history that includes Other surgical history (06/11/2013); Rotator cuff repair (06/11/2013); Other surgical history (09/23/2021); Other surgical history (09/23/2021); Gastric fundoplication (02/25/2015); US guided percutaneous peritoneal or retroperitoneal fluid collection drainage (02/10/2017); US guided abscess drain (02/10/2017); and Prostate surgery.     Social History  He reports that he has never smoked. He has never been exposed to tobacco smoke. He has never used smokeless tobacco. He reports current alcohol use. He reports that he does not use drugs.    Family History  Family History[1]     Allergies  Patient has no known allergies.    Review of Systems   Constitutional: Negative.    HENT: Negative.     Eyes: Negative.    Respiratory: Negative.     Cardiovascular: Negative.    Gastrointestinal: Negative.    Endocrine: Negative.    Genitourinary: Negative.    Musculoskeletal: Negative.    Skin: Negative.    Neurological: Negative.    Hematological: Negative.    Psychiatric/Behavioral: Negative.          Physical Exam  Vitals reviewed.   Constitutional:       Appearance: Normal appearance.   HENT:      Mouth/Throat:      Mouth: Mucous membranes are moist.   Cardiovascular:      Rate and Rhythm: Normal rate and regular rhythm.      Pulses: Normal pulses.      Heart sounds: Normal heart sounds.   Pulmonary:      Effort: Pulmonary effort is normal.      Breath sounds: Normal breath sounds.   Musculoskeletal:         General: Normal range of motion.   Skin:     General: Skin is warm and dry.   Neurological:      General: No focal deficit present.       Mental Status: He is alert and oriented to person, place, and time.   Psychiatric:         Mood and Affect: Mood normal.         Behavior: Behavior normal.         Thought Content: Thought content normal.         Judgment: Judgment normal.          Last Recorded Vitals  Blood pressure 103/57, pulse 59, temperature 36.9 °C (98.5 °F), temperature source Temporal, SpO2 96%.    Relevant Results  Recent Results (from the past 2 weeks)   Prostate Specific Antigen    Collection Time: 05/01/25  7:15 AM   Result Value Ref Range    PSA, TOTAL 5.04 (H) < OR = 4.00 ng/mL   Renal Function Panel    Collection Time: 05/01/25  7:18 AM   Result Value Ref Range    GLUCOSE 92 65 - 99 mg/dL    UREA NITROGEN (BUN) 32 (H) 7 - 25 mg/dL    CREATININE 1.48 (H) 0.70 - 1.28 mg/dL    EGFR 49 (L) > OR = 60 mL/min/1.73m2    BUN/CREATININE RATIO 22 6 - 22 (calc)    SODIUM 143 135 - 146 mmol/L    POTASSIUM 4.6 3.5 - 5.3 mmol/L    CHLORIDE 104 98 - 110 mmol/L    CARBON DIOXIDE 30 20 - 32 mmol/L    CALCIUM 9.6 8.6 - 10.3 mg/dL    PHOSPHATE (AS PHOSPHORUS) 4.0 2.1 - 4.3 mg/dL    ALBUMIN 4.4 3.6 - 5.1 g/dL   CBC and Auto Differential    Collection Time: 05/01/25  7:18 AM   Result Value Ref Range    WHITE BLOOD CELL COUNT 4.9 3.8 - 10.8 Thousand/uL    RED BLOOD CELL COUNT 4.03 (L) 4.20 - 5.80 Million/uL    HEMOGLOBIN 13.1 (L) 13.2 - 17.1 g/dL    HEMATOCRIT 38.8 38.5 - 50.0 %    MCV 96.3 80.0 - 100.0 fL    MCH 32.5 27.0 - 33.0 pg    MCHC 33.8 32.0 - 36.0 g/dL    RDW 13.1 11.0 - 15.0 %    PLATELET COUNT 156 140 - 400 Thousand/uL    MPV 10.3 7.5 - 12.5 fL    ABSOLUTE NEUTROPHILS 2,729 1,500 - 7,800 cells/uL    ABSOLUTE LYMPHOCYTES 1,441 850 - 3,900 cells/uL    ABSOLUTE MONOCYTES 495 200 - 950 cells/uL    ABSOLUTE EOSINOPHILS 176 15 - 500 cells/uL    ABSOLUTE BASOPHILS 59 0 - 200 cells/uL    NEUTROPHILS 55.7 %    LYMPHOCYTES 29.4 %    MONOCYTES 10.1 %    EOSINOPHILS 3.6 %    BASOPHILS 1.2 %         Assessment/Plan     74 y.o. male with medical history  significant for CKD3, CAD, HTN, HLD, GERD, BPH s/p TURP (2020), prostate Ca on surveillance, and trigeminal neuralgia who presents for a 6-month fuv for CKD. Last visit was with Dr. Yang on 24.    # CKD3: baseline sCr 1.3 - 1.7 mg/dL with eGFR 42 - 57 ml/min/1.73m2 since at least 2019. Non-albuminuric. Renal US (3/6/20) showed a small stable cyst in the lower pole of the right kidney otherwise unremarkable. Likely due to hypertensive kidney disease. Most recent sCr 1.48 mg/dL with eGFR 49 ml/min/1.73m2 (25) - at baseline. Currently stable.    # HTN: well controlled with current regimen.    # BHP: s/p TURP (2020). Follows up regularly with urology.    Plan:  - Continue to follow up with PCP for optimal HTN management. Goal /80 mmHg for renal protection.   - Continue to follow up with urology for optimal BPH management..  - No changes with current medications.  - Reviewed strategies for preserving remaining kidney function includin) Avoidance of NSAIDs including Aleve (Naprosyn), Motrin (Ibuprofen), Mobic (Meloxicam), Celebrex (Celecoxib) and aspirin as well as PPI acid blocking medications such as Prilosec (omeprazole), Protonix (pantoprazole), and Nexium (esomeprazole), as well as other nephrotoxic agents.    2) Avoidance of tobacco or alcohol use.    3) Adequate hydration daily.    4) Blood pressure target 130/80 mmHg.    5) Daily dietary sodium intake less than 2 grams per day.     6) Maintain healthy lifestyle. Healthy diet and regular exercises.    7) Maintain ideal weight.   - FUV: in 6 months or sooner if concerns arise.      Patient verbalized understanding of above. He will not hesitate to contact Division of Nephrology at [phone] with concerns/questions.     I spent 25 minutes in the professional and overall care of this patient.      Hamzah Chino, APRN-CNP         [1]   Family History  Problem Relation Name Age of Onset    Other (heart trouble) Mother      Kidney  disease Father      Heart disease Father      Other (hypoglycemia) Father      Other (heart trouble) Father

## 2025-05-12 ENCOUNTER — PATIENT MESSAGE (OUTPATIENT)
Dept: NEUROLOGY | Facility: HOSPITAL | Age: 75
End: 2025-05-12
Payer: MEDICARE

## 2025-05-12 DIAGNOSIS — G50.0 TRIGEMINAL NEURALGIA: ICD-10-CM

## 2025-05-15 ENCOUNTER — TELEPHONE (OUTPATIENT)
Dept: NEUROLOGY | Facility: CLINIC | Age: 75
End: 2025-05-15
Payer: MEDICARE

## 2025-05-15 DIAGNOSIS — G50.0 TRIGEMINAL NEURALGIA: ICD-10-CM

## 2025-05-15 DIAGNOSIS — Z79.899 LONG-TERM USE OF HIGH-RISK MEDICATION: ICD-10-CM

## 2025-05-15 RX ORDER — GABAPENTIN 100 MG/1
200 CAPSULE ORAL 2 TIMES DAILY
Qty: 360 CAPSULE | Refills: 1 | Status: SHIPPED | OUTPATIENT
Start: 2025-05-15

## 2025-05-15 RX ORDER — OXCARBAZEPINE 300 MG/1
TABLET, FILM COATED ORAL
Qty: 360 TABLET | Refills: 1 | Status: SHIPPED | OUTPATIENT
Start: 2025-05-15

## 2025-05-21 LAB — 10OH-CARBAZEPINE SERPL-MCNC: 34 MCG/ML (ref 8–35)

## 2025-05-28 ENCOUNTER — APPOINTMENT (OUTPATIENT)
Dept: PREADMISSION TESTING | Facility: HOSPITAL | Age: 75
End: 2025-05-28
Payer: MEDICARE

## 2025-05-28 NOTE — CPM/PAT H&P
"[facility]/PAT Evaluation       Name: Adebayo Puentes (Adebayo Puentes \"Ed\")  /Age: 1950/74 y.o.     {[facility] Visit Type:71700}      Chief Complaint: ***    HPI    Medical History[1]    Surgical History[2]    Patient  has no history on file for sexual activity.    Family History[3]    Allergies[4]    Adebayo Puentes is scheduled for Microvascular Decompression with Lumbar Drain Placement - Right on 25    **Data Input  Prior to Admission medications    Medication Sig Start Date End Date Taking? Authorizing Provider   amLODIPine (Norvasc) 10 mg tablet Take 1 tablet (10 mg) by mouth once daily. 3/1/24   Mayda Carter MD   ascorbic acid, vitamin C, 1,000 mg ER tablet Take by mouth.    Historical Provider, MD   aspirin 81 mg EC tablet Take 1 tablet (81 mg) by mouth once daily.  Patient not taking: Reported on 2025    Historical Provider, MD   atenolol (Tenormin) 25 mg tablet Take 1 tablet (25 mg) by mouth once daily. 3/1/24   Mayda Carter MD   atorvastatin (Lipitor) 40 mg tablet Take 1 tablet (40 mg) by mouth once daily. 24  Mayda Carter MD   calcium carbonate-vitamin D3 (Caltrate with Vitamin D3) 600 mg-20 mcg (800 unit) tablet Take by mouth twice a day.    Historical Provider, MD   carBAMazepine (TEGretol) 100 mg chewable tablet Take 300 mg in the AM, and 400 mg at night 3/20/25   Lyssa Lo MD   cholecalciferol (Vitamin D3) 5,000 Units tablet Take 1 tablet (125 mcg) by mouth 1 (one) time per week.    Historical Provider, MD   escitalopram (Lexapro) 20 mg tablet Take 1 tablet (20 mg) by mouth once daily. 3/1/24   Mayda Carter MD   esomeprazole (NexIUM) 20 mg DR capsule Take 1 capsule (20 mg) by mouth 2 times a day. Do not open capsule. 24  Mayda Carter MD   gabapentin (Neurontin) 100 mg capsule Take 2 capsules (200 mg) by mouth 2 times a day. 5/15/25   Annie José MD   losartan-hydrochlorothiazide (Hyzaar) 100-25 mg tablet Take 1 tablet by mouth once daily. 3/20/25 9/16/25  " Mayda Carter MD   montelukast (Singulair) 10 mg tablet Take 1 tablet (10 mg) by mouth once daily. 3/1/24   Mayda Carter MD   multivitamin with minerals iron-free (Centrum Silver) Take 1 tablet by mouth.    Historical Provider, MD   Myrbetriq 50 mg tablet extended release 24 hr 24 hr tablet Take 1 tablet (50 mg) by mouth once daily. 10/25/24 10/25/25  Yadira Fuentes MD   OXcarbazepine (Trileptal) 300 mg tablet 2 tablets 2 times daily 5/15/25   Annie José MD   traZODone (Desyrel) 100 mg tablet Take 1 tablet (100 mg) by mouth once daily at bedtime. 3/1/24   Mayda Carter MD        PAT ROS     PAT Physical Exam     Airway    Visit Vitals  Smoking Status Never       DASI Risk Score      Flowsheet Row Questionnaire Series Submission from 5/11/2025 in [facility] [place] with Generic Provider Angeline   Can you take care of yourself (eat, dress, bathe, or use toilet)?  2.75  filed at 05/11/2025 1916   Can you walk indoors, such as around your house? 1.75  filed at 05/11/2025 1916   Can you walk a block or two on level ground?  2.75  filed at 05/11/2025 1916   Can you climb a flight of stairs or walk up a hill? 5.5  filed at 05/11/2025 1916   Can you run a short distance? 0  filed at 05/11/2025 1916   Can you do light work around the house like dusting or washing dishes? 2.7  filed at 05/11/2025 1916   Can you do moderate work around the house like vacuuming, sweeping floors or carrying groceries? 3.5  filed at 05/11/2025 1916   Can you do heavy work around the house like scrubbing floors or lifting and moving heavy furniture?  8  filed at 05/11/2025 1916   Can you do yard work like raking leaves, weeding or pushing a mower? 4.5  filed at 05/11/2025 1916   Can you have sexual relations? 5.25  filed at 05/11/2025 1916   Can you participate in moderate recreational activities like golf, bowling, dancing, doubles tennis or throwing a baseball or football? 6  filed at 05/11/2025 1916   Can you  participate in strenous sports like swimming, singles tennis, football, basketball, or skiing? 0  filed at 05/11/2025 1916   DASI SCORE 42.7  filed at 05/11/2025 1916   METS Score (Will be calculated only when all the questions are answered) 8  filed at 05/11/2025 1916          Caprini DVT Assessment    No data to display       Modified Frailty Index    No data to display       [id number] Stroke Risk Points  Current as of just now        N/A 0 to 9 Points:      Last Change: N/A          The GKG3DB6-CNHg risk score (Lip RAQUEL, et al. 2009. © 2010 [employer]) quantifies the risk of stroke for a patient with atrial fibrillation. For patients without atrial fibrillation or under the age of 18 this score appears as N/A. Higher score values generally indicate higher risk of stroke.        This score is not applicable to this patient. Components are not calculated.          Revised Cardiac Risk Index    No data to display       Apfel Simplified Score    No data to display       Risk Analysis Index Results This Encounter    No data found in the last 10 encounters.       Stop Bang Score      Flowsheet Row Questionnaire Series Submission from 5/11/2025 in [facility] [place] with Generic Provider Mychart Esophagogastroduodenoscopy from 10/17/2024 in [facility] with Virginia Delgado MD and Montana Moreno MD   Do you snore loudly? 0  filed at 05/11/2025 1916 0 filed at 10/17/2024 1127   Do you often feel tired or fatigued after your sleep? 0  filed at 05/11/2025 1916 0 filed at 10/17/2024 1127   Has anyone ever observed you stop breathing in your sleep? 0  filed at 05/11/2025 1916 0 filed at 10/17/2024 1127   Do you have or are you being treated for high blood pressure? 1  filed at 05/11/2025 1916 1 filed at 10/17/2024 1127   Recent BMI (Calculated) 31.4  filed at 05/11/2025 1916 28.8 filed at 10/17/2024 1127   Is BMI greater than 35 kg/m2? 0=No  filed at 05/11/2025   0=No filed at 10/17/2024 1127   Age older than 50 years old? 1=Yes  filed at 2025 1=Yes filed at 10/17/2024 112   Is your neck circumference greater than 17 inches (Male) or 16 inches (Female)? -- 1 filed at 10/17/2024 1127   Gender - Male 1=Yes  filed at 2025 1=Yes filed at 10/17/2024 112   STOP-BANG Total Score -- 4 filed at 10/17/2024 1127          Prodigy: High Risk  Total Score: 20              Prodigy Age Score      Prodigy Gender Score          ARISCAT Score for Postoperative Pulmonary Complications    No data to display       Kristofer Perioperative Risk for Myocardial Infarction or Cardiac Arrest (FELTON)    No data to display     --Testing/Diagnostic:        - EK25  Normal sinus rhythm  Normal ECG  When compared with ECG of 05-SEP-2024 14:54,  No significant change was found      - Echo: 25  CONCLUSIONS:   1. The left ventricular systolic function is normal, with a Quinteros's biplane calculated ejection fraction of 68%.   2. Spectral Doppler shows a Grade I (impaired relaxation pattern) of left ventricular diastolic filling with normal left atrial filling pressure.   3. There is mildly reduced right ventricular systolic function.   4. The left atrium is mildly dilated.   5. Mild aortic valve stenosis.  There is trace to mild tricuspid regurgitation.   There is mild mitral valve regurgitation.       - MR Brain: 25  IMPRESSION:  Evaluation is somewhat degraded due to patient motion.      1. Redemonstration of a tortuous basilar artery which abuts the  proximal cisternal segment of the right trigeminal nerve. The origin  of the right AICA also abuts the superior surface of the proximal  cisternal segment of the right trigeminal nerve. These findings are  similar in appearance compared to the prior MRI brain study from  2021.      2. Otherwise, unremarkable MRI of the brain.      - CT ANGIO CORONARY ARTERIES: 25  IMPRESSION:  1. STENOSIS: Normal coronary  anatomy with scattered coronary artery atherosclerosis. Focal noncalcified plaque of dominant proximal diagonal branch with estimated moderate stenosis (50-69%). Mild nonobstructive atherosclerosis of distal left main coronary artery, proximal LAD and distal RCA (up to 1-24% stenosis) CT FFR results will be dictated as an addendum when available  2. PLAQUE BURDEN: Total coronary artery calcium score of 40.9,  indicating mild amount of calcified coronary plaque.  3. Status post Nissen fundoplication. Note is made of a small hiatal hernia. The visualized esophagus appears mildly patulous with mild concentric thickening in its distal aspect; correlate with concern for reflux esophagitis.      - MR Prostate: 09/23/24  IMPRESSION:  1.  Stable findings including the subcentimeter PI-RADS 4 lesion in the anterior midline transitional zone near the apex. No evidence of extracapsular disease.      - MR Gamma knife planning: 10/1321  IMPRESSION:     A new stereotactic head holding device is identified when compared with 08/04/2021.     The current FLAIR images demonstrate a small amount of curvilinear bright signal along sulci of the right frontal lobe which conceivably could be technical/artifactual in origin although a small amount of subarachnoid hemorrhage, proteinaceous, and/or cellular debris within  the subarachnoid space could give a similar MRI appearance and cannot be excluded.     There is again evidence of a tortuous vertebrobasilar system with the tortuous basilar artery extending into the expected region of the course of the cisternal segment of the right trigeminal nerve. There is again suggestion of asymmetric diminished caliber of the cisternal segment of the right trigeminal nerve when compared with the left.     There is again evidence of similar mild-to-moderate brain parenchymal volume loss.     Minimal nonspecific white matter changes are again noted within  cerebral hemispheres bilaterally which while  "nonspecific, given the patient's age, likely represent sequelae of more remote small vessel ischemic change.     No abnormal intracranial enhancing mass lesion is noted on the  postcontrast images.     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       - PSA: 5.04 on 05/01/25      - Creatinine: 1.48 on 05/01/25        Patient Specialist/PCP:                                                                                                             Cardiology: Toshia Ornelas CNP 04/09/25 follow-up of coronary artery disease.  Adebayo \"Ed\" initially presented with acute MI and underwent successful thrombolytic therapy (2000).       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       NeuroSx: Rohit Riddle 04/23/25 evaluation of facial pain referred by Dr. José, idiopathic trigeminal neuralgia affecting the right V1 through V3 distributions. Hx of gamma knife radiosurgery in 2021 with excellent pain relief     MRI of the brain from April 2025 was reviewed.  This demonstrates compression of the right trigeminal nerve by both a tortuous basilar artery as well as the right AICA.  The right trigeminal nerve appears atrophied compared to the left.       Nephrology: Hamzah Chino CNP 05/09/25 follow up of CKD3: baseline sCr 1.3 - 1.7 mg/dL with eGFR 42 - 57 ml/min/1.73m2 since at least July 2019.       ENT: Virginia Delgado 01/28/25 Follow-up for dysphagia and dysphonia.  He has a history of GERD s/p Nissen fundoplication in 1986.       Neurology: Annie José 04/11/25 presents with chief complaint of facial pain, referred by Dr Lo.       Urology: Yadira Fuentes 05/06/25 annual follow up visit, history of " TURP 07/2020 and PVP 05/05/2022. S/p TP prostate biopsy (9/12/23) with Dr. 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 History of elevated PSA.            _______________________________________________________  **Data input only. No medications, history or providers verified           Liza Mohr LPN  Preadmission Testing          Nurse Plan of Action:  Cardiac recommendation per TP encounter on 4/28/25  Hi Ed,   Instead of waiting for the report Lynda and I looked through it and deemed stent is not necessary. There would be no cardiac barriers to Microvascular decompression surgery.   -Toshia        Assessment and Plan:     {[facility] ASSESSMENT AND PLAN:41897}             [1]   Past Medical History:  Diagnosis Date    Abnormal findings on diagnostic imaging of other parts of musculoskeletal system 09/21/2016    Abnormal bone radiograph    BPH (benign prostatic hyperplasia)     CKD (chronic kidney disease)     Cutaneous abscess of neck 09/23/2021    Neck abscess    Cyst of kidney, acquired 12/01/2015    Renal cyst    Depression     GERD (gastroesophageal reflux disease)     Hyperlipidemia     Hypertension     Old myocardial infarction     History of myocardial infarction    Old myocardial infarction 06/06/2013    Past myocardial infarction    Personal history of other diseases of the circulatory system     History of coronary artery disease    Personal history of other diseases of the digestive system     History of hiatal hernia    Personal history of other diseases of urinary system     History of kidney disease    Personal history of other mental and behavioral disorders     History of depression    Personal history of other specified conditions 09/02/2016    History of abdominal pain    Personal history of other specified conditions     History of chest pain    Personal history of other specified conditions     H/O shortness of breath    Prostate cancer (Multi)      Trigeminal neuralgia    [2]   Past Surgical History:  Procedure Laterality Date    GASTRIC FUNDOPLICATION  02/25/2015    Esophagogastric Fundoplasty Nissen Fundoplication    OTHER SURGICAL HISTORY  06/11/2013    Wrist Surgery    OTHER SURGICAL HISTORY  09/23/2021    Neck surgery    OTHER SURGICAL HISTORY  09/23/2021    Transurethral resection of prostate    PROSTATE SURGERY      ROTATOR CUFF REPAIR  06/11/2013    Rotator Cuff Repair    US GUIDED ABSCESS DRAIN  02/10/2017    US GUIDED ABSCESS DRAIN 2/10/2017 [facility]    US GUIDED PERCUTANEOUS PERITONEAL OR RETROPERITONEAL FLUID COLLECTION DRAINAGE  02/10/2017    US GUIDED PERCUTANEOUS PERITONEAL OR RETROPERITONEAL FLUID COLLECTION DRAINAGE 2/10/2017 [facility]   [3]   Family History  Problem Relation Name Age of Onset    Other (heart trouble) Mother      Kidney disease Father      Heart disease Father      Other (hypoglycemia) Father      Other (heart trouble) Father     [4] No Known Allergies

## 2025-06-03 ENCOUNTER — PRE-ADMISSION TESTING (OUTPATIENT)
Dept: PREADMISSION TESTING | Facility: HOSPITAL | Age: 75
End: 2025-06-03
Payer: MEDICARE

## 2025-06-03 VITALS
SYSTOLIC BLOOD PRESSURE: 107 MMHG | TEMPERATURE: 97.7 F | BODY MASS INDEX: 28.62 KG/M2 | WEIGHT: 223 LBS | DIASTOLIC BLOOD PRESSURE: 68 MMHG | OXYGEN SATURATION: 97 % | HEIGHT: 74 IN | HEART RATE: 50 BPM

## 2025-06-03 DIAGNOSIS — R79.9 ABNORMAL FINDING OF BLOOD CHEMISTRY, UNSPECIFIED: ICD-10-CM

## 2025-06-03 DIAGNOSIS — Z01.818 PREOPERATIVE EXAMINATION: Primary | ICD-10-CM

## 2025-06-03 DIAGNOSIS — G50.0 TRIGEMINAL NEURALGIA: ICD-10-CM

## 2025-06-03 DIAGNOSIS — N18.31 STAGE 3A CHRONIC KIDNEY DISEASE (MULTI): ICD-10-CM

## 2025-06-03 DIAGNOSIS — I10 ESSENTIAL HYPERTENSION: ICD-10-CM

## 2025-06-03 DIAGNOSIS — Z91.89 AT RISK FOR SLEEP APNEA: ICD-10-CM

## 2025-06-03 DIAGNOSIS — R79.1 ABNORMAL COAGULATION PROFILE: ICD-10-CM

## 2025-06-03 LAB
ABO GROUP (TYPE) IN BLOOD: NORMAL
ANTIBODY SCREEN: NORMAL
APTT PPP: 31 SECONDS (ref 26–36)
BASOPHILS # BLD AUTO: 0.02 X10*3/UL (ref 0–0.1)
BASOPHILS NFR BLD AUTO: 0.4 %
EOSINOPHIL # BLD AUTO: 0.11 X10*3/UL (ref 0–0.4)
EOSINOPHIL NFR BLD AUTO: 2 %
ERYTHROCYTE [DISTWIDTH] IN BLOOD BY AUTOMATED COUNT: 13.1 % (ref 11.5–14.5)
EST. AVERAGE GLUCOSE BLD GHB EST-MCNC: 111 MG/DL
HBA1C MFR BLD: 5.5 % (ref ?–5.7)
HCT VFR BLD AUTO: 41.2 % (ref 41–52)
HGB BLD-MCNC: 13.4 G/DL (ref 13.5–17.5)
IMM GRANULOCYTES # BLD AUTO: 0.01 X10*3/UL (ref 0–0.5)
IMM GRANULOCYTES NFR BLD AUTO: 0.2 % (ref 0–0.9)
INR PPP: 1.1 (ref 0.9–1.1)
LYMPHOCYTES # BLD AUTO: 1.23 X10*3/UL (ref 0.8–3)
LYMPHOCYTES NFR BLD AUTO: 21.9 %
MCH RBC QN AUTO: 31.2 PG (ref 26–34)
MCHC RBC AUTO-ENTMCNC: 32.5 G/DL (ref 32–36)
MCV RBC AUTO: 96 FL (ref 80–100)
MONOCYTES # BLD AUTO: 0.56 X10*3/UL (ref 0.05–0.8)
MONOCYTES NFR BLD AUTO: 10 %
NEUTROPHILS # BLD AUTO: 3.68 X10*3/UL (ref 1.6–5.5)
NEUTROPHILS NFR BLD AUTO: 65.5 %
NRBC BLD-RTO: 0 /100 WBCS (ref 0–0)
PLATELET # BLD AUTO: 191 X10*3/UL (ref 150–450)
PROTHROMBIN TIME: 11.9 SECONDS (ref 9.8–12.4)
RBC # BLD AUTO: 4.29 X10*6/UL (ref 4.5–5.9)
RH FACTOR (ANTIGEN D): NORMAL
WBC # BLD AUTO: 5.6 X10*3/UL (ref 4.4–11.3)

## 2025-06-03 PROCEDURE — 83036 HEMOGLOBIN GLYCOSYLATED A1C: CPT

## 2025-06-03 PROCEDURE — 86850 RBC ANTIBODY SCREEN: CPT

## 2025-06-03 PROCEDURE — 85025 COMPLETE CBC W/AUTO DIFF WBC: CPT

## 2025-06-03 PROCEDURE — 85610 PROTHROMBIN TIME: CPT

## 2025-06-03 PROCEDURE — 99205 OFFICE O/P NEW HI 60 MIN: CPT | Performed by: FAMILY MEDICINE

## 2025-06-03 PROCEDURE — 87081 CULTURE SCREEN ONLY: CPT

## 2025-06-03 PROCEDURE — 36415 COLL VENOUS BLD VENIPUNCTURE: CPT

## 2025-06-03 RX ORDER — CHLORHEXIDINE GLUCONATE 40 MG/ML
SOLUTION TOPICAL
Qty: 473 ML | Refills: 0 | Status: SHIPPED | OUTPATIENT
Start: 2025-06-03

## 2025-06-03 RX ORDER — OMEPRAZOLE 20 MG/1
20 TABLET, DELAYED RELEASE ORAL
COMMUNITY

## 2025-06-03 RX ORDER — CHLORHEXIDINE GLUCONATE ORAL RINSE 1.2 MG/ML
SOLUTION DENTAL
Qty: 15 ML | Refills: 0 | Status: SHIPPED | OUTPATIENT
Start: 2025-06-03

## 2025-06-03 ASSESSMENT — DUKE ACTIVITY SCORE INDEX (DASI)
CAN YOU PARTICIPATE IN MODERATE RECREATIONAL ACTIVITIES LIKE GOLF, BOWLING, DANCING, DOUBLES TENNIS OR THROWING A BASEBALL OR FOOTBALL: NO
TOTAL_SCORE: 31.45
CAN YOU PARTICIPATE IN STRENOUS SPORTS LIKE SWIMMING, SINGLES TENNIS, FOOTBALL, BASKETBALL, OR SKIING: NO
CAN YOU DO MODERATE WORK AROUND THE HOUSE LIKE VACUUMING, SWEEPING FLOORS OR CARRYING GROCERIES: YES
CAN YOU DO HEAVY WORK AROUND THE HOUSE LIKE SCRUBBING FLOORS OR LIFTING AND MOVING HEAVY FURNITURE: YES
CAN YOU DO LIGHT WORK AROUND THE HOUSE LIKE DUSTING OR WASHING DISHES: YES
CAN YOU HAVE SEXUAL RELATIONS: NO
CAN YOU WALK INDOORS, SUCH AS AROUND YOUR HOUSE: YES
CAN YOU DO YARD WORK LIKE RAKING LEAVES, WEEDING OR PUSHING A MOWER: YES
DASI METS SCORE: 6.6
CAN YOU CLIMB A FLIGHT OF STAIRS OR WALK UP A HILL: YES
CAN YOU TAKE CARE OF YOURSELF (EAT, DRESS, BATHE, OR USE TOILET): YES
CAN YOU RUN A SHORT DISTANCE: NO
CAN YOU WALK A BLOCK OR TWO ON LEVEL GROUND: YES

## 2025-06-03 ASSESSMENT — ENCOUNTER SYMPTOMS
GASTROINTESTINAL NEGATIVE: 1
CONSTITUTIONAL NEGATIVE: 1
NECK NEGATIVE: 1
NEUROLOGICAL NEGATIVE: 1
EYES NEGATIVE: 1
ENDOCRINE NEGATIVE: 1
DYSPNEA WITH EXERTION: 1
ARTHRALGIAS: 1
SHORTNESS OF BREATH: 1

## 2025-06-03 ASSESSMENT — LIFESTYLE VARIABLES: SMOKING_STATUS: NONSMOKER

## 2025-06-03 NOTE — PREPROCEDURE INSTRUCTIONS
Thank you for visiting [facility] ([facility]) today for your pre-procedure evaluation, you were seen by     URIEL Hanna  Department of Anesthesiology and Perioperative Medicine  Main phone [phone]  Fax [phone]    This summary includes instructions and information to aid you during your perioperative period.  Please read carefully. If you have any questions about your visit today, please call the number listed above.  If you become ill or have any changes to your health before your surgery, please contact your primary care provider and alert your surgeon.    General Medications Instructions (see back for further medication instructions)  Hold all vitamins and supplements 7 days prior to surgery  Tylenol okay to continue, please HOLD Aleve/naproxen/ibuprofen (NSAIDs) for 7 days prior to surgery  No lotion/moisturizers or Deoderant after last shower prior to surgery    You will be called business day prior to surgery to confirm arrival time.     Preparing for Surgery       Preoperative Fasting Guidelines  Why must I stop eating and drinking near surgery time?  With sedation, food or liquid in your stomach can enter your lungs causing serious complications  Food can increase nausea and vomiting  When do I need to stop eating and drinking before my surgery?  Do not eat any food after midnight the night before your surgery/procedure.  You may have up to 13.5 ounces of clear liquid until TWO hours before your instructed arrival time to the hospital.  This includes water, black tea/coffee, (no milk or cream) apple juice, and electrolyte drinks (Gatorade)  You may chew gum until TWO hours before your surgery/procedure   Do not eat any food after midnight the night before your surgery/procedure. You may have up to 13.5 ounces of clear liquid until TWO hours before your instructed arrival time to the hospital.  This includes water, black tea/coffee, (no milk or cream)  apple juice, and electrolyte drinks (Gatorade). You may chew gum until TWO hours before your surgery/procedure , Do not eat any food or drink any liquids after midnight the night before your surgery/procedure.  You may have sips of water to take medications.       Tobacco and Alcohol;  Do not drink alcohol or smoke within 24 hours of surgery.  It is best to quit smoking for as long as possible before any surgery or procedure.    Quitting Smoking; Recognizing Dangerous Situations:  1-Alcohol use during the first month after quitting, 2-Being around smoke or someone who smokes 3-Times situation routinely smoked  4-Triggers-car, breaks, coffee, when awakening, social events  Coping Skills: 1-Learning new ways to manage stress 2-Exercising 3-Relaxation breathing   4-Change routines 5-Distraction techniques    Websites:  Smoke-Free - offers free text messages and an chiqui to help you quit. Info includes eating and mood issues that may come with quitting. There is a Live Helpline to talk to an expert. Go to smokefree.gov; Become an Ex-Smoker - the free EX Plan is based on scientific research and useful advice from ex-smokers. It isn't just about quitting smoking.  It's about re-learning life without cigarettes using a 3-step program.  Go to becomeanex.org   Centers for Disease Control offer many suggestions for helping you quit. Includes a Quit Guide and real-life stories. There are sections for specific groups such as LGBT, , different ethnic groups, and pregnant women.  Go to cdc.gov/tobacco/campaign/tips    Other Resources:  [place] Tobacco Quit Line - call 1-800-QUIT-NOW or 1-377.367.1416.  [facility] 2-1-1 - to find local programs and resources. Call 211 or go to 211.org.  [facility] Tobacco Cessation Program - call 122-742-3454.  American Lung Association offers classes for quitting smoking. Some places may charge a fee. For a list of classes, go to lung.org or call 1-800-LUNG-[place].     Some things to  think about:; The health benefits of quitting smoking can help most of the major parts of your body. There is no safe amount of cigarette smoke. Quitting smoking can add years to your life. When you quit, you'll also protect your loved ones from dangerous secondhand smoke. Make a plan, join a support group, and talk to your physician to assist in quitting smoking.                                                                                                              , Quitting Alcohol; Things to think about: Alcohol use disorder is a health condition that can improve with treatment. Each person is unique. A treatment that is good for one person may not be a good fit for someone else. Simply knowing the different options can be an important first step. Treatment can be inpatient, where you stay at a facility, or outpatient, where you stay at home. Your insurance plan may cover some treatment costs.   Resources:    NIAAA Alcohol Treatment Navigator - from the National [place] on Alcohol Abuse and  Alcoholism. Offers an online tool to help you find the right treatment for you - near you. Go to alcoholtreatment.niaaa.nih.gov.  [facility] National Hotline  - from the Substance Abuse and Mental Health Services Administration. A free, confidential 24-hour treatment referral and information service for anyone facing mental and/or substance use disorders. Go to findtreatment.gov or call 9-140-685-HELP (6836).    Alcoholics Anonymous (AA) - offers group meetings and a 12-step program to anyone who has a drinking problem. Go to aa.org or call 231-042-4555    [facility] 2-1-1 - to find local programs and resources. Call 211 or go to Certus.org    Other people you can talk to about treatment options include your primary care doctor, health insurance plan, local health department, or employee assistance program. You can also ask to talk to a hospital .         Home Preoperative Antibacterial Shower     What is a home  preoperative antibacterial shower?  This shower is a way of cleaning the skin with a germ killing soap before surgery.  The soap contains chlorhexidine, commonly known as CHG.  CHG is a soap for your skin with germ killing ability.  Let your doctor know if you are allergic to chlorhexidine.    Why do I need to take a preoperative antibacterial shower?  Skin is not sterile.  It is best to try to make your skin as free of germs as possible before surgery.  Proper cleansing with a germ killing soap before surgery can lower the number of germs on your skin.  This helps to reduce the risk of infection at the surgical site.  Following the instructions listed below will help you prepare your skin for surgery.      How do I use the CHG skin cleanser?  Steps:  Begin using your CHG soap five days before your scheduled surgery on ________________________.    Days 1-4 Shower before bed:  Wash your face and genitals with your normal soap and rinse.           2.    Apply the CHG soap to a clean wet washcloth.  Turn the water off or move away                From the water spray to avoid premature rinsing of the CHG soap as you are applying.     3.   Lather your entire body from the neck down.  Do not use on your face or genitals.  4. Pay special attention to the area(s) where your incision(s) will be located unless they are on your face.  Avoid scrubbing your skin too hard.  The important point is to have the CHG soap sit on your skin for 3 minutes.    When the 3 minutes are up, turn on the water and rinse the CHG soap off your body completely.   Pat yourself dry with a clean, freshly-laundered towel.  Dress in clean, freshly laundered night clothes.    Be sure to change bed sheets and blankets at least on the first night of CHG body wash use. May change linens every night of the above protocol for maximum benefit.   Day 5:  Last shower is the morning of surgery: Follow above Instructions.    NOTE:        *Keep CHG soap out of  eyes and ear canals   *DO NOT wash with regular soap on your body after you have used the CHG soap solution  *DO NOT apply powders, lotions, or perfume.  *Deodorant may be used days 1-4, BUT NOT the day of surgery          Patient Information: Pre-Operative Infection Prevention Measures     Why did I have my nose, under my arms and groin swabbed?  The purpose of the swab is to identify Staphylococcus aureus inside your nose or on your skin.  The swab was sent to the laboratory for culture.  A positive swab/culture for Staphylococcus aureus is called colonization or carriage.      What is Staphylococcus aureus?  Staphylococcus aureus, also known as “staph”, is a germ found on the skin or in the nose of healthy people.  Sometimes Staphylococcus aureus can get into the body and cause an infection.  This can be minor (such as pimples, boils or other skin problems).  It might also be serious (such as blood infection, pneumonia or a surgical site infection).    What is Staphylococcus aureus colonization or carriage?  Colonization or carriage means that a person has the germ but is not sick from it.  These bacteria can be spread on the hands or when breathing or sneezing.    How is Staphylococcus aureus spread?  It is most often spread by close contact with a person or item that carries it.    What happens if my culture is positive for Staphylococcus aureus?  Your doctor/medical team will use this information to guide any antibiotic treatment which may be necessary.  Regardless of the culture results, we will clean the inside of your nose with a betadine swab just before you have your surgery.      Will I get an infection if I have Staphylococcus aureus in my nose or on my skin?  Anyone can get an infection with Staphylococcus aureus.  However, the best way to reduce your risk of infection is to follow the instructions provided to you for the use of your CHG soap and dental rinse.            Patient Information:  Oral/Dental Rinse  **This is a prescription; pick it up at your preferred local pharmacy **  What is oral/dental rinse?   It is a mouthwash. It is a way of cleaning the mouth with a germ killing solution before your surgery.  The solution contains chlorhexidine, commonly known as CHG.   It is used inside the mouth to kill a bacteria known as Staphylococcus aureus.  Let your doctor know if you are allergic to Chlorhexidine.    Why do I need to use CHG oral/dental rinse?  The CHG oral/dental rinse helps to kill a bacteria in your mouth known a Staphylococcus aureus.     This reduces the risk of infection at the surgical site.      Using your CHG oral/dental rinse  STEPS:  Use your CHG oral/dental rinse after you brush your teeth the night before (at bedtime) and the morning of your surgery.  Follow all directions on your prescription label.    Use the cap on the container to measure 15ml (fill cap to fill line)  Swish (gargle if you can) the mouthwash in your mouth for at least 30 seconds, (do not to swallow) spit out  After you use your CHG rinse, do not rinse your mouth with water, drink or eat.  Please refer to prescription label for the appropriate time to resume oral intake  Dental rinse comes in one size bottle: 473ml ~16oz.  You will have leftover    rinse, discard after this use.    What side effects might I have using the CHG oral/dental rinse?  CHG rinse will stick to plaque on the teeth.  Brush and floss just before use.  Teeth brushing will help avoid staining of plaque during use.           The Week before Surgery        Seven days before Surgery  Check your [facility] medication instructions  Do the exercises provided to you by [facility]   Arrange for a responsible, adult licensed  to take you home after surgery and stay with you for 24 hours.  You will not be permitted to drive yourself home if you have received any anesthetic/sedation  Five days before surgery  Check your [facility] medication instructions  Do the  exercises provided to you by [facility]   Start using Chlorhexidene (CHG) body wash if prescribed (Continue till day of surgery)      The Day before Surgery       Check your [facility] medication and all other [facility] instructions including when to stop eating and drinking  You will be called with your arrival time for surgery in the late afternoon.  If you do not receive a call please reach out to your surgeon's office.  Do not smoke or drink 24 hours before surgery  Prepare items to bring with you to the hospital  Shower with your chlorhexidine wash if prescribed  Brush your teeth and use your chlorhexidine dental rinse if prescribed    The Day of Surgery       Check your [facility] medication instructions  Ensure you follow the instructions for when to stop eating and drinking  Shower, if prescribed use CHG.  Do not apply any lotions, creams, moisturizers, perfume or deodorant  Brush your teeth and use your CHG dental rinse if prescribed  Wear loose comfortable clothing  Avoid make-up  Remove  jewelry and piercings, consider professional piercing removal with a plastic spacer if needed  Bring photo ID and Insurance card  Bring an accurate medication list that includes medication dose, frequency and allergies  Bring a copy of your advanced directives (will, health care power of )  Bring any devices and controllers as well as medical devices you have been provided with for surgery (CPAP, slings, braces, etc.)  Dentures, eyeglasses, and contacts will be removed before surgery, please bring cases for contacts or glasses    Preoperative Deep Breathing Exercises    Why it is important to do deep breathing exercises before my surgery?  Deep breathing exercises strengthen your breathing muscles.  This helps you to recover after your surgery and decreases the chance of breathing complications.    How are the deep breathing exercises done?  Sit straight with your back supported.  Breathe in deeply and slowly through your nose. Your lower  rib cage should expand and your abdomen may move forward.  Hold that breath for 3 to 5 seconds.  Breathe out through pursed lips, slowly and completely.  Rest and repeat 10 times every hour while awake.  Rest longer if you become dizzy or lightheaded.        Preoperative Brain Exercises    What are brain exercises?  A brain exercise is any activity that engages your thinking (cognitive) skills.    What types of activities are considered brain exercises?  Jigsaw puzzles, crossword puzzles, word jumble, memory games, word search, and many more.  Many can be found free online or on your phone via a mobile chiqui.    Why should I do brain exercises before my surgery?  More recent research has shown brain exercise before surgery can lower the risk of postoperative delirium (confusion) which can be especially important for older adults.  Patients who did brain exercises for 5 to 10 hours the days before surgery, cut their risk of postoperative delirium in half up to 1 week after surgery.    Sit-to-Stand Exercise    What is the sit-to-stand exercise?  The sit-to-stand exercise strengthens the muscles of your lower body and muscles in the center of your body (core muscles for stability) helping to maintain and improve your strength and mobility.  How do I do the sit-to-stand exercise?  The goal is to do this exercise without using your arms or hands.  If this is too difficult, use your arms and hands or a chair with armrests to help slowly push yourself to the standing position and lower yourself back to the sitting position. As the movement becomes easier use your arms and hands less.    Steps to the sit-to-stand exercise  Sit up tall in a sturdy chair, knees bent, feet flat on the floor shoulder-width apart.  Shift your hips/pelvis forward in the chair to correctly position yourself for the next movement.  Lean forward at your hips.  Stand up straight putting equal weight on both feet.  Check to be sure you are properly  aligned with the chair, in a slow controlled movement sit back down.  Repeat this exercise 10-15 times.  If needed you can do it fewer times until your strength improves.  Rest for 1 minute.  Do another 10-15 sit-to-stand exercises.  Try to do this in the morning and evening.       Simple things you can do to help prevent blood clots     Blood clots are blockages that can form in the body's veins. When a blood clot forms in your deep veins, it may be called a deep vein thrombosis, or DVT for short. Blood clots can happen in any part of the body where blood flows, but they are most common in the arms and legs. If a piece of a blood clot breaks free and travels to the lungs, it is called a pulmonary embolus (PE). A PE can be a very serious problem.      Being in the hospital or having surgery can raise your chances of getting a blood clot because you may not be well enough to move around as much as you normally do.         Ways you can help prevent blood clots in the hospital       Wearing SCDs  SCDs stands for Sequential Compression Devices.   SCDs are special sleeves that wrap around your legs. They attach to a pump that fills them with air to gently squeeze your legs every few minutes.  This helps return the blood in your legs to your heart.   SCDs should only be taken off when walking or bathing. SCDs may not be comfortable, but they can help save your life.              Pump SCD leg sleeves  Wearing compression stockings - if your doctor orders them. These special snug-fitting stockings gently squeeze your legs to help blood flow.       Walking. Walking helps move the blood in your legs.   If your doctor says it is ok, try walking the halls at least   5 times a day. Ask us to help you get up, so you don't fall.      Taking any blood-thinning medicines your doctor orders.              Ways you can help prevent blood clots at home         Wearing compression stockings - if your doctor orders them.   Walking - to  help move the blood in your legs.    Taking any blood-thinning medicines your doctor orders.      Signs of a blood clot or PE    Tell your doctor or nurse right away if you have any of the problems listed below.         If you are at home, seek medical care right away. Call 911 for chest pain or problems breathing.            Signs of a blood clot (DVT) - such as pain, swelling, redness, or warmth in your arm or legs.  Signs of a pulmonary embolism (PE) - such as chest pain or feeling short of breath

## 2025-06-03 NOTE — CPM/PAT H&P
"CPM/PAT Evaluation       Name: Adebayo Puentes (Adebayo Puentes \"Ed\")  /Age: 1950/74 y.o.       Visit Type:   In-Person       Chief Complaint: perioperative evaluation    HPI    Adebayo Puentes is a 74 y.o. male scheduled for Microvascular Decompression with Lumbar Drain Placement - Right on 2025 secondary to trigeminal neuralgia with Dr. Riddle who referred to [facility].  Presents to [facility] today for perioperative risk stratification and optimization. PMHx includes CAD, hyperlipidemia, hypertension, MI in , hearing loss with use of hearing aids, GERD, CKD, nephrolithiasis, BPH, prostate cancer, depression, arthritis, trigeminal neuralgia.      Medical History[1]    Surgical History[2]    Patient Sexual activity questions deferred to the physician.    Family History[3]    Allergies[4]    Prior to Admission medications    Medication Sig Start Date End Date Taking? Authorizing Provider   amLODIPine (Norvasc) 10 mg tablet Take 1 tablet (10 mg) by mouth once daily. 3/1/24   Mayda Carter MD   ascorbic acid, vitamin C, 1,000 mg ER tablet Take by mouth.    Historical Provider, MD   aspirin 81 mg EC tablet Take 1 tablet (81 mg) by mouth once daily.  Patient not taking: Reported on 2025    Historical Provider, MD   atenolol (Tenormin) 25 mg tablet Take 1 tablet (25 mg) by mouth once daily. 3/1/24   Mayda Carter MD   atorvastatin (Lipitor) 40 mg tablet Take 1 tablet (40 mg) by mouth once daily. 24  Mayda Carter MD   calcium carbonate-vitamin D3 (Caltrate with Vitamin D3) 600 mg-20 mcg (800 unit) tablet Take by mouth twice a day.    Historical Provider, MD   carBAMazepine (TEGretol) 100 mg chewable tablet Take 300 mg in the AM, and 400 mg at night 3/20/25   Lyssa Lo MD   cholecalciferol (Vitamin D3) 5,000 Units tablet Take 1 tablet (125 mcg) by mouth 1 (one) time per week.    Historical Provider, MD   escitalopram (Lexapro) 20 mg tablet Take 1 tablet (20 mg) by mouth once daily. 3/1/24   " Mayda Carter MD   esomeprazole (NexIUM) 20 mg DR capsule Take 1 capsule (20 mg) by mouth 2 times a day. Do not open capsule. 12/6/24 12/6/25  Mayda Carter MD   gabapentin (Neurontin) 100 mg capsule Take 2 capsules (200 mg) by mouth 2 times a day. 5/15/25   Annie José MD   losartan-hydrochlorothiazide (Hyzaar) 100-25 mg tablet Take 1 tablet by mouth once daily. 3/20/25 9/16/25  Mayda Carter MD   montelukast (Singulair) 10 mg tablet Take 1 tablet (10 mg) by mouth once daily. 3/1/24   Mayda Carter MD   multivitamin with minerals iron-free (Centrum Silver) Take 1 tablet by mouth.    Historical Provider, MD   Myrbetriq 50 mg tablet extended release 24 hr 24 hr tablet Take 1 tablet (50 mg) by mouth once daily. 10/25/24 10/25/25  Yadira Fuentes MD   OXcarbazepine (Trileptal) 300 mg tablet 2 tablets 2 times daily 5/15/25   Annie José MD   traZODone (Desyrel) 100 mg tablet Take 1 tablet (100 mg) by mouth once daily at bedtime. 3/1/24   Mayda Carter MD        PAT ROS:   Constitutional:   neg    Neuro/Psych:   neg    Eyes:   neg    Ears:   neg    Nose:   neg    Mouth:   neg    Throat:    throat pain (from seizure meds and trigeminal neuralgia)  Neck:   neg    Cardio:    WALLIS  Respiratory:    shortness of breath  Endocrine:   neg    GI:   neg    :   neg    Musculoskeletal:    arthralgias (l wrist)  Hematologic:   neg     no history of blood transfusion  Skin:  neg        Physical Exam  Vitals reviewed. Physical exam within normal limits.   Constitutional:       General: He is not in acute distress.     Appearance: Normal appearance. He is normal weight.   HENT:      Head: Normocephalic.   Cardiovascular:      Rate and Rhythm: Normal rate.      Heart sounds: Normal heart sounds.   Pulmonary:      Effort: Pulmonary effort is normal.      Breath sounds: Normal breath sounds.   Musculoskeletal:         General: Normal range of motion.      Cervical back: Normal range of motion.   Skin:     General: Skin is warm  "and dry.   Neurological:      General: No focal deficit present.      Mental Status: He is alert and oriented to person, place, and time. Mental status is at baseline.   Psychiatric:         Behavior: Behavior normal.          PAT AIRWAY:   Airway:     Mallampati::  III    TM distance::  >3 FB    Neck ROM::  Full  normal         Visit Vitals  /68   Pulse 50   Temp 36.5 °C (97.7 °F)   Ht 1.88 m (6' 2\")   Wt 101 kg (223 lb)   SpO2 97%   BMI 28.63 kg/m²   Smoking Status Never   BSA 2.3 m²       DASI Risk Score      Flowsheet Row Pre-Admission Testing from 6/3/2025 in [facility] Questionnaire Series Submission from 5/11/2025 in [facility] [place] with Generic Provider Angeline   Can you take care of yourself (eat, dress, bathe, or use toilet)?  2.75 filed at 06/03/2025 0939 2.75  filed at 05/11/2025 1916   Can you walk indoors, such as around your house? 1.75 filed at 06/03/2025 0939 1.75  filed at 05/11/2025 1916   Can you walk a block or two on level ground?  2.75 filed at 06/03/2025 0939 2.75  filed at 05/11/2025 1916   Can you climb a flight of stairs or walk up a hill? 5.5 filed at 06/03/2025 0939 5.5  filed at 05/11/2025 1916   Can you run a short distance? 0 filed at 06/03/2025 0939 0  filed at 05/11/2025 1916   Can you do light work around the house like dusting or washing dishes? 2.7 filed at 06/03/2025 0939 2.7  filed at 05/11/2025 1916   Can you do moderate work around the house like vacuuming, sweeping floors or carrying groceries? 3.5 filed at 06/03/2025 0939 3.5  filed at 05/11/2025 1916   Can you do heavy work around the house like scrubbing floors or lifting and moving heavy furniture?  8 filed at 06/03/2025 0939 8  filed at 05/11/2025 1916   Can you do yard work like raking leaves, weeding or pushing a mower? 4.5 filed at 06/03/2025 0939 4.5  filed at 05/11/2025 1916   Can you have sexual relations? 0 filed at 06/03/2025 0939 5.25  filed at 05/11/2025 1916   Can " you participate in moderate recreational activities like golf, bowling, dancing, doubles tennis or throwing a baseball or football? 0 filed at 06/03/2025 0939 6  filed at 05/11/2025 1916   Can you participate in strenous sports like swimming, singles tennis, football, basketball, or skiing? 0 filed at 06/03/2025 0939 0  filed at 05/11/2025 1916   DASI SCORE 31.45 filed at 06/03/2025 0939 42.7  filed at 05/11/2025 1916   METS Score (Will be calculated only when all the questions are answered) 6.6 filed at 06/03/2025 0939 8  filed at 05/11/2025 1916          Caprini DVT Assessment      Flowsheet Row Pre-Admission Testing from 6/3/2025 in [facility]   DVT Score (IF A SCORE IS NOT CALCULATING, MUST SELECT A BMI TO COMPLETE) 8 filed at 06/03/2025 1404   Surgical Factors Major surgery planned, lasting over 3 hours filed at 06/03/2025 1404   BMI (BMI MUST BE CHOSEN) 30 or less filed at 06/03/2025 1404          Modified Frailty Index      Flowsheet Row Pre-Admission Testing from 6/3/2025 in [facility]   Non-independent functional status (problems with dressing, bathing, personal grooming, or cooking) 0 filed at 06/03/2025 1405   History of diabetes mellitus  0 filed at 06/03/2025 1405   History of COPD 0 filed at 06/03/2025 1405   History of CHF No filed at 06/03/2025 1405   History of MI 0.0909 filed at 06/03/2025 1405   History of Percutaneous Coronary Intervention, Cardiac Surgery, or Angina 0.0909 filed at 06/03/2025 1405   Hypertension requiring the use of medication  0.0909 filed at 06/03/2025 1405   Peripheral vascular disease 0 filed at 06/03/2025 1405   Impaired sensorium (cognitive impairement or loss, clouding, or delirium) 0 filed at 06/03/2025 1405   TIA or CVA withouy residual deficit 0 filed at 06/03/2025 1405   Cerebrovascular accident with deficit 0 filed at 06/03/2025 1405   Modified Frailty Index Calculator .2727 filed at 06/03/2025 1407          [id number] Stroke Risk  Points  Current as of just now        N/A 0 to 9 Points:      Last Change: N/A          The [id number] risk score (Lip RAQUEL, et al. 2009. © 2010 [employer]) quantifies the risk of stroke for a patient with atrial fibrillation. For patients without atrial fibrillation or under the age of 18 this score appears as N/A. Higher score values generally indicate higher risk of stroke.        This score is not applicable to this patient. Components are not calculated.          Revised Cardiac Risk Index      Flowsheet Row Pre-Admission Testing from 6/3/2025 in [facility]   High-Risk Surgery (Intraperitoneal, Intrathoracic,Suprainguinal vascular) 0 filed at 06/03/2025 1405   History of ischemic heart disease (History of MI, History of positive exercuse test, Current chest paint considered due to myocardial ischemia, Use of nitrate therapy, ECG with pathological Q Waves) 1 filed at 06/03/2025 1405   History of congestive heart failure (pulmonary edemia, bilateral rales or S3 gallop, Paroxysmal nocturnal dyspnea, CXR showing pulmonary vascular redistribution) 0 filed at 06/03/2025 1405   History of cerebrovascular disease (Prior TIA or stroke) 0 filed at 06/03/2025 1405   Pre-operative insulin treatment 0 filed at 06/03/2025 1405   Pre-operative creatinine>2 mg/dl 0 filed at 06/03/2025 1405   Revised Cardiac Risk Calculator 1 filed at 06/03/2025 1405          Apfel Simplified Score      Flowsheet Row Pre-Admission Testing from 6/3/2025 in [facility]   Smoking status 1 filed at 06/03/2025 1405   History of motion sickness or PONV  0 filed at 06/03/2025 1405   Use of postoperative opioids 1 filed at 06/03/2025 1405   Gender - Female 0=No filed at 06/03/2025 1405   Apfel Simplified Score Calculator 2 filed at 06/03/2025 1405          Risk Analysis Index Results This Encounter    No data found in the last 10 encounters.       Stop Bang Score      Flowsheet Row  Pre-Admission Testing from 6/3/2025 in [facility] Questionnaire Series Submission from 5/11/2025 in [facility] [place] with Generic Provider Angeline   Do you snore loudly? 0 filed at 06/03/2025 0940 0  filed at 05/11/2025 1916   Do you often feel tired or fatigued after your sleep? 1 filed at 06/03/2025 0940 0  filed at 05/11/2025 1916   Has anyone ever observed you stop breathing in your sleep? 0 filed at 06/03/2025 0940 0  filed at 05/11/2025 1916   Do you have or are you being treated for high blood pressure? 1 filed at 06/03/2025 0940 1  filed at 05/11/2025 1916   Recent BMI (Calculated) 28.6 filed at 06/03/2025 0940 31.4  filed at 05/11/2025 1916   Is BMI greater than 35 kg/m2? 0=No filed at 06/03/2025 0940 0=No  filed at 05/11/2025 1916   Age older than 50 years old? 1=Yes filed at 06/03/2025 0940 1=Yes  filed at 05/11/2025 1916   Is your neck circumference greater than 17 inches (Male) or 16 inches (Female)? 0 filed at 06/03/2025 0940 --   Gender - Male 1=Yes filed at 06/03/2025 0940 1=Yes  filed at 05/11/2025 1916   STOP-BANG Total Score 4 filed at 06/03/2025 0940 --          Prodigy: High Risk  Total Score: 20              Prodigy Age Score      Prodigy Gender Score          ARISCAT Score for Postoperative Pulmonary Complications      Flowsheet Row Pre-Admission Testing from 6/3/2025 in [facility]   Age Calculated Score 3 filed at 06/03/2025 1405   Preoperative SpO2 0 filed at 06/03/2025 1405   Respiratory infection in the last month Either upper or lower (i.e., URI, bronchitis, pneumonia), with fever and antibiotic treatment 0 filed at 06/03/2025 1405   Preoperative anemia (Hgb less than 10 g/dl) 0 filed at 06/03/2025 1405   Surgical incision  0 filed at 06/03/2025 1405   Duration of surgery  23 filed at 06/03/2025 1405   Emergency Procedure  0 filed at 06/03/2025 1405   ARISCAT Total Score  26 filed at 06/03/2025 1405          Miner Perioperative Risk  for Myocardial Infarction or Cardiac Arrest (FELTON)      Flowsheet Row Pre-Admission Testing from 6/3/2025 in [facility]   Calculated Age Score 1.5 filed at 06/03/2025 1405   Functional Status  0 filed at 06/03/2025 1405   ASA Class  -3.29 filed at 06/03/2025 1405   Creatinine 0.61 filed at 06/03/2025 1405   Type of Procedure  1.40 filed at 06/03/2025 1405   FELTON Total Score  -5.03 filed at 06/03/2025 1405   FELTON % 0.65 filed at 06/03/2025 1405          Assessment and Plan:    Anesthesia/airway:  No anesthesia complications    Neuro:  # Depression, Trigeminal neuralgia  -follows with PCP and neuro , medicated with Lexapro  for depression and Tegretol and gabapentin and Trileptal for the neuralgia with no relief no further perioperative intervention     alert and oriented x 3.  No cognitive decline.     The patient is at increased risk for perioperative delirium secondary to  age, depression, type and duration of surgery, Patient instructed on and provided cognitive exercises  --Brain exercise packet given to patient including word find, cross word, etc puzzles to be completed over the week leading up to the surgery.    Patient is at increased risk for perioperative CVA secondary to  CRF, cardiac disease, HTN, increased age, operative time > 2.5 hours    HEENT  # Seasonal allergies - controlled with Singulair and Claritin, take medications as prescribed, follows with PCP, no further preoperative testing/intervention indicated at this time.    Patient requires corrective lenses.  Denies dentures, missing teeth, broken teeth.  hearing loss with the use of hearing aids    Cardiovascular  #Hypertension, CAD, hyperlipidemia, MI in 2000- medicated with amlodipine, aspirin, atenolol, atorvastatin, Hyzaar and follows with cardiology. Discussed aspirin with Dr. Riddle through [employer] chat and he confirmed it is okay to continue to take it through day of surgery. Pt denies cardiovascular symptoms.  Patient denies  "exercise intolerance.  Hypertension is well-controlled. Physical exam is benign. METS is >4  and scheduled for non-cardiac surgery.  No additional preoperative testing is currently indicated.    Vital signs today are:  /68   Pulse 50   Temp 36.5 °C (97.7 °F)   Ht 1.88 m (6' 2\")   Wt 101 kg (223 lb)   SpO2 97%   BMI 28.63 kg/m²     METS: 6.6  RCRI: 1 point, 6.0% 30 day risk of MACE (risk for cardiac death, nonfatal myocardial infarction, and nonfactal cardiac arrest)  FELTON: 0.65% risk for 30-day postoperative MACE  EKG -March 6, 2025  Normal sinus rhythm  Normal ECG  When compared with ECG of 05-SEP-2024 14:54,  No significant change was found  Confirmed by Miguel Howell ([id number]) on 3/11/2025 8:51:49 AM     ECHO March 6, 2025   CONCLUSIONS:   1. The left ventricular systolic function is normal, with a Quinteros's biplane calculated ejection fraction of 68%.   2. Spectral Doppler shows a Grade I (impaired relaxation pattern) of left ventricular diastolic filling with normal left atrial filling pressure.   3. There is mildly reduced right ventricular systolic function.   4. The left atrium is mildly dilated.   5. Mild aortic valve stenosis.       CAC score 4/1/2025  IMPRESSION:  1. STENOSIS: Normal coronary anatomy with scattered coronary artery  atherosclerosis. Focal noncalcified plaque of dominant proximal  diagonal branch with estimated moderate stenosis (50-69%). Mild  nonobstructive atherosclerosis of distal left main coronary artery,  proximal LAD and distal RCA (up to 1-24% stenosis) CT FFR results  will be dictated as an addendum when available  2. PLAQUE BURDEN: Total coronary artery calcium score of 40.9,  indicating mild amount of calcified coronary plaque.  3. Status post Nissen fundoplication. Note is made of a small hiatal  hernia. The visualized esophagus appears mildly patulous with mild  concentric thickening in its distal aspect; correlate with concern  for reflux esophagitis.     Cardiology " follow up  4-9-25  Coronary artery disease involving native coronary artery of native heart, unspecified whether angina present  - EKG SB at 60bpm   - stress test negative for ischemia  - small area of scar consistent with previous MI  - concern that SOB is anginal equivalent. Coronary CTA showed 50-69% dx of diag. I reviewed patient's case with Dr. Howell. We will wait for heart flow results; however, it is unlikely this is the cause of SOB and likely not amenable to stent.      Pulmonary  No Pulmonary diagnosis or significant findings on chart review or clinical presentation and evaluation. Physical exam is benign, denies respiratory symptoms.  No further perioperative intervention.     Preoperative deep breathing educational handout provided to patient.    Patient is at increased risk of perioperative complications secondary to  age > 60, site of surgery, major surgery, duration of surgery > 2 hours, types of anesthetic    Stop Bang 4 point(s) which is a intermediate risk for moderate to severe MELINDA    --Referral to sleep medicine sent - Does not need to complete prior to procedure    ARISCAT: 26-44 points, 13.3% risk of in-hospital postoperative pulmonary complication         PRODIGY: 23 points which is a High risk for opioid-induced respiratory depression          Pumonary toilet education discussed, patient also provided deep breathing exercises and incentive spirometry educational handout       Renal  #CKD- last creatinine 1.48, baseline serum creatinine 1.3-1.5.  Follows with nephrology        Endocrine  No endocrine diagnosis or significant findings on chart review or clinical presentation and evaluation. No further testing or intervention is indicated at this time.    Pt denies steroids in last 6 months        Hematologic  No hematologic diagnosis or significant findings on chart review or clinical presentation and evaluation. No further testing or intervention is indicated at this time.    Patient confirms  that they will accept blood should they need it.     Caprini Score 8, patient at High risk for perioperative DVT.                      Patient provided VTE education/handout.      Gastrointestinal  #GERD -patient reports well-controlled GERD.   Patient is able to lie flat, no heartburn, no globus feeling in throat.  Denies GI/ symptoms.  Patient is medicated with omeprazole (continue).     Eat-10 score 0: self-perceived oropharyngeal dysphagia scale (0-40)     Apfel 2 points 39% risk for post operative N/V    Infectious disease  No infectious diagnosis or significant findings on chart review or clinical presentation and evaluation.     Prescription provided for CHG body wash and dental rinse. CHG use instructions reviewed and provided to patient.  Staph screen collected    Patient denies use of antibiotics in the past 3 months.    Musculoskeletal  #Arthritis - patient has history of arthritis.  In multiple joints.  Instructed to avoid NSAIDs 7 days prior to surgery.  Expresses understanding.  No further workup needed.      Orders  Labs & Imaging ordered and review:  Coags, MRSA, T&S, HbA1c,     Laboratory results  Recent Results (from the past 16 weeks)   ECG 12 lead (Clinic Performed)    Collection Time: 03/06/25  7:02 AM   Result Value Ref Range    Ventricular Rate 60 BPM    Atrial Rate 60 BPM    IA Interval 192 ms    QRS Duration 98 ms    QT Interval 402 ms    QTC Calculation(Bazett) 402 ms    P Axis 70 degrees    R Axis 19 degrees    T Axis 45 degrees    QRS Count 10 beats    Q Onset 212 ms    P Onset 116 ms    P Offset 139 ms    T Offset 413 ms    QTC Fredericia 402 ms   Transthoracic echo (TTE) complete    Collection Time: 03/06/25  7:49 AM   Result Value Ref Range    AV pk jenny 2.22 m/s    AV mn grad 11 mmHg    LVOT diam 2.23 cm    MV E/A ratio 0.81     LA vol index A/L 35.8 ml/m2    Tricuspid annular plane systolic excursion 2.0 cm    LV EF 68 %    RV free wall pk S' 8.00 cm/s    LVIDd 4.61 cm    RVSP 33.4  mmHg    Aortic Valve Area by Continuity of VTI 2.17 cm2    Aortic Valve Area by Continuity of Peak Velocity 2.15 cm2    AV pk grad 20 mmHg    LV A4C EF 75.2    Basic metabolic panel    Collection Time: 03/06/25  8:25 AM   Result Value Ref Range    GLUCOSE 76 65 - 99 mg/dL    UREA NITROGEN (BUN) 22 7 - 25 mg/dL    CREATININE 1.30 (H) 0.70 - 1.28 mg/dL    EGFR 57 (L) > OR = 60 mL/min/1.73m2    BUN/CREATININE RATIO 17 6 - 22 (calc)    SODIUM 143 135 - 146 mmol/L    POTASSIUM 4.4 3.5 - 5.3 mmol/L    CHLORIDE 104 98 - 110 mmol/L    CARBON DIOXIDE 25 20 - 32 mmol/L    ELECTROLYTE BALANCE 14 7 - 17 mmol/L (calc)    CALCIUM 9.5 8.6 - 10.3 mg/dL   Surgical Pathology Exam    Collection Time: 03/27/25 11:14 AM   Result Value Ref Range    Case Report       Surgical Pathology                                Case: [id number]                                  Authorizing Provider:  Myron Lewis MD           Collected:           03/27/2025 1114              Ordering Location:     [facility]       Received:            03/27/2025 1410                                     Center                                                                       Pathologist:           Kell Spencer MD                                                            Specimen:    ESOPHAGUS DISTAL BIOPSY                                                                    FINAL DIAGNOSIS       ESOPHAGUS, DISTAL, BIOPSY:  - Carrillo's mucosa, negative for dysplasia.  - Squamous mucosa with no significant abnormality.                  By the signature on this report, the individual or group listed as making the Final Interpretation/Diagnosis certifies that they have reviewed this case.       Gross Description       Received in formalin, labeled with the patient's name and hospital number, are multiple fragments of tan, soft tissue aggregating to 1.4 x 0.2 x 0.2 cm. The specimen is submitted in toto in one cassette.  JEK      Prostate Specific Antigen     Collection Time: 05/01/25  7:15 AM   Result Value Ref Range    PSA, TOTAL 5.04 (H) < OR = 4.00 ng/mL   Renal Function Panel    Collection Time: 05/01/25  7:18 AM   Result Value Ref Range    GLUCOSE 92 65 - 99 mg/dL    UREA NITROGEN (BUN) 32 (H) 7 - 25 mg/dL    CREATININE 1.48 (H) 0.70 - 1.28 mg/dL    EGFR 49 (L) > OR = 60 mL/min/1.73m2    BUN/CREATININE RATIO 22 6 - 22 (calc)    SODIUM 143 135 - 146 mmol/L    POTASSIUM 4.6 3.5 - 5.3 mmol/L    CHLORIDE 104 98 - 110 mmol/L    CARBON DIOXIDE 30 20 - 32 mmol/L    CALCIUM 9.6 8.6 - 10.3 mg/dL    PHOSPHATE (AS PHOSPHORUS) 4.0 2.1 - 4.3 mg/dL    ALBUMIN 4.4 3.6 - 5.1 g/dL   CBC and Auto Differential    Collection Time: 05/01/25  7:18 AM   Result Value Ref Range    WHITE BLOOD CELL COUNT 4.9 3.8 - 10.8 Thousand/uL    RED BLOOD CELL COUNT 4.03 (L) 4.20 - 5.80 Million/uL    HEMOGLOBIN 13.1 (L) 13.2 - 17.1 g/dL    HEMATOCRIT 38.8 38.5 - 50.0 %    MCV 96.3 80.0 - 100.0 fL    MCH 32.5 27.0 - 33.0 pg    MCHC 33.8 32.0 - 36.0 g/dL    RDW 13.1 11.0 - 15.0 %    PLATELET COUNT 156 140 - 400 Thousand/uL    MPV 10.3 7.5 - 12.5 fL    ABSOLUTE NEUTROPHILS 2,729 1,500 - 7,800 cells/uL    ABSOLUTE LYMPHOCYTES 1,441 850 - 3,900 cells/uL    ABSOLUTE MONOCYTES 495 200 - 950 cells/uL    ABSOLUTE EOSINOPHILS 176 15 - 500 cells/uL    ABSOLUTE BASOPHILS 59 0 - 200 cells/uL    NEUTROPHILS 55.7 %    LYMPHOCYTES 29.4 %    MONOCYTES 10.1 %    EOSINOPHILS 3.6 %    BASOPHILS 1.2 %   Oxcarbazepine level    Collection Time: 05/19/25  9:27 AM   Result Value Ref Range    10 HYDROXYCARBAZEPINE 34.0 8.0 - 35.0 mcg/mL   Coagulation Screen    Collection Time: 06/03/25 10:19 AM   Result Value Ref Range    Protime 11.9 9.8 - 12.4 seconds    INR 1.1 0.9 - 1.1    aPTT 31 26 - 36 seconds   Hemoglobin A1C    Collection Time: 06/03/25 10:19 AM   Result Value Ref Range    Hemoglobin A1C 5.5 See comment %    Estimated Average Glucose 111 Not Established mg/dL   Type And Screen Is this order related to pregnancy or an  upcoming surgery? Yes; Where will this surgery/delivery [facility]? [facility]; What is the date of the surgery? 6/13/2025; Has this patient ever had a transfusion? Unknown; ...    Collection Time: 06/03/25 10:19 AM   Result Value Ref Range    ABO TYPE O     Rh TYPE POS     ANTIBODY SCREEN NEG    CBC and Auto Differential    Collection Time: 06/03/25 10:19 AM   Result Value Ref Range    WBC 5.6 4.4 - 11.3 x10*3/uL    nRBC 0.0 0.0 - 0.0 /100 WBCs    RBC 4.29 (L) 4.50 - 5.90 x10*6/uL    Hemoglobin 13.4 (L) 13.5 - 17.5 g/dL    Hematocrit 41.2 41.0 - 52.0 %    MCV 96 80 - 100 fL    MCH 31.2 26.0 - 34.0 pg    MCHC 32.5 32.0 - 36.0 g/dL    RDW 13.1 11.5 - 14.5 %    Platelets 191 150 - 450 x10*3/uL    Neutrophils % 65.5 40.0 - 80.0 %    Immature Granulocytes %, Automated 0.2 0.0 - 0.9 %    Lymphocytes % 21.9 13.0 - 44.0 %    Monocytes % 10.0 2.0 - 10.0 %    Eosinophils % 2.0 0.0 - 6.0 %    Basophils % 0.4 0.0 - 2.0 %    Neutrophils Absolute 3.68 1.60 - 5.50 x10*3/uL    Immature Granulocytes Absolute, Automated 0.01 0.00 - 0.50 x10*3/uL    Lymphocytes Absolute 1.23 0.80 - 3.00 x10*3/uL    Monocytes Absolute 0.56 0.05 - 0.80 x10*3/uL    Eosinophils Absolute 0.11 0.00 - 0.40 x10*3/uL    Basophils Absolute 0.02 0.00 - 0.10 x10*3/uL      Nares pending    Other  Hold all vitamins and supplements 7 days prior to surgery  Tylenol okay to continue, please hold Aleve/naproxen/ibuprofen (NSAIDs) for 7 days prior to surgery  No lotion/moisturizers or Deoderant after last shower prior to surgery    Medication, NPO, and all other CPM instructions were reviewed with the patient.  All patient questions were addressed.    Yaa Garza, APRN-CNP           [1]   Past Medical History:  Diagnosis Date    Abnormal findings on diagnostic imaging of other parts of musculoskeletal system 09/21/2016    Abnormal bone radiograph    Agatston coronary artery calcium score less than 100     4/1/25--coronary artery calcium score of  40.9,    Arthritis     left wrist    BPH (benign prostatic hyperplasia)     s/p TURP 07/2020 and PVP 05/05/2022    CKD (chronic kidney disease)     seen by Hamzah Chino CNP 05/09/25    Coronary artery disease     Cutaneous abscess of neck 09/23/2021    Neck abscess    Cyst of kidney, acquired 12/01/2015    Renal cyst    Depression     GERD (gastroesophageal reflux disease)     s/p Nissen fundoplication in 1986 s/p esophagoscopy and dilation to 32mm on 10/17/24    Hearing aid worn     bilateral    HL (hearing loss)     Hyperlipidemia     Hypertension     Nephrolithiasis     Old myocardial infarction     myocardial infarction-2000    Personal history of other diseases of the digestive system     History of hiatal hernia    Personal history of other mental and behavioral disorders     History of depression    Personal history of other specified conditions     History of chest pain    Prostate cancer (Multi)     Kd 6 prostate cancer on active surveillance    Shortness of breath     Trigeminal neuralgia     seen by Dr. Rohit Riddle 04/23/25    Vision loss    [2]   Past Surgical History:  Procedure Laterality Date    CARDIAC CATHETERIZATION      COLONOSCOPY      ESOPHAGOGASTRODUODENOSCOPY      GASTRIC FUNDOPLICATION  02/25/2015    Esophagogastric Fundoplasty Nissen Fundoplication    HERNIA REPAIR      HIP ARTHROPLASTY Right     OTHER SURGICAL HISTORY  06/11/2013    Wrist Surgery    OTHER SURGICAL HISTORY  09/23/2021    Neck surgery    OTHER SURGICAL HISTORY  09/23/2021    Transurethral resection of prostate    PROSTATE SURGERY      ROTATOR CUFF REPAIR  06/11/2013    Rotator Cuff Repair    US GUIDED ABSCESS DRAIN  02/10/2017    US GUIDED ABSCESS DRAIN 2/10/2017 [facility]ICAL LEGAC[facility]    US GUIDED PERCUTANEOUS PERITONEAL OR RETROPERITONEAL FLUID COLLECTION DRAINAGE  02/10/2017    US GUIDED PERCUTANEOUS PERITONEAL OR RETROPERITONEAL FLUID COLLECTION DRAINAGE 2/10/2017 [facility] CLINICAL LEGACY   [3]   Family History  Problem  Relation Name Age of Onset    Other (heart trouble) Mother      Kidney disease Father      Heart disease Father      Other (hypoglycemia) Father      Other (heart trouble) Father      Liver cancer Paternal Grandfather     [4] No Known Allergies

## 2025-06-03 NOTE — H&P (VIEW-ONLY)
"CPM/PAT Evaluation       Name: Adebayo Puentes (Adebayo Puentes \"Ed\")  /Age: 1950/74 y.o.       Visit Type:   In-Person       Chief Complaint: perioperative evaluation    HPI    Adebayo Puentes is a 74 y.o. male scheduled for Microvascular Decompression with Lumbar Drain Placement - Right on 2025 secondary to trigeminal neuralgia with Dr. Riddle who referred to [facility].  Presents to [facility] today for perioperative risk stratification and optimization. PMHx includes CAD, hyperlipidemia, hypertension, MI in , hearing loss with use of hearing aids, GERD, CKD, nephrolithiasis, BPH, prostate cancer, depression, arthritis, trigeminal neuralgia.      Medical History[1]    Surgical History[2]    Patient Sexual activity questions deferred to the physician.    Family History[3]    Allergies[4]    Prior to Admission medications    Medication Sig Start Date End Date Taking? Authorizing Provider   amLODIPine (Norvasc) 10 mg tablet Take 1 tablet (10 mg) by mouth once daily. 3/1/24   Mayda Carter MD   ascorbic acid, vitamin C, 1,000 mg ER tablet Take by mouth.    Historical Provider, MD   aspirin 81 mg EC tablet Take 1 tablet (81 mg) by mouth once daily.  Patient not taking: Reported on 2025    Historical Provider, MD   atenolol (Tenormin) 25 mg tablet Take 1 tablet (25 mg) by mouth once daily. 3/1/24   Mayda Carter MD   atorvastatin (Lipitor) 40 mg tablet Take 1 tablet (40 mg) by mouth once daily. 24  Mayda Carter MD   calcium carbonate-vitamin D3 (Caltrate with Vitamin D3) 600 mg-20 mcg (800 unit) tablet Take by mouth twice a day.    Historical Provider, MD   carBAMazepine (TEGretol) 100 mg chewable tablet Take 300 mg in the AM, and 400 mg at night 3/20/25   Lyssa Lo MD   cholecalciferol (Vitamin D3) 5,000 Units tablet Take 1 tablet (125 mcg) by mouth 1 (one) time per week.    Historical Provider, MD   escitalopram (Lexapro) 20 mg tablet Take 1 tablet (20 mg) by mouth once daily. 3/1/24   " Mayda Carter MD   esomeprazole (NexIUM) 20 mg DR capsule Take 1 capsule (20 mg) by mouth 2 times a day. Do not open capsule. 12/6/24 12/6/25  Mayda Carter MD   gabapentin (Neurontin) 100 mg capsule Take 2 capsules (200 mg) by mouth 2 times a day. 5/15/25   Annie José MD   losartan-hydrochlorothiazide (Hyzaar) 100-25 mg tablet Take 1 tablet by mouth once daily. 3/20/25 9/16/25  Mayda Carter MD   montelukast (Singulair) 10 mg tablet Take 1 tablet (10 mg) by mouth once daily. 3/1/24   Mayda Carter MD   multivitamin with minerals iron-free (Centrum Silver) Take 1 tablet by mouth.    Historical Provider, MD   Myrbetriq 50 mg tablet extended release 24 hr 24 hr tablet Take 1 tablet (50 mg) by mouth once daily. 10/25/24 10/25/25  Yadira Fuentes MD   OXcarbazepine (Trileptal) 300 mg tablet 2 tablets 2 times daily 5/15/25   Annie José MD   traZODone (Desyrel) 100 mg tablet Take 1 tablet (100 mg) by mouth once daily at bedtime. 3/1/24   Mayda Carter MD        PAT ROS:   Constitutional:   neg    Neuro/Psych:   neg    Eyes:   neg    Ears:   neg    Nose:   neg    Mouth:   neg    Throat:    throat pain (from seizure meds and trigeminal neuralgia)  Neck:   neg    Cardio:    WALLIS  Respiratory:    shortness of breath  Endocrine:   neg    GI:   neg    :   neg    Musculoskeletal:    arthralgias (l wrist)  Hematologic:   neg     no history of blood transfusion  Skin:  neg        Physical Exam  Vitals reviewed. Physical exam within normal limits.   Constitutional:       General: He is not in acute distress.     Appearance: Normal appearance. He is normal weight.   HENT:      Head: Normocephalic.   Cardiovascular:      Rate and Rhythm: Normal rate.      Heart sounds: Normal heart sounds.   Pulmonary:      Effort: Pulmonary effort is normal.      Breath sounds: Normal breath sounds.   Musculoskeletal:         General: Normal range of motion.      Cervical back: Normal range of motion.   Skin:     General: Skin is warm  "and dry.   Neurological:      General: No focal deficit present.      Mental Status: He is alert and oriented to person, place, and time. Mental status is at baseline.   Psychiatric:         Behavior: Behavior normal.          PAT AIRWAY:   Airway:     Mallampati::  III    TM distance::  >3 FB    Neck ROM::  Full  normal         Visit Vitals  /68   Pulse 50   Temp 36.5 °C (97.7 °F)   Ht 1.88 m (6' 2\")   Wt 101 kg (223 lb)   SpO2 97%   BMI 28.63 kg/m²   Smoking Status Never   BSA 2.3 m²       DASI Risk Score      Flowsheet Row Pre-Admission Testing from 6/3/2025 in [facility] Questionnaire Series Submission from 5/11/2025 in [facility] [place] with Generic Provider Angeline   Can you take care of yourself (eat, dress, bathe, or use toilet)?  2.75 filed at 06/03/2025 0939 2.75  filed at 05/11/2025 1916   Can you walk indoors, such as around your house? 1.75 filed at 06/03/2025 0939 1.75  filed at 05/11/2025 1916   Can you walk a block or two on level ground?  2.75 filed at 06/03/2025 0939 2.75  filed at 05/11/2025 1916   Can you climb a flight of stairs or walk up a hill? 5.5 filed at 06/03/2025 0939 5.5  filed at 05/11/2025 1916   Can you run a short distance? 0 filed at 06/03/2025 0939 0  filed at 05/11/2025 1916   Can you do light work around the house like dusting or washing dishes? 2.7 filed at 06/03/2025 0939 2.7  filed at 05/11/2025 1916   Can you do moderate work around the house like vacuuming, sweeping floors or carrying groceries? 3.5 filed at 06/03/2025 0939 3.5  filed at 05/11/2025 1916   Can you do heavy work around the house like scrubbing floors or lifting and moving heavy furniture?  8 filed at 06/03/2025 0939 8  filed at 05/11/2025 1916   Can you do yard work like raking leaves, weeding or pushing a mower? 4.5 filed at 06/03/2025 0939 4.5  filed at 05/11/2025 1916   Can you have sexual relations? 0 filed at 06/03/2025 0939 5.25  filed at 05/11/2025 1916   Can " you participate in moderate recreational activities like golf, bowling, dancing, doubles tennis or throwing a baseball or football? 0 filed at 06/03/2025 0939 6  filed at 05/11/2025 1916   Can you participate in strenous sports like swimming, singles tennis, football, basketball, or skiing? 0 filed at 06/03/2025 0939 0  filed at 05/11/2025 1916   DASI SCORE 31.45 filed at 06/03/2025 0939 42.7  filed at 05/11/2025 1916   METS Score (Will be calculated only when all the questions are answered) 6.6 filed at 06/03/2025 0939 8  filed at 05/11/2025 1916          Caprini DVT Assessment      Flowsheet Row Pre-Admission Testing from 6/3/2025 in [facility]   DVT Score (IF A SCORE IS NOT CALCULATING, MUST SELECT A BMI TO COMPLETE) 8 filed at 06/03/2025 1404   Surgical Factors Major surgery planned, lasting over 3 hours filed at 06/03/2025 1404   BMI (BMI MUST BE CHOSEN) 30 or less filed at 06/03/2025 1404          Modified Frailty Index      Flowsheet Row Pre-Admission Testing from 6/3/2025 in [facility]   Non-independent functional status (problems with dressing, bathing, personal grooming, or cooking) 0 filed at 06/03/2025 1405   History of diabetes mellitus  0 filed at 06/03/2025 1405   History of COPD 0 filed at 06/03/2025 1405   History of CHF No filed at 06/03/2025 1405   History of MI 0.0909 filed at 06/03/2025 1405   History of Percutaneous Coronary Intervention, Cardiac Surgery, or Angina 0.0909 filed at 06/03/2025 1405   Hypertension requiring the use of medication  0.0909 filed at 06/03/2025 1405   Peripheral vascular disease 0 filed at 06/03/2025 1405   Impaired sensorium (cognitive impairement or loss, clouding, or delirium) 0 filed at 06/03/2025 1405   TIA or CVA withouy residual deficit 0 filed at 06/03/2025 1405   Cerebrovascular accident with deficit 0 filed at 06/03/2025 1405   Modified Frailty Index Calculator .[id number] filed at 06/03/2025 140          [id number] Stroke Risk  Points  Current as of just now        N/A 0 to 9 Points:      Last Change: N/A          The [id number] risk score (Lip RAQUEL, et al. 2009. © 2010 [employer]) quantifies the risk of stroke for a patient with atrial fibrillation. For patients without atrial fibrillation or under the age of 18 this score appears as N/A. Higher score values generally indicate higher risk of stroke.        This score is not applicable to this patient. Components are not calculated.          Revised Cardiac Risk Index      Flowsheet Row Pre-Admission Testing from 6/3/2025 in [facility]   High-Risk Surgery (Intraperitoneal, Intrathoracic,Suprainguinal vascular) 0 filed at 06/03/2025 1405   History of ischemic heart disease (History of MI, History of positive exercuse test, Current chest paint considered due to myocardial ischemia, Use of nitrate therapy, ECG with pathological Q Waves) 1 filed at 06/03/2025 1405   History of congestive heart failure (pulmonary edemia, bilateral rales or S3 gallop, Paroxysmal nocturnal dyspnea, CXR showing pulmonary vascular redistribution) 0 filed at 06/03/2025 1405   History of cerebrovascular disease (Prior TIA or stroke) 0 filed at 06/03/2025 1405   Pre-operative insulin treatment 0 filed at 06/03/2025 1405   Pre-operative creatinine>2 mg/dl 0 filed at 06/03/2025 1405   Revised Cardiac Risk Calculator 1 filed at 06/03/2025 1405          Apfel Simplified Score      Flowsheet Row Pre-Admission Testing from 6/3/2025 in [facility]   Smoking status 1 filed at 06/03/2025 1405   History of motion sickness or PONV  0 filed at 06/03/2025 1405   Use of postoperative opioids 1 filed at 06/03/2025 1405   Gender - Female 0=No filed at 06/03/2025 1405   Apfel Simplified Score Calculator 2 filed at 06/03/2025 1405          Risk Analysis Index Results This Encounter    No data found in the last 10 encounters.       Stop Bang Score      Flowsheet Row  Pre-Admission Testing from 6/3/2025 in [facility] Questionnaire Series Submission from 5/11/2025 in [facility] [place] with Generic Provider Angeline   Do you snore loudly? 0 filed at 06/03/2025 0940 0  filed at 05/11/2025 1916   Do you often feel tired or fatigued after your sleep? 1 filed at 06/03/2025 0940 0  filed at 05/11/2025 1916   Has anyone ever observed you stop breathing in your sleep? 0 filed at 06/03/2025 0940 0  filed at 05/11/2025 1916   Do you have or are you being treated for high blood pressure? 1 filed at 06/03/2025 0940 1  filed at 05/11/2025 1916   Recent BMI (Calculated) 28.6 filed at 06/03/2025 0940 31.4  filed at 05/11/2025 1916   Is BMI greater than 35 kg/m2? 0=No filed at 06/03/2025 0940 0=No  filed at 05/11/2025 1916   Age older than 50 years old? 1=Yes filed at 06/03/2025 0940 1=Yes  filed at 05/11/2025 1916   Is your neck circumference greater than 17 inches (Male) or 16 inches (Female)? 0 filed at 06/03/2025 0940 --   Gender - Male 1=Yes filed at 06/03/2025 0940 1=Yes  filed at 05/11/2025 1916   STOP-BANG Total Score 4 filed at 06/03/2025 0940 --          Prodigy: High Risk  Total Score: 20              Prodigy Age Score      Prodigy Gender Score          ARISCAT Score for Postoperative Pulmonary Complications      Flowsheet Row Pre-Admission Testing from 6/3/2025 in [facility]   Age Calculated Score 3 filed at 06/03/2025 1405   Preoperative SpO2 0 filed at 06/03/2025 1405   Respiratory infection in the last month Either upper or lower (i.e., URI, bronchitis, pneumonia), with fever and antibiotic treatment 0 filed at 06/03/2025 1405   Preoperative anemia (Hgb less than 10 g/dl) 0 filed at 06/03/2025 1405   Surgical incision  0 filed at 06/03/2025 1405   Duration of surgery  23 filed at 06/03/2025 1405   Emergency Procedure  0 filed at 06/03/2025 1405   ARISCAT Total Score  26 filed at 06/03/2025 1405          Miner Perioperative Risk  for Myocardial Infarction or Cardiac Arrest (FELTON)      Flowsheet Row Pre-Admission Testing from 6/3/2025 in [facility]3]   Calculated Age Score 1.5 filed at 06/03/2025 1405   Functional Status  0 filed at 06/03/2025 1405   ASA Class  -3.29 filed at 06/03/2025 1405   Creatinine 0.61 filed at 06/03/2025 1405   Type of Procedure  1.40 filed at 06/03/2025 1405   FELTON Total Score  -5.03 filed at 06/03/2025 1405   FELTON % 0.65 filed at 06/03/2025 1405          Assessment and Plan:    Anesthesia/airway:  No anesthesia complications    Neuro:  # Depression, Trigeminal neuralgia  -follows with PCP and neuro , medicated with Lexapro  for depression and Tegretol and gabapentin and Trileptal for the neuralgia with no relief no further perioperative intervention     alert and oriented x 3.  No cognitive decline.     The patient is at increased risk for perioperative delirium secondary to  age, depression, type and duration of surgery, Patient instructed on and provided cognitive exercises  --Brain exercise packet given to patient including word find, cross word, etc puzzles to be completed over the week leading up to the surgery.    Patient is at increased risk for perioperative CVA secondary to  CRF, cardiac disease, HTN, increased age, operative time > 2.5 hours    HEENT  # Seasonal allergies - controlled with Singulair and Claritin, take medications as prescribed, follows with PCP, no further preoperative testing/intervention indicated at this time.    Patient requires corrective lenses.  Denies dentures, missing teeth, broken teeth.  hearing loss with the use of hearing aids    Cardiovascular  #Hypertension, CAD, hyperlipidemia, MI in 2000- medicated with amlodipine, aspirin, atenolol, atorvastatin, Hyzaar and follows with cardiology. Discussed aspirin with Dr. Riddle through [employer] chat and he confirmed it is okay to continue to take it through day of surgery. Pt denies cardiovascular symptoms.  Patient denies  "exercise intolerance.  Hypertension is well-controlled. Physical exam is benign. METS is >4  and scheduled for non-cardiac surgery.  No additional preoperative testing is currently indicated.    Vital signs today are:  /68   Pulse 50   Temp 36.5 °C (97.7 °F)   Ht 1.88 m (6' 2\")   Wt 101 kg (223 lb)   SpO2 97%   BMI 28.63 kg/m²     METS: 6.6  RCRI: 1 point, 6.0% 30 day risk of MACE (risk for cardiac death, nonfatal myocardial infarction, and nonfactal cardiac arrest)  FELTON: 0.65% risk for 30-day postoperative MACE  EKG -March 6, 2025  Normal sinus rhythm  Normal ECG  When compared with ECG of 05-SEP-2024 14:54,  No significant change was found  Confirmed by Miguel Howell ([id number]) on 3/11/2025 8:51:49 AM     ECHO March 6, 2025   CONCLUSIONS:   1. The left ventricular systolic function is normal, with a Quinteros's biplane calculated ejection fraction of 68%.   2. Spectral Doppler shows a Grade I (impaired relaxation pattern) of left ventricular diastolic filling with normal left atrial filling pressure.   3. There is mildly reduced right ventricular systolic function.   4. The left atrium is mildly dilated.   5. Mild aortic valve stenosis.       CAC score 4/1/2025  IMPRESSION:  1. STENOSIS: Normal coronary anatomy with scattered coronary artery  atherosclerosis. Focal noncalcified plaque of dominant proximal  diagonal branch with estimated moderate stenosis (50-69%). Mild  nonobstructive atherosclerosis of distal left main coronary artery,  proximal LAD and distal RCA (up to 1-24% stenosis) CT FFR results  will be dictated as an addendum when available  2. PLAQUE BURDEN: Total coronary artery calcium score of 40.9,  indicating mild amount of calcified coronary plaque.  3. Status post Nissen fundoplication. Note is made of a small hiatal  hernia. The visualized esophagus appears mildly patulous with mild  concentric thickening in its distal aspect; correlate with concern  for reflux esophagitis.     Cardiology " follow up  4-9-25  Coronary artery disease involving native coronary artery of native heart, unspecified whether angina present  - EKG SB at 60bpm   - stress test negative for ischemia  - small area of scar consistent with previous MI  - concern that SOB is anginal equivalent. Coronary CTA showed 50-69% dx of diag. I reviewed patient's case with Dr. Howell. We will wait for heart flow results; however, it is unlikely this is the cause of SOB and likely not amenable to stent.      Pulmonary  No Pulmonary diagnosis or significant findings on chart review or clinical presentation and evaluation. Physical exam is benign, denies respiratory symptoms.  No further perioperative intervention.     Preoperative deep breathing educational handout provided to patient.    Patient is at increased risk of perioperative complications secondary to  age > 60, site of surgery, major surgery, duration of surgery > 2 hours, types of anesthetic    Stop Bang 4 point(s) which is a intermediate risk for moderate to severe MELINDA    --Referral to sleep medicine sent - Does not need to complete prior to procedure    ARISCAT: 26-44 points, 13.3% risk of in-hospital postoperative pulmonary complication         PRODIGY: 23 points which is a High risk for opioid-induced respiratory depression          Pumonary toilet education discussed, patient also provided deep breathing exercises and incentive spirometry educational handout       Renal  #CKD- last creatinine 1.48, baseline serum creatinine 1.3-1.5.  Follows with nephrology        Endocrine  No endocrine diagnosis or significant findings on chart review or clinical presentation and evaluation. No further testing or intervention is indicated at this time.    Pt denies steroids in last 6 months        Hematologic  No hematologic diagnosis or significant findings on chart review or clinical presentation and evaluation. No further testing or intervention is indicated at this time.    Patient confirms  that they will accept blood should they need it.     Caprini Score 8, patient at High risk for perioperative DVT.                      Patient provided VTE education/handout.      Gastrointestinal  #GERD -patient reports well-controlled GERD.   Patient is able to lie flat, no heartburn, no globus feeling in throat.  Denies GI/ symptoms.  Patient is medicated with omeprazole (continue).     Eat-10 score 0: self-perceived oropharyngeal dysphagia scale (0-40)     Apfel 2 points 39% risk for post operative N/V    Infectious disease  No infectious diagnosis or significant findings on chart review or clinical presentation and evaluation.     Prescription provided for CHG body wash and dental rinse. CHG use instructions reviewed and provided to patient.  Staph screen collected    Patient denies use of antibiotics in the past 3 months.    Musculoskeletal  #Arthritis - patient has history of arthritis.  In multiple joints.  Instructed to avoid NSAIDs 7 days prior to surgery.  Expresses understanding.  No further workup needed.      Orders  Labs & Imaging ordered and review:  Coags, MRSA, T&S, HbA1c,     Laboratory results  Recent Results (from the past 16 weeks)   ECG 12 lead (Clinic Performed)    Collection Time: 03/06/25  7:02 AM   Result Value Ref Range    Ventricular Rate 60 BPM    Atrial Rate 60 BPM    DC Interval 192 ms    QRS Duration 98 ms    QT Interval 402 ms    QTC Calculation(Bazett) 402 ms    P Axis 70 degrees    R Axis 19 degrees    T Axis 45 degrees    QRS Count 10 beats    Q Onset 212 ms    P Onset 116 ms    P Offset 139 ms    T Offset 413 ms    QTC Fredericia 402 ms   Transthoracic echo (TTE) complete    Collection Time: 03/06/25  7:49 AM   Result Value Ref Range    AV pk jenny 2.22 m/s    AV mn grad 11 mmHg    LVOT diam 2.23 cm    MV E/A ratio 0.81     LA vol index A/L 35.8 ml/m2    Tricuspid annular plane systolic excursion 2.0 cm    LV EF 68 %    RV free wall pk S' 8.00 cm/s    LVIDd 4.61 cm    RVSP 33.4  mmHg    Aortic Valve Area by Continuity of VTI 2.17 cm2    Aortic Valve Area by Continuity of Peak Velocity 2.15 cm2    AV pk grad 20 mmHg    LV A4C EF 75.2    Basic metabolic panel    Collection Time: 03/06/25  8:25 AM   Result Value Ref Range    GLUCOSE 76 65 - 99 mg/dL    UREA NITROGEN (BUN) 22 7 - 25 mg/dL    CREATININE 1.30 (H) 0.70 - 1.28 mg/dL    EGFR 57 (L) > OR = 60 mL/min/1.73m2    BUN/CREATININE RATIO 17 6 - 22 (calc)    SODIUM 143 135 - 146 mmol/L    POTASSIUM 4.4 3.5 - 5.3 mmol/L    CHLORIDE 104 98 - 110 mmol/L    CARBON DIOXIDE 25 20 - 32 mmol/L    ELECTROLYTE BALANCE 14 7 - 17 mmol/L (calc)    CALCIUM 9.5 8.6 - 10.3 mg/dL   Surgical Pathology Exam    Collection Time: 03/27/25 11:14 AM   Result Value Ref Range    Case Report       Surgical Pathology                                Case: [id number]                                  Authorizing Provider:  Myron Lewis MD           Collected:           03/27/2025 1114              Ordering Location:     [facility]       Received:            03/27/2025 1410                                     Center                                                                       Pathologist:           Kell Spencer MD                                                            Specimen:    ESOPHAGUS DISTAL BIOPSY                                                                    FINAL DIAGNOSIS       ESOPHAGUS, DISTAL, BIOPSY:  - Carrillo's mucosa, negative for dysplasia.  - Squamous mucosa with no significant abnormality.                  By the signature on this report, the individual or group listed as making the Final Interpretation/Diagnosis certifies that they have reviewed this case.       Gross Description       Received in formalin, labeled with the patient's name and hospital number, are multiple fragments of tan, soft tissue aggregating to 1.4 x 0.2 x 0.2 cm. The specimen is submitted in toto in one cassette.  JEK      Prostate Specific Antigen     Collection Time: 05/01/25  7:15 AM   Result Value Ref Range    PSA, TOTAL 5.04 (H) < OR = 4.00 ng/mL   Renal Function Panel    Collection Time: 05/01/25  7:18 AM   Result Value Ref Range    GLUCOSE 92 65 - 99 mg/dL    UREA NITROGEN (BUN) 32 (H) 7 - 25 mg/dL    CREATININE 1.48 (H) 0.70 - 1.28 mg/dL    EGFR 49 (L) > OR = 60 mL/min/1.73m2    BUN/CREATININE RATIO 22 6 - 22 (calc)    SODIUM 143 135 - 146 mmol/L    POTASSIUM 4.6 3.5 - 5.3 mmol/L    CHLORIDE 104 98 - 110 mmol/L    CARBON DIOXIDE 30 20 - 32 mmol/L    CALCIUM 9.6 8.6 - 10.3 mg/dL    PHOSPHATE (AS PHOSPHORUS) 4.0 2.1 - 4.3 mg/dL    ALBUMIN 4.4 3.6 - 5.1 g/dL   CBC and Auto Differential    Collection Time: 05/01/25  7:18 AM   Result Value Ref Range    WHITE BLOOD CELL COUNT 4.9 3.8 - 10.8 Thousand/uL    RED BLOOD CELL COUNT 4.03 (L) 4.20 - 5.80 Million/uL    HEMOGLOBIN 13.1 (L) 13.2 - 17.1 g/dL    HEMATOCRIT 38.8 38.5 - 50.0 %    MCV 96.3 80.0 - 100.0 fL    MCH 32.5 27.0 - 33.0 pg    MCHC 33.8 32.0 - 36.0 g/dL    RDW 13.1 11.0 - 15.0 %    PLATELET COUNT 156 140 - 400 Thousand/uL    MPV 10.3 7.5 - 12.5 fL    ABSOLUTE NEUTROPHILS 2,729 1,500 - 7,800 cells/uL    ABSOLUTE LYMPHOCYTES 1,441 850 - 3,900 cells/uL    ABSOLUTE MONOCYTES 495 200 - 950 cells/uL    ABSOLUTE EOSINOPHILS 176 15 - 500 cells/uL    ABSOLUTE BASOPHILS 59 0 - 200 cells/uL    NEUTROPHILS 55.7 %    LYMPHOCYTES 29.4 %    MONOCYTES 10.1 %    EOSINOPHILS 3.6 %    BASOPHILS 1.2 %   Oxcarbazepine level    Collection Time: 05/19/25  9:27 AM   Result Value Ref Range    10 HYDROXYCARBAZEPINE 34.0 8.0 - 35.0 mcg/mL   Coagulation Screen    Collection Time: 06/03/25 10:19 AM   Result Value Ref Range    Protime 11.9 9.8 - 12.4 seconds    INR 1.1 0.9 - 1.1    aPTT 31 26 - 36 seconds   Hemoglobin A1C    Collection Time: 06/03/25 10:19 AM   Result Value Ref Range    Hemoglobin A1C 5.5 See comment %    Estimated Average Glucose 111 Not Established mg/dL   Type And Screen Is this order related to pregnancy or an  upcoming surgery? Yes; Where will this surgery/delivery be performed? [facility]; What is the date of the surgery? 6/13/2025; Has this patient ever had a transfusion? Unknown; ...    Collection Time: 06/03/25 10:19 AM   Result Value Ref Range    ABO TYPE O     Rh TYPE POS     ANTIBODY SCREEN NEG    CBC and Auto Differential    Collection Time: 06/03/25 10:19 AM   Result Value Ref Range    WBC 5.6 4.4 - 11.3 x10*3/uL    nRBC 0.0 0.0 - 0.0 /100 WBCs    RBC 4.29 (L) 4.50 - 5.90 x10*6/uL    Hemoglobin 13.4 (L) 13.5 - 17.5 g/dL    Hematocrit 41.2 41.0 - 52.0 %    MCV 96 80 - 100 fL    MCH 31.2 26.0 - 34.0 pg    MCHC 32.5 32.0 - 36.0 g/dL    RDW 13.1 11.5 - 14.5 %    Platelets 191 150 - 450 x10*3/uL    Neutrophils % 65.5 40.0 - 80.0 %    Immature Granulocytes %, Automated 0.2 0.0 - 0.9 %    Lymphocytes % 21.9 13.0 - 44.0 %    Monocytes % 10.0 2.0 - 10.0 %    Eosinophils % 2.0 0.0 - 6.0 %    Basophils % 0.4 0.0 - 2.0 %    Neutrophils Absolute 3.68 1.60 - 5.50 x10*3/uL    Immature Granulocytes Absolute, Automated 0.01 0.00 - 0.50 x10*3/uL    Lymphocytes Absolute 1.23 0.80 - 3.00 x10*3/uL    Monocytes Absolute 0.56 0.05 - 0.80 x10*3/uL    Eosinophils Absolute 0.11 0.00 - 0.40 x10*3/uL    Basophils Absolute 0.02 0.00 - 0.10 x10*3/uL      Nares pending    Other  Hold all vitamins and supplements 7 days prior to surgery  Tylenol okay to continue, please hold Aleve/naproxen/ibuprofen (NSAIDs) for 7 days prior to surgery  No lotion/moisturizers or Deoderant after last shower prior to surgery    Medication, NPO, and all other CPM instructions were reviewed with the patient.  All patient questions were addressed.    Yaa Garza, APRN-CNP           [1]   Past Medical History:  Diagnosis Date    Abnormal findings on diagnostic imaging of other parts of musculoskeletal system 09/21/2016    Abnormal bone radiograph    Agatston coronary artery calcium score less than 100     4/1/25--coronary artery calcium score of  40.9,    Arthritis     left wrist    BPH (benign prostatic hyperplasia)     s/p TURP 07/2020 and PVP 05/05/2022    CKD (chronic kidney disease)     seen by Hamzah Chino CNP 05/09/25    Coronary artery disease     Cutaneous abscess of neck 09/23/2021    Neck abscess    Cyst of kidney, acquired 12/01/2015    Renal cyst    Depression     GERD (gastroesophageal reflux disease)     s/p Nissen fundoplication in 1986 s/p esophagoscopy and dilation to 32mm on 10/17/24    Hearing aid worn     bilateral    HL (hearing loss)     Hyperlipidemia     Hypertension     Nephrolithiasis     Old myocardial infarction     myocardial infarction-2000    Personal history of other diseases of the digestive system     History of hiatal hernia    Personal history of other mental and behavioral disorders     History of depression    Personal history of other specified conditions     History of chest pain    Prostate cancer (Multi)     Kd 6 prostate cancer on active surveillance    Shortness of breath     Trigeminal neuralgia     seen by Dr. Rohit Riddle 04/23/25    Vision loss    [2]   Past Surgical History:  Procedure Laterality Date    CARDIAC CATHETERIZATION      COLONOSCOPY      ESOPHAGOGASTRODUODENOSCOPY      GASTRIC FUNDOPLICATION  02/25/2015    Esophagogastric Fundoplasty Nissen Fundoplication    HERNIA REPAIR      HIP ARTHROPLASTY Right     OTHER SURGICAL HISTORY  06/11/2013    Wrist Surgery    OTHER SURGICAL HISTORY  09/23/2021    Neck surgery    OTHER SURGICAL HISTORY  09/23/2021    Transurethral resection of prostate    PROSTATE SURGERY      ROTATOR CUFF REPAIR  06/11/2013    Rotator Cuff Repair    US GUIDED ABSCESS DRAIN  02/10/2017    US GUIDED ABSCESS DRAIN 2/10/2017 [facility] CLINICAL LEGAC[facility]    US GUIDED PERCUTANEOUS PERITONEAL OR RETROPERITONEAL FLUID COLLECTION DRAINAGE  02/10/2017    US GUIDED PERCUTANEOUS PERITONEAL OR RETROPERITONEAL FLUID COLLECTION DRAINAGE 2/10/2017 [facility] CLINICAL LEGACY   [3]   Family History  Problem  Relation Name Age of Onset    Other (heart trouble) Mother      Kidney disease Father      Heart disease Father      Other (hypoglycemia) Father      Other (heart trouble) Father      Liver cancer Paternal Grandfather     [4] No Known Allergies

## 2025-06-04 LAB — STAPHYLOCOCCUS SPEC CULT: NORMAL

## 2025-06-06 ENCOUNTER — APPOINTMENT (OUTPATIENT)
Dept: PRIMARY CARE | Facility: CLINIC | Age: 75
End: 2025-06-06
Payer: MEDICARE

## 2025-06-06 VITALS
DIASTOLIC BLOOD PRESSURE: 78 MMHG | SYSTOLIC BLOOD PRESSURE: 128 MMHG | HEIGHT: 75 IN | TEMPERATURE: 97.6 F | HEART RATE: 81 BPM | BODY MASS INDEX: 27.6 KG/M2 | WEIGHT: 222 LBS

## 2025-06-06 DIAGNOSIS — I10 HYPERTENSION, UNSPECIFIED TYPE: ICD-10-CM

## 2025-06-06 DIAGNOSIS — I10 ESSENTIAL (PRIMARY) HYPERTENSION: ICD-10-CM

## 2025-06-06 DIAGNOSIS — E78.5 HYPERLIPIDEMIA, UNSPECIFIED HYPERLIPIDEMIA TYPE: ICD-10-CM

## 2025-06-06 DIAGNOSIS — E78.2 MIXED HYPERLIPIDEMIA: Primary | ICD-10-CM

## 2025-06-06 DIAGNOSIS — I25.10 CORONARY ARTERY DISEASE INVOLVING NATIVE CORONARY ARTERY OF NATIVE HEART, UNSPECIFIED WHETHER ANGINA PRESENT: ICD-10-CM

## 2025-06-06 DIAGNOSIS — R73.01 IMPAIRED FASTING BLOOD SUGAR: ICD-10-CM

## 2025-06-06 DIAGNOSIS — K22.70 BARRETT'S ESOPHAGUS WITHOUT DYSPLASIA: ICD-10-CM

## 2025-06-06 PROBLEM — E10.42: Status: RESOLVED | Noted: 2023-10-13 | Resolved: 2025-06-06

## 2025-06-06 PROBLEM — I20.0 ANGINA PECTORIS, UNSTABLE (MULTI): Status: RESOLVED | Noted: 2025-03-06 | Resolved: 2025-06-06

## 2025-06-06 PROCEDURE — 99214 OFFICE O/P EST MOD 30 MIN: CPT | Performed by: INTERNAL MEDICINE

## 2025-06-06 PROCEDURE — 1036F TOBACCO NON-USER: CPT | Performed by: INTERNAL MEDICINE

## 2025-06-06 PROCEDURE — G2211 COMPLEX E/M VISIT ADD ON: HCPCS | Performed by: INTERNAL MEDICINE

## 2025-06-06 PROCEDURE — 1160F RVW MEDS BY RX/DR IN RCRD: CPT | Performed by: INTERNAL MEDICINE

## 2025-06-06 PROCEDURE — 3078F DIAST BP <80 MM HG: CPT | Performed by: INTERNAL MEDICINE

## 2025-06-06 PROCEDURE — 1159F MED LIST DOCD IN RCRD: CPT | Performed by: INTERNAL MEDICINE

## 2025-06-06 PROCEDURE — 3074F SYST BP LT 130 MM HG: CPT | Performed by: INTERNAL MEDICINE

## 2025-06-06 NOTE — PROGRESS NOTES
"Subjective   Reason for Visit: Adebayo Puentes is an 74 y.o. male here for  f/u        HPI    Overall well  Better diet, increased fruit/protein/vegs.  Some less carbs. On soft diet due to TN.    #1 ventral hernia-status post remote repair , no pain.  No change in bowels  #2 CAD- no CP, SOB, PND/orthopnea  #3 HTN-    #4 renal stones/BPH/cysts- no issues recently  #5 lipids-remains on treatment without difficulty.    #6 CKD stage 3 - no edema, no NSAIDS.  Recently saw a nephrologist-note 5/9/2025 reviewed.  #7 hearing loss-    #8 RC- s/p surgery. doing well.  Very minimal range of motion issues, no pain.   #9 hip fracture- resolved.  No pain  #10 globus sensation-resolved  #11 fatigue- fair  #12 depression-    #13 fall-remote, no recent issues  #14 neck mass- resolved.    #15 insomnia/mood- .   #16 BPH-marked in the work  #17 TN- a bigger issue recently.  S/p remote GammaKnife --> decompression pending  #18 IFBS- diet better, little exercise    Patient Care Team:  Mayda Carter MD as PCP - General  Mayda Carter MD as PCP [facility]  Mayda Carter MD as Primary Care Provider  Virginia Delgado MD as Surgeon (Otolaryngology)     Review of Systems    Objective   Vitals:  /78   Pulse 81   Temp 36.4 °C (97.6 °F)   Ht 1.905 m (6' 3\")   Wt 101 kg (222 lb)   BMI 27.75 kg/m²       Physical Exam  Constitutional:       Appearance: Normal appearance.   Cardiovascular:      Rate and Rhythm: Normal rate and regular rhythm.      Pulses: Normal pulses.      Heart sounds: No murmur heard.     No gallop.   Pulmonary:      Effort: Pulmonary effort is normal. No respiratory distress.      Breath sounds: Normal breath sounds. No wheezing, rhonchi or rales.   Neurological:      Mental Status: He is alert.   Psychiatric:         Mood and Affect: Mood normal.         Behavior: Behavior normal.         Thought Content: Thought content normal.         Judgment: Judgment normal.         Lab Results   Component Value " Date    WBC 5.6 06/03/2025    HGB 13.4 (L) 06/03/2025    HCT 41.2 06/03/2025     06/03/2025    CHOL 136 03/06/2024    TRIG 83 03/06/2024    HDL 46.1 03/06/2024    ALT 39 03/06/2024    AST 34 09/08/2023     05/01/2025    K 4.6 05/01/2025     05/01/2025    CREATININE 1.48 (H) 05/01/2025    BUN 32 (H) 05/01/2025    CO2 30 05/01/2025    TSH 3.32 08/18/2022    PSA 5.04 (H) 05/01/2025    INR 1.1 06/03/2025    HGBA1C 5.5 06/03/2025       Assessment & Plan     #1 ventral hernia-status post remote repair, doing well. f/u surgery PRN  #2 CAD- class 1 dz. Remote neg stress. f//u w/ Dr Howell- qyear.  #3 HTN- good. con't rx. f/u nephrology.  #4 renal stones/BPH/cysts-stable. f/u , appt pending  #5 lipids-good. Recheck    #6 CKD stage 3 - f/u nephrology. Reviewed no NSAIDs.   #7 hearing loss- stable. f/u ENT  #8 RC- s/p surgery. doing well. f/u ortho.   #9 hip fracture- resolved.   #10 globus sensation- s/p ENT. f/u ENT  #11 fatigue- better  #12 depression- good. con't Rx.   #13 fall- balance much better, reviewed  #14 neck mass- resolved. f/u ENT PRN.   #15 insomnia/mood- good  #16 BPH- ok now, Follow-up urology   #17 TN-  bigger issue, surgery pending  #18 IFBS- good.  reviewed at length. diet/exercise reviewed. retest ha1c 6 mths.  #19 GERD/Carrillo's- reviewed.  f/u  GI pending .  S/p EGD 3/25  #20 prostate CA- f/u    Full code  last colo 9/21--> is due for colonoscopy 2026. Reviewed.

## 2025-06-12 ENCOUNTER — ANESTHESIA EVENT (OUTPATIENT)
Dept: OPERATING ROOM | Facility: HOSPITAL | Age: 75
End: 2025-06-12
Payer: MEDICARE

## 2025-06-12 DIAGNOSIS — C61 PROSTATE CANCER (MULTI): ICD-10-CM

## 2025-06-12 RX ORDER — HYDROGEN PEROXIDE 3 %
20 SOLUTION, NON-ORAL MISCELLANEOUS 2 TIMES DAILY
COMMUNITY

## 2025-06-12 NOTE — PROGRESS NOTES
"Pharmacy Medication History Review    Adebayo Puentes \"Ed\" is a 74 y.o. male who is planned to be admitted for Trigeminal neuralgia. Pharmacy called the patient prior to their scheduled procedure and reviewed the patient's npqiy-tx-uokrpsarf medications for accuracy.    Medications ADDED:  Esomeprazole 20mg  Medications CHANGED:  Omeprazole 20mg TO esomeprazole 20mg  Cholecalciferol 5000u directions from #1QW TO #1QD  Medications REMOVED:   Omeprazole 20mg  Carbamazepine 100mg chewable    Please review updated prior to admission medication list and comments regarding how patient may be taking medications differently by going to Admission tab --> Admission Orders --> Admit Orders / Review prior to admission medications.     Preferred pharmacy, last doses of medications, and allergies to be confirmed with patient by nursing the day of procedure.     Sources used to complete the med history include:  [facility]  Pharmacy dispense history  Patient Interview Good historian  Chart Review  Care Everywhere     Below are additional concerns with the patient's PTA list.  Patient states they are taking #1 capsule of esomeprazole 20mg twice daily. L.F. 03/10/25 #170/90d  Patient states they are no longer taking carbamazepine 100mg. They only take oxcarbazepine 300mg #2BID. L.F. 05/21/25 #360/90d    Mary Lazo [facility]  Please reach out via Secure Chat for questions   "

## 2025-06-13 ENCOUNTER — ANESTHESIA (OUTPATIENT)
Dept: OPERATING ROOM | Facility: HOSPITAL | Age: 75
End: 2025-06-13
Payer: MEDICARE

## 2025-06-13 ENCOUNTER — HOSPITAL ENCOUNTER (INPATIENT)
Facility: HOSPITAL | Age: 75
LOS: 3 days | Discharge: HOME | DRG: 027 | End: 2025-06-16
Attending: NEUROLOGICAL SURGERY | Admitting: NEUROLOGICAL SURGERY
Payer: MEDICARE

## 2025-06-13 DIAGNOSIS — G50.0 RIGHT TRIGEMINAL NEURALGIA: ICD-10-CM

## 2025-06-13 DIAGNOSIS — G50.0 TRIGEMINAL NEURALGIA: ICD-10-CM

## 2025-06-13 DIAGNOSIS — G89.18 ACUTE POSTOPERATIVE PAIN: Primary | ICD-10-CM

## 2025-06-13 DIAGNOSIS — Z74.09 IMPAIRED MOBILITY AND ENDURANCE: ICD-10-CM

## 2025-06-13 LAB — GLUCOSE BLD MANUAL STRIP-MCNC: 136 MG/DL (ref 74–99)

## 2025-06-13 PROCEDURE — 2060000001 HC INTERMEDIATE ICU ROOM DAILY

## 2025-06-13 PROCEDURE — 2500000001 HC RX 250 WO HCPCS SELF ADMINISTERED DRUGS (ALT 637 FOR MEDICARE OP): Performed by: STUDENT IN AN ORGANIZED HEALTH CARE EDUCATION/TRAINING PROGRAM

## 2025-06-13 PROCEDURE — 2500000005 HC RX 250 GENERAL PHARMACY W/O HCPCS

## 2025-06-13 PROCEDURE — C1763 CONN TISS, NON-HUMAN: HCPCS | Performed by: NEUROLOGICAL SURGERY

## 2025-06-13 PROCEDURE — 3600000005 HC OR TIME - INITIAL BASE CHARGE - PROCEDURE LEVEL FIVE: Performed by: NEUROLOGICAL SURGERY

## 2025-06-13 PROCEDURE — 2500000002 HC RX 250 W HCPCS SELF ADMINISTERED DRUGS (ALT 637 FOR MEDICARE OP, ALT 636 FOR OP/ED): Performed by: STUDENT IN AN ORGANIZED HEALTH CARE EDUCATION/TRAINING PROGRAM

## 2025-06-13 PROCEDURE — 7100000001 HC RECOVERY ROOM TIME - INITIAL BASE CHARGE: Performed by: NEUROLOGICAL SURGERY

## 2025-06-13 PROCEDURE — 3700000001 HC GENERAL ANESTHESIA TIME - INITIAL BASE CHARGE: Performed by: NEUROLOGICAL SURGERY

## 2025-06-13 PROCEDURE — 3600000010 HC OR TIME - EACH INCREMENTAL 1 MINUTE - PROCEDURE LEVEL FIVE: Performed by: NEUROLOGICAL SURGERY

## 2025-06-13 PROCEDURE — 2780000003 HC OR 278 NO HCPCS: Performed by: NEUROLOGICAL SURGERY

## 2025-06-13 PROCEDURE — 3700000002 HC GENERAL ANESTHESIA TIME - EACH INCREMENTAL 1 MINUTE: Performed by: NEUROLOGICAL SURGERY

## 2025-06-13 PROCEDURE — 82947 ASSAY GLUCOSE BLOOD QUANT: CPT

## 2025-06-13 PROCEDURE — 2500000005 HC RX 250 GENERAL PHARMACY W/O HCPCS: Performed by: NURSE PRACTITIONER

## 2025-06-13 PROCEDURE — 2500000004 HC RX 250 GENERAL PHARMACY W/ HCPCS (ALT 636 FOR OP/ED): Performed by: NEUROLOGICAL SURGERY

## 2025-06-13 PROCEDURE — 2720000007 HC OR 272 NO HCPCS: Performed by: NEUROLOGICAL SURGERY

## 2025-06-13 PROCEDURE — 69990 MICROSURGERY ADD-ON: CPT | Performed by: NEUROLOGICAL SURGERY

## 2025-06-13 PROCEDURE — 36415 COLL VENOUS BLD VENIPUNCTURE: CPT | Performed by: NEUROLOGICAL SURGERY

## 2025-06-13 PROCEDURE — C1713 ANCHOR/SCREW BN/BN,TIS/BN: HCPCS | Performed by: NEUROLOGICAL SURGERY

## 2025-06-13 PROCEDURE — 7100000002 HC RECOVERY ROOM TIME - EACH INCREMENTAL 1 MINUTE: Performed by: NEUROLOGICAL SURGERY

## 2025-06-13 PROCEDURE — 2500000005 HC RX 250 GENERAL PHARMACY W/O HCPCS: Performed by: NEUROLOGICAL SURGERY

## 2025-06-13 PROCEDURE — 61458 CRNEC SOPL XPL/DCMPR CRL NRV: CPT | Performed by: NEUROLOGICAL SURGERY

## 2025-06-13 PROCEDURE — 2500000004 HC RX 250 GENERAL PHARMACY W/ HCPCS (ALT 636 FOR OP/ED)

## 2025-06-13 PROCEDURE — 00NK0ZZ RELEASE TRIGEMINAL NERVE, OPEN APPROACH: ICD-10-PCS | Performed by: NEUROLOGICAL SURGERY

## 2025-06-13 PROCEDURE — 2500000005 HC RX 250 GENERAL PHARMACY W/O HCPCS: Performed by: ANESTHESIOLOGY

## 2025-06-13 DEVICE — DURAGEN® PLUS DURAL REGENERATION MATRIX, 1 IN X 1 IN (2.5 CM X 2.5 CM)
Type: IMPLANTABLE DEVICE | Site: CRANIAL | Status: FUNCTIONAL
Brand: DURAGEN® PLUS

## 2025-06-13 DEVICE — IMPLANTABLE DEVICE: Type: IMPLANTABLE DEVICE | Site: CRANIAL | Status: FUNCTIONAL

## 2025-06-13 RX ORDER — LIDOCAINE HYDROCHLORIDE 10 MG/ML
0.1 INJECTION, SOLUTION INFILTRATION; PERINEURAL ONCE
Status: DISCONTINUED | OUTPATIENT
Start: 2025-06-13 | End: 2025-06-13 | Stop reason: HOSPADM

## 2025-06-13 RX ORDER — ACETAMINOPHEN 325 MG/1
650 TABLET ORAL EVERY 4 HOURS PRN
Status: DISCONTINUED | OUTPATIENT
Start: 2025-06-13 | End: 2025-06-13 | Stop reason: HOSPADM

## 2025-06-13 RX ORDER — PHENYLEPHRINE HCL IN 0.9% NACL 0.4MG/10ML
SYRINGE (ML) INTRAVENOUS AS NEEDED
Status: DISCONTINUED | OUTPATIENT
Start: 2025-06-13 | End: 2025-06-13

## 2025-06-13 RX ORDER — ESCITALOPRAM OXALATE 10 MG/1
20 TABLET ORAL DAILY
Status: DISCONTINUED | OUTPATIENT
Start: 2025-06-13 | End: 2025-06-16 | Stop reason: HOSPADM

## 2025-06-13 RX ORDER — HYDROMORPHONE HYDROCHLORIDE 1 MG/ML
INJECTION, SOLUTION INTRAMUSCULAR; INTRAVENOUS; SUBCUTANEOUS AS NEEDED
Status: DISCONTINUED | OUTPATIENT
Start: 2025-06-13 | End: 2025-06-13

## 2025-06-13 RX ORDER — PROPOFOL 10 MG/ML
INJECTION, EMULSION INTRAVENOUS AS NEEDED
Status: DISCONTINUED | OUTPATIENT
Start: 2025-06-13 | End: 2025-06-13

## 2025-06-13 RX ORDER — NALOXONE HYDROCHLORIDE 0.4 MG/ML
0.2 INJECTION, SOLUTION INTRAMUSCULAR; INTRAVENOUS; SUBCUTANEOUS EVERY 5 MIN PRN
Status: DISCONTINUED | OUTPATIENT
Start: 2025-06-13 | End: 2025-06-16 | Stop reason: HOSPADM

## 2025-06-13 RX ORDER — ONDANSETRON HYDROCHLORIDE 2 MG/ML
4 INJECTION, SOLUTION INTRAVENOUS ONCE AS NEEDED
Status: DISCONTINUED | OUTPATIENT
Start: 2025-06-13 | End: 2025-06-13 | Stop reason: HOSPADM

## 2025-06-13 RX ORDER — LIDOCAINE HYDROCHLORIDE AND EPINEPHRINE 5; 5 MG/ML; UG/ML
INJECTION, SOLUTION INFILTRATION; PERINEURAL AS NEEDED
Status: DISCONTINUED | OUTPATIENT
Start: 2025-06-13 | End: 2025-06-13 | Stop reason: HOSPADM

## 2025-06-13 RX ORDER — HEPARIN SODIUM 5000 [USP'U]/ML
5000 INJECTION, SOLUTION INTRAVENOUS; SUBCUTANEOUS EVERY 8 HOURS
Status: DISCONTINUED | OUTPATIENT
Start: 2025-06-15 | End: 2025-06-16 | Stop reason: HOSPADM

## 2025-06-13 RX ORDER — FENTANYL CITRATE 50 UG/ML
INJECTION, SOLUTION INTRAMUSCULAR; INTRAVENOUS AS NEEDED
Status: DISCONTINUED | OUTPATIENT
Start: 2025-06-13 | End: 2025-06-13

## 2025-06-13 RX ORDER — ACETAMINOPHEN 325 MG/1
650 TABLET ORAL EVERY 6 HOURS
Status: DISCONTINUED | OUTPATIENT
Start: 2025-06-13 | End: 2025-06-16 | Stop reason: HOSPADM

## 2025-06-13 RX ORDER — OXCARBAZEPINE 600 MG/1
600 TABLET, FILM COATED ORAL 2 TIMES DAILY
Status: DISCONTINUED | OUTPATIENT
Start: 2025-06-13 | End: 2025-06-16 | Stop reason: HOSPADM

## 2025-06-13 RX ORDER — DROPERIDOL 2.5 MG/ML
0.62 INJECTION, SOLUTION INTRAMUSCULAR; INTRAVENOUS ONCE AS NEEDED
Status: DISCONTINUED | OUTPATIENT
Start: 2025-06-13 | End: 2025-06-13 | Stop reason: HOSPADM

## 2025-06-13 RX ORDER — LABETALOL HYDROCHLORIDE 5 MG/ML
5 INJECTION, SOLUTION INTRAVENOUS ONCE AS NEEDED
Status: DISCONTINUED | OUTPATIENT
Start: 2025-06-13 | End: 2025-06-13 | Stop reason: HOSPADM

## 2025-06-13 RX ORDER — OXYCODONE HYDROCHLORIDE 10 MG/1
10 TABLET ORAL EVERY 4 HOURS PRN
Status: DISCONTINUED | OUTPATIENT
Start: 2025-06-13 | End: 2025-06-16 | Stop reason: HOSPADM

## 2025-06-13 RX ORDER — BACITRACIN 500 [USP'U]/G
OINTMENT TOPICAL AS NEEDED
Status: DISCONTINUED | OUTPATIENT
Start: 2025-06-13 | End: 2025-06-13 | Stop reason: HOSPADM

## 2025-06-13 RX ORDER — CHOLECALCIFEROL (VITAMIN D3) 25 MCG
125 TABLET ORAL DAILY
Status: DISCONTINUED | OUTPATIENT
Start: 2025-06-13 | End: 2025-06-16 | Stop reason: HOSPADM

## 2025-06-13 RX ORDER — OXCARBAZEPINE 300 MG/1
300 TABLET, FILM COATED ORAL 2 TIMES DAILY
Status: ON HOLD | COMMUNITY
End: 2025-06-13 | Stop reason: WASHOUT

## 2025-06-13 RX ORDER — LABETALOL HYDROCHLORIDE 5 MG/ML
10 INJECTION, SOLUTION INTRAVENOUS EVERY 10 MIN PRN
Status: DISCONTINUED | OUTPATIENT
Start: 2025-06-13 | End: 2025-06-16 | Stop reason: HOSPADM

## 2025-06-13 RX ORDER — ONDANSETRON HYDROCHLORIDE 2 MG/ML
4 INJECTION, SOLUTION INTRAVENOUS EVERY 8 HOURS PRN
Status: DISCONTINUED | OUTPATIENT
Start: 2025-06-13 | End: 2025-06-16 | Stop reason: HOSPADM

## 2025-06-13 RX ORDER — PSYLLIUM HUSK 0.4 G
1 CAPSULE ORAL DAILY
Status: DISCONTINUED | OUTPATIENT
Start: 2025-06-13 | End: 2025-06-16 | Stop reason: HOSPADM

## 2025-06-13 RX ORDER — LIDOCAINE HYDROCHLORIDE 20 MG/ML
INJECTION, SOLUTION INFILTRATION; PERINEURAL AS NEEDED
Status: DISCONTINUED | OUTPATIENT
Start: 2025-06-13 | End: 2025-06-13

## 2025-06-13 RX ORDER — GABAPENTIN 100 MG/1
200 CAPSULE ORAL 2 TIMES DAILY
Status: DISCONTINUED | OUTPATIENT
Start: 2025-06-13 | End: 2025-06-16 | Stop reason: HOSPADM

## 2025-06-13 RX ORDER — POLYETHYLENE GLYCOL 3350 17 G/17G
17 POWDER, FOR SOLUTION ORAL DAILY
Status: DISCONTINUED | OUTPATIENT
Start: 2025-06-13 | End: 2025-06-16 | Stop reason: HOSPADM

## 2025-06-13 RX ORDER — OXYBUTYNIN CHLORIDE 5 MG/1
5 TABLET ORAL 3 TIMES DAILY
Status: DISCONTINUED | OUTPATIENT
Start: 2025-06-13 | End: 2025-06-16 | Stop reason: HOSPADM

## 2025-06-13 RX ORDER — SODIUM CHLORIDE, SODIUM LACTATE, POTASSIUM CHLORIDE, CALCIUM CHLORIDE 600; 310; 30; 20 MG/100ML; MG/100ML; MG/100ML; MG/100ML
100 INJECTION, SOLUTION INTRAVENOUS CONTINUOUS
Status: DISCONTINUED | OUTPATIENT
Start: 2025-06-13 | End: 2025-06-13 | Stop reason: HOSPADM

## 2025-06-13 RX ORDER — ASCORBIC ACID 500 MG
1000 TABLET ORAL DAILY
Status: DISCONTINUED | OUTPATIENT
Start: 2025-06-13 | End: 2025-06-16 | Stop reason: HOSPADM

## 2025-06-13 RX ORDER — ATORVASTATIN CALCIUM 40 MG/1
40 TABLET, FILM COATED ORAL DAILY
Status: DISCONTINUED | OUTPATIENT
Start: 2025-06-13 | End: 2025-06-16 | Stop reason: HOSPADM

## 2025-06-13 RX ORDER — OXYCODONE HYDROCHLORIDE 5 MG/1
5 TABLET ORAL EVERY 4 HOURS PRN
Status: DISCONTINUED | OUTPATIENT
Start: 2025-06-13 | End: 2025-06-16 | Stop reason: HOSPADM

## 2025-06-13 RX ORDER — CYCLOBENZAPRINE HCL 10 MG
10 TABLET ORAL 3 TIMES DAILY PRN
Status: DISCONTINUED | OUTPATIENT
Start: 2025-06-13 | End: 2025-06-16 | Stop reason: HOSPADM

## 2025-06-13 RX ORDER — ATENOLOL 25 MG/1
25 TABLET ORAL DAILY
Status: DISCONTINUED | OUTPATIENT
Start: 2025-06-13 | End: 2025-06-16 | Stop reason: HOSPADM

## 2025-06-13 RX ORDER — TRAZODONE HYDROCHLORIDE 50 MG/1
100 TABLET ORAL NIGHTLY
Status: DISCONTINUED | OUTPATIENT
Start: 2025-06-13 | End: 2025-06-16 | Stop reason: HOSPADM

## 2025-06-13 RX ORDER — ONDANSETRON HYDROCHLORIDE 2 MG/ML
INJECTION, SOLUTION INTRAVENOUS AS NEEDED
Status: DISCONTINUED | OUTPATIENT
Start: 2025-06-13 | End: 2025-06-13

## 2025-06-13 RX ORDER — ASPIRIN 81 MG/1
81 TABLET ORAL DAILY
Status: DISCONTINUED | OUTPATIENT
Start: 2025-06-14 | End: 2025-06-16 | Stop reason: HOSPADM

## 2025-06-13 RX ORDER — HYDROMORPHONE HYDROCHLORIDE 0.2 MG/ML
0.2 INJECTION INTRAMUSCULAR; INTRAVENOUS; SUBCUTANEOUS EVERY 5 MIN PRN
Status: DISCONTINUED | OUTPATIENT
Start: 2025-06-13 | End: 2025-06-13 | Stop reason: HOSPADM

## 2025-06-13 RX ORDER — ROCURONIUM BROMIDE 10 MG/ML
INJECTION, SOLUTION INTRAVENOUS AS NEEDED
Status: DISCONTINUED | OUTPATIENT
Start: 2025-06-13 | End: 2025-06-13

## 2025-06-13 RX ORDER — PAPAVERINE HYDROCHLORIDE 30 MG/ML
INJECTION INTRAMUSCULAR; INTRAVENOUS AS NEEDED
Status: DISCONTINUED | OUTPATIENT
Start: 2025-06-13 | End: 2025-06-13 | Stop reason: HOSPADM

## 2025-06-13 RX ORDER — POLYMYXIN B 500000 [USP'U]/1
INJECTION, POWDER, LYOPHILIZED, FOR SOLUTION INTRAMUSCULAR; INTRATHECAL; INTRAVENOUS; OPHTHALMIC AS NEEDED
Status: DISCONTINUED | OUTPATIENT
Start: 2025-06-13 | End: 2025-06-13 | Stop reason: HOSPADM

## 2025-06-13 RX ORDER — NORETHINDRONE AND ETHINYL ESTRADIOL 0.5-0.035
KIT ORAL AS NEEDED
Status: DISCONTINUED | OUTPATIENT
Start: 2025-06-13 | End: 2025-06-13

## 2025-06-13 RX ORDER — CEFAZOLIN SODIUM 2 G/100ML
2 INJECTION, SOLUTION INTRAVENOUS ONCE
Status: DISCONTINUED | OUTPATIENT
Start: 2025-06-13 | End: 2025-06-13 | Stop reason: HOSPADM

## 2025-06-13 RX ORDER — ACETAMINOPHEN 10 MG/ML
INJECTION, SOLUTION INTRAVENOUS AS NEEDED
Status: DISCONTINUED | OUTPATIENT
Start: 2025-06-13 | End: 2025-06-13

## 2025-06-13 RX ORDER — SODIUM CHLORIDE 0.9 G/100ML
INJECTION, SOLUTION IRRIGATION AS NEEDED
Status: DISCONTINUED | OUTPATIENT
Start: 2025-06-13 | End: 2025-06-13 | Stop reason: HOSPADM

## 2025-06-13 RX ORDER — SODIUM CHLORIDE, SODIUM LACTATE, POTASSIUM CHLORIDE, CALCIUM CHLORIDE 600; 310; 30; 20 MG/100ML; MG/100ML; MG/100ML; MG/100ML
INJECTION, SOLUTION INTRAVENOUS CONTINUOUS PRN
Status: DISCONTINUED | OUTPATIENT
Start: 2025-06-13 | End: 2025-06-13

## 2025-06-13 RX ORDER — CEFAZOLIN 1 G/1
INJECTION, POWDER, FOR SOLUTION INTRAVENOUS AS NEEDED
Status: DISCONTINUED | OUTPATIENT
Start: 2025-06-13 | End: 2025-06-13

## 2025-06-13 RX ORDER — AMLODIPINE BESYLATE 10 MG/1
10 TABLET ORAL DAILY
Status: DISCONTINUED | OUTPATIENT
Start: 2025-06-13 | End: 2025-06-16 | Stop reason: HOSPADM

## 2025-06-13 RX ORDER — HYDROMORPHONE HYDROCHLORIDE 0.2 MG/ML
0.1 INJECTION INTRAMUSCULAR; INTRAVENOUS; SUBCUTANEOUS EVERY 5 MIN PRN
Status: DISCONTINUED | OUTPATIENT
Start: 2025-06-13 | End: 2025-06-13 | Stop reason: HOSPADM

## 2025-06-13 RX ORDER — MONTELUKAST SODIUM 10 MG/1
10 TABLET ORAL DAILY
Status: DISCONTINUED | OUTPATIENT
Start: 2025-06-13 | End: 2025-06-16 | Stop reason: HOSPADM

## 2025-06-13 RX ORDER — SODIUM CHLORIDE 9 MG/ML
75 INJECTION, SOLUTION INTRAVENOUS CONTINUOUS
Status: DISCONTINUED | OUTPATIENT
Start: 2025-06-13 | End: 2025-06-16 | Stop reason: HOSPADM

## 2025-06-13 RX ORDER — HYDRALAZINE HYDROCHLORIDE 20 MG/ML
10 INJECTION INTRAMUSCULAR; INTRAVENOUS
Status: DISCONTINUED | OUTPATIENT
Start: 2025-06-13 | End: 2025-06-16 | Stop reason: HOSPADM

## 2025-06-13 RX ORDER — PANTOPRAZOLE SODIUM 20 MG/1
20 TABLET, DELAYED RELEASE ORAL
Status: DISCONTINUED | OUTPATIENT
Start: 2025-06-14 | End: 2025-06-16 | Stop reason: HOSPADM

## 2025-06-13 RX ORDER — AMOXICILLIN 250 MG
2 CAPSULE ORAL 2 TIMES DAILY
Status: DISCONTINUED | OUTPATIENT
Start: 2025-06-13 | End: 2025-06-16 | Stop reason: HOSPADM

## 2025-06-13 RX ORDER — ONDANSETRON 4 MG/1
4 TABLET, ORALLY DISINTEGRATING ORAL EVERY 8 HOURS PRN
Status: DISCONTINUED | OUTPATIENT
Start: 2025-06-13 | End: 2025-06-16 | Stop reason: HOSPADM

## 2025-06-13 RX ADMIN — PROPOFOL 170 MG: 10 INJECTION, EMULSION INTRAVENOUS at 10:33

## 2025-06-13 RX ADMIN — CEFAZOLIN 2 G: 1 INJECTION, POWDER, FOR SOLUTION INTRAMUSCULAR; INTRAVENOUS at 11:00

## 2025-06-13 RX ADMIN — ROCURONIUM BROMIDE 30 MG: 10 INJECTION, SOLUTION INTRAVENOUS at 12:08

## 2025-06-13 RX ADMIN — TRAZODONE HYDROCHLORIDE 100 MG: 50 TABLET ORAL at 21:05

## 2025-06-13 RX ADMIN — CYCLOBENZAPRINE 10 MG: 10 TABLET, FILM COATED ORAL at 18:56

## 2025-06-13 RX ADMIN — PROPOFOL 50 MG: 10 INJECTION, EMULSION INTRAVENOUS at 11:06

## 2025-06-13 RX ADMIN — ACETAMINOPHEN 650 MG: 325 TABLET, FILM COATED ORAL at 18:55

## 2025-06-13 RX ADMIN — ROCURONIUM BROMIDE 30 MG: 10 INJECTION, SOLUTION INTRAVENOUS at 13:09

## 2025-06-13 RX ADMIN — OXYCODONE HYDROCHLORIDE AND ACETAMINOPHEN 1000 MG: 500 TABLET ORAL at 18:56

## 2025-06-13 RX ADMIN — SODIUM CHLORIDE, POTASSIUM CHLORIDE, SODIUM LACTATE AND CALCIUM CHLORIDE: 600; 310; 30; 20 INJECTION, SOLUTION INTRAVENOUS at 10:26

## 2025-06-13 RX ADMIN — ROCURONIUM BROMIDE 20 MG: 10 INJECTION, SOLUTION INTRAVENOUS at 11:05

## 2025-06-13 RX ADMIN — PROPOFOL 30 MG: 10 INJECTION, EMULSION INTRAVENOUS at 11:11

## 2025-06-13 RX ADMIN — EPHEDRINE SULFATE 10 MG: 50 INJECTION INTRAVENOUS at 10:43

## 2025-06-13 RX ADMIN — SENNOSIDES AND DOCUSATE SODIUM 2 TABLET: 50; 8.6 TABLET ORAL at 21:05

## 2025-06-13 RX ADMIN — SUGAMMADEX 200 MG: 100 INJECTION, SOLUTION INTRAVENOUS at 14:47

## 2025-06-13 RX ADMIN — LIDOCAINE HYDROCHLORIDE 80 MG: 20 INJECTION, SOLUTION INFILTRATION; PERINEURAL at 10:33

## 2025-06-13 RX ADMIN — ONDANSETRON 4 MG: 2 INJECTION, SOLUTION INTRAMUSCULAR; INTRAVENOUS at 14:20

## 2025-06-13 RX ADMIN — Medication 80 MCG: at 10:43

## 2025-06-13 RX ADMIN — OXCARBAZEPINE 600 MG: 600 TABLET, FILM COATED ORAL at 21:05

## 2025-06-13 RX ADMIN — EPHEDRINE SULFATE 10 MG: 50 INJECTION INTRAVENOUS at 13:30

## 2025-06-13 RX ADMIN — HYDROMORPHONE HYDROCHLORIDE 0.2 MG: 1 INJECTION, SOLUTION INTRAMUSCULAR; INTRAVENOUS; SUBCUTANEOUS at 14:05

## 2025-06-13 RX ADMIN — ATORVASTATIN CALCIUM 40 MG: 40 TABLET, FILM COATED ORAL at 18:55

## 2025-06-13 RX ADMIN — ESCITALOPRAM OXALATE 20 MG: 10 TABLET ORAL at 18:55

## 2025-06-13 RX ADMIN — OXYBUTYNIN CHLORIDE 5 MG: 5 TABLET ORAL at 21:05

## 2025-06-13 RX ADMIN — FENTANYL CITRATE 100 MCG: 50 INJECTION, SOLUTION INTRAMUSCULAR; INTRAVENOUS at 10:33

## 2025-06-13 RX ADMIN — Medication 1 TABLET: at 18:55

## 2025-06-13 RX ADMIN — ACETAMINOPHEN 1000 MG: 10 INJECTION, SOLUTION INTRAVENOUS at 14:38

## 2025-06-13 RX ADMIN — HYDROMORPHONE HYDROCHLORIDE 0.2 MG: 1 INJECTION, SOLUTION INTRAMUSCULAR; INTRAVENOUS; SUBCUTANEOUS at 14:31

## 2025-06-13 RX ADMIN — MONTELUKAST 10 MG: 10 TABLET, FILM COATED ORAL at 18:56

## 2025-06-13 RX ADMIN — Medication 2 L/MIN: at 15:14

## 2025-06-13 RX ADMIN — POVIDONE-IODINE 1 APPLICATION: 5 SOLUTION TOPICAL at 10:20

## 2025-06-13 RX ADMIN — DEXAMETHASONE SODIUM PHOSPHATE 30 MG: 4 INJECTION INTRA-ARTICULAR; INTRALESIONAL; INTRAMUSCULAR; INTRAVENOUS; SOFT TISSUE at 10:33

## 2025-06-13 RX ADMIN — GABAPENTIN 200 MG: 100 CAPSULE ORAL at 21:05

## 2025-06-13 RX ADMIN — Medication 125 MCG: at 18:56

## 2025-06-13 RX ADMIN — PROPOFOL 50 MCG/KG/MIN: 10 INJECTION, EMULSION INTRAVENOUS at 11:17

## 2025-06-13 RX ADMIN — ROCURONIUM BROMIDE 60 MG: 10 INJECTION, SOLUTION INTRAVENOUS at 10:33

## 2025-06-13 SDOH — ECONOMIC STABILITY: HOUSING INSECURITY: DO YOU FEEL UNSAFE GOING BACK TO THE PLACE WHERE YOU LIVE?: NO

## 2025-06-13 SDOH — HEALTH STABILITY: PHYSICAL HEALTH: ON AVERAGE, HOW MANY MINUTES DO YOU ENGAGE IN EXERCISE AT THIS LEVEL?: 40 MIN

## 2025-06-13 SDOH — SOCIAL STABILITY: SOCIAL INSECURITY
WITHIN THE LAST YEAR, HAVE YOU BEEN RAPED OR FORCED TO HAVE ANY KIND OF SEXUAL ACTIVITY BY YOUR PARTNER OR EX-PARTNER?: NO

## 2025-06-13 SDOH — SOCIAL STABILITY: SOCIAL INSECURITY: WITHIN THE LAST YEAR, HAVE YOU BEEN HUMILIATED OR EMOTIONALLY ABUSED IN OTHER WAYS BY YOUR PARTNER OR EX-PARTNER?: NO

## 2025-06-13 SDOH — ECONOMIC STABILITY: FOOD INSECURITY: WITHIN THE PAST 12 MONTHS, YOU WORRIED THAT YOUR FOOD WOULD RUN OUT BEFORE YOU GOT THE MONEY TO BUY MORE.: NEVER TRUE

## 2025-06-13 SDOH — SOCIAL STABILITY: SOCIAL INSECURITY: DO YOU FEEL ANYONE HAS EXPLOITED OR TAKEN ADVANTAGE OF YOU FINANCIALLY OR OF YOUR PERSONAL PROPERTY?: NO

## 2025-06-13 SDOH — ECONOMIC STABILITY: FOOD INSECURITY: WITHIN THE PAST 12 MONTHS, THE FOOD YOU BOUGHT JUST DIDN'T LAST AND YOU DIDN'T HAVE MONEY TO GET MORE.: NEVER TRUE

## 2025-06-13 SDOH — ECONOMIC STABILITY: FOOD INSECURITY: HOW HARD IS IT FOR YOU TO PAY FOR THE VERY BASICS LIKE FOOD, HOUSING, MEDICAL CARE, AND HEATING?: NOT HARD AT ALL

## 2025-06-13 SDOH — ECONOMIC STABILITY: HOUSING INSECURITY: IN THE LAST 12 MONTHS, WAS THERE A TIME WHEN YOU WERE NOT ABLE TO PAY THE MORTGAGE OR RENT ON TIME?: NO

## 2025-06-13 SDOH — SOCIAL STABILITY: SOCIAL INSECURITY
WITHIN THE LAST YEAR, HAVE YOU BEEN KICKED, HIT, SLAPPED, OR OTHERWISE PHYSICALLY HURT BY YOUR PARTNER OR EX-PARTNER?: NO

## 2025-06-13 SDOH — ECONOMIC STABILITY: INCOME INSECURITY: IN THE PAST 12 MONTHS HAS THE ELECTRIC, GAS, OIL, OR WATER COMPANY THREATENED TO SHUT OFF SERVICES IN YOUR HOME?: NO

## 2025-06-13 SDOH — HEALTH STABILITY: PHYSICAL HEALTH: ON AVERAGE, HOW MANY DAYS PER WEEK DO YOU ENGAGE IN MODERATE TO STRENUOUS EXERCISE (LIKE A BRISK WALK)?: 3 DAYS

## 2025-06-13 SDOH — SOCIAL STABILITY: SOCIAL INSECURITY: HAS ANYONE EVER THREATENED TO HURT YOUR FAMILY OR YOUR PETS?: NO

## 2025-06-13 SDOH — SOCIAL STABILITY: SOCIAL INSECURITY: HAVE YOU HAD ANY THOUGHTS OF HARMING ANYONE ELSE?: NO

## 2025-06-13 SDOH — SOCIAL STABILITY: SOCIAL INSECURITY: WITHIN THE LAST YEAR, HAVE YOU BEEN AFRAID OF YOUR PARTNER OR EX-PARTNER?: NO

## 2025-06-13 SDOH — SOCIAL STABILITY: SOCIAL INSECURITY: DO YOU FEEL UNSAFE GOING BACK TO THE PLACE WHERE YOU ARE LIVING?: NO

## 2025-06-13 SDOH — ECONOMIC STABILITY: HOUSING INSECURITY: AT ANY TIME IN THE PAST 12 MONTHS, WERE YOU HOMELESS OR LIVING IN A SHELTER (INCLUDING NOW)?: NO

## 2025-06-13 SDOH — SOCIAL STABILITY: SOCIAL INSECURITY: HAVE YOU HAD THOUGHTS OF HARMING ANYONE ELSE?: NO

## 2025-06-13 SDOH — ECONOMIC STABILITY: TRANSPORTATION INSECURITY: IN THE PAST 12 MONTHS, HAS LACK OF TRANSPORTATION KEPT YOU FROM MEDICAL APPOINTMENTS OR FROM GETTING MEDICATIONS?: NO

## 2025-06-13 SDOH — ECONOMIC STABILITY: HOUSING INSECURITY: IN THE PAST 12 MONTHS, HOW MANY TIMES HAVE YOU MOVED WHERE YOU WERE LIVING?: 0

## 2025-06-13 SDOH — SOCIAL STABILITY: SOCIAL INSECURITY: ARE YOU OR HAVE YOU BEEN THREATENED OR ABUSED PHYSICALLY, EMOTIONALLY, OR SEXUALLY BY ANYONE?: NO

## 2025-06-13 SDOH — SOCIAL STABILITY: SOCIAL INSECURITY: ABUSE: ADULT

## 2025-06-13 SDOH — SOCIAL STABILITY: SOCIAL INSECURITY: WERE YOU ABLE TO COMPLETE ALL THE BEHAVIORAL HEALTH SCREENINGS?: YES

## 2025-06-13 SDOH — SOCIAL STABILITY: SOCIAL INSECURITY: ARE THERE ANY APPARENT SIGNS OF INJURIES/BEHAVIORS THAT COULD BE RELATED TO ABUSE/NEGLECT?: NO

## 2025-06-13 SDOH — SOCIAL STABILITY: SOCIAL INSECURITY: DOES ANYONE TRY TO KEEP YOU FROM HAVING/CONTACTING OTHER FRIENDS OR DOING THINGS OUTSIDE YOUR HOME?: NO

## 2025-06-13 ASSESSMENT — PAIN - FUNCTIONAL ASSESSMENT
PAIN_FUNCTIONAL_ASSESSMENT: 0-10

## 2025-06-13 ASSESSMENT — COGNITIVE AND FUNCTIONAL STATUS - GENERAL
WALKING IN HOSPITAL ROOM: A LITTLE
TOILETING: A LITTLE
DAILY ACTIVITIY SCORE: 23
CLIMB 3 TO 5 STEPS WITH RAILING: A LITTLE
MOBILITY SCORE: 21
STANDING UP FROM CHAIR USING ARMS: A LITTLE
WALKING IN HOSPITAL ROOM: A LITTLE
PATIENT BASELINE BEDBOUND: NO
STANDING UP FROM CHAIR USING ARMS: A LITTLE
CLIMB 3 TO 5 STEPS WITH RAILING: A LITTLE
MOBILITY SCORE: 21

## 2025-06-13 ASSESSMENT — LIFESTYLE VARIABLES
AUDIT-C TOTAL SCORE: 1
HOW OFTEN DO YOU HAVE A DRINK CONTAINING ALCOHOL: MONTHLY OR LESS
SUBSTANCE_ABUSE_PAST_12_MONTHS: NO
PRESCIPTION_ABUSE_PAST_12_MONTHS: NO
HOW MANY STANDARD DRINKS CONTAINING ALCOHOL DO YOU HAVE ON A TYPICAL DAY: 1 OR 2
HOW OFTEN DO YOU HAVE 6 OR MORE DRINKS ON ONE OCCASION: NEVER
AUDIT-C TOTAL SCORE: 1
SKIP TO QUESTIONS 9-10: 1

## 2025-06-13 ASSESSMENT — PAIN SCALES - GENERAL
PAINLEVEL_OUTOF10: 0 - NO PAIN
PAIN_LEVEL: 1
PAINLEVEL_OUTOF10: 3
PAINLEVEL_OUTOF10: 0 - NO PAIN
PAINLEVEL_OUTOF10: 0 - NO PAIN
PAINLEVEL_OUTOF10: 5 - MODERATE PAIN
PAINLEVEL_OUTOF10: 0 - NO PAIN
PAINLEVEL_OUTOF10: 5 - MODERATE PAIN
PAINLEVEL_OUTOF10: 3
PAINLEVEL_OUTOF10: 5 - MODERATE PAIN

## 2025-06-13 ASSESSMENT — ACTIVITIES OF DAILY LIVING (ADL)
GROOMING: INDEPENDENT
TOILETING: INDEPENDENT
DRESSING YOURSELF: INDEPENDENT
FEEDING YOURSELF: INDEPENDENT
PATIENT'S MEMORY ADEQUATE TO SAFELY COMPLETE DAILY ACTIVITIES?: YES
ASSISTIVE_DEVICE: HEARING AID - RIGHT;HEARING AID - LEFT
JUDGMENT_ADEQUATE_SAFELY_COMPLETE_DAILY_ACTIVITIES: YES
BATHING: INDEPENDENT
HEARING - LEFT EAR: FUNCTIONAL
HEARING - RIGHT EAR: FUNCTIONAL
ADEQUATE_TO_COMPLETE_ADL: YES
LACK_OF_TRANSPORTATION: NO
WALKS IN HOME: INDEPENDENT

## 2025-06-13 ASSESSMENT — PATIENT HEALTH QUESTIONNAIRE - PHQ9
1. LITTLE INTEREST OR PLEASURE IN DOING THINGS: NOT AT ALL
SUM OF ALL RESPONSES TO PHQ9 QUESTIONS 1 & 2: 0
2. FEELING DOWN, DEPRESSED OR HOPELESS: NOT AT ALL

## 2025-06-13 NOTE — ANESTHESIA PROCEDURE NOTES
Peripheral IV  Date/Time: 6/13/2025 10:45 AM      Placement  Needle size: 16 G  Laterality: left  Location: hand  Local anesthetic: none  Site prep: alcohol  Technique: anatomical landmarks  Attempts: 1

## 2025-06-13 NOTE — OP NOTE
"Microvascular Decompression (R) Operative Note     Date: 2025  OR Location: [facility] OR    Name: Adebayo Puentes \"Ed\", : 1950, Age: 74 y.o., MRN: [id number], Sex: male    Diagnosis  Pre-op Diagnosis      * Trigeminal neuralgia [G50.0] Post-op Diagnosis     * Trigeminal neuralgia [G50.0]     Procedures  Right retromastoid craniectomy for microvascular decompression of trigeminal nerve  Use of operative microscope    Surgeons      * Rohit Riddle - Primary    Resident/Fellow/Other Assistant:  Surgeons and Role:     * Zachary Taylor MD - Resident - Assisting    Staff:   Circulator: Jennifer  Scrub Person: Nadja Delucaub Person: Juma Hale Circulator: Tearris  Relief Scrub: Jennifer    Anesthesia Staff: Anesthesiologist: Rohit Tom MD PhD  Anesthesia Resident: Becka Zuniga MD; Ghulam Whittaker DO    Procedure Summary  Anesthesia: General  ASA: III  Estimated Blood Loss: 100mL  Intra-op Medications:   Administrations occurring from 0940 to 1615 on 25:   Medication Name Total Dose   polymyxin B injection 1,000,000 Units   lidocaine-epinephrine (Xylocaine W/EPI) 0.5 %-1:200,000 injection 20 mL   thrombin-bovine (JMI) 5,000 unit topical solution 10,000 Units   bacitracin ointment 2 Application   sodium chloride 0.9 % irrigation solution 2,000 mL   papaverine injection 60 mg   acetaminophen (Ofirmev) injection 1,000 mg   ceFAZolin (Ancef) vial 1 g 2 g   dexAMETHasone (Decadron) 4 mg/mL 30 mg   ePHEDrine injection 20 mg   fentaNYL (Sublimaze) injection 50 mcg/mL 100 mcg   HYDROmorphone (Dilaudid) injection 1 mg/mL 0.4 mg   LR infusion Cannot be calculated   lidocaine (Xylocaine) injection 2 % 80 mg   ondansetron 2 mg/mL 4 mg   phenylephrine 40 mcg/mL syringe 10 mL 80 mcg   propofol (Diprivan) injection 10 mg/mL 1,803.2 mg   rocuronium (ZeMuron) 50 mg/5 mL injection 140 mg   sugammadex (Bridion) 200 mg/2 mL injection 200 mg   povidone-iodine 5 % kit kit 1 Application              Anesthesia Record           " "    Intraprocedure I/O Totals          Output    Urine 415 mL    Total Output 415 mL          Specimen: No specimens collected              Drains and/or Catheters:   Urethral Catheter Non-latex 16 Fr. (Active)       Tourniquet Times:         Implants:  Implants       Type Name Action Serial No.      Neuro Interventional Implant ODALIS HOLE COVER, 24MM Implanted       4MM SCREW Implanted       4MM SCREW Used, Not Implanted      Neuro Interventional Implant SYNTHECEL DURA REPAIR, 2.5CM X 2.5CM Implanted      Graft GRAFT MATRIX, DURAL, DURAGEN PLUS 1X1 - [id number] Implanted               Findings: significant compression of trigemninal nerve by basilar artery and AICA    Indications: Adebayo Puentes \"Ed\" is an 74 y.o. male who is having surgery for Trigeminal neuralgia [G50.0].     The patient was seen in the preoperative area. The risks, benefits, complications, treatment options, non-operative alternatives, expected recovery and outcomes were discussed with the patient. The possibilities of reaction to medication, pulmonary aspiration, injury to surrounding structures, bleeding, recurrent infection, the need for additional procedures, failure to diagnose a condition, and creating a complication requiring transfusion or operation were discussed with the patient. The patient concurred with the proposed plan, giving informed consent.  The site of surgery was properly noted/marked if necessary per policy. The patient has been actively warmed in preoperative area. Preoperative antibiotics have been ordered and given within 1 hours of incision. Venous thrombosis prophylaxis have been ordered including bilateral sequential compression devices    Procedure Details:     The patient was brought back from preop to OR. A safety huddle was performed and anesthesia was induced. The patient was then positioned in a left lateral position on a bean bag with the right post-auriclar area at the top of the surgical field. A curvilinear " right posterior auricular area centered at the transverse-sigmoid junction was cleansed, prepped and draped in usual sterile fashion. Local anesthetic was used to infiltrate skin over incision.     The curvilinear incision was incised with #10 blade. Bone was exposed with bovey electrocautery and periosteal elevator. The asterion was exposed and identified, and a 3-cm diameter craniectomy was made using acorn drill. The craniectomy was further expanded with Kerrison rongeur and the tranverse-sigmoid sinus junction was identified. Dura was opened in a C-shaped fashion along the sinus, and CSF was let off using gentle retraction with cottonoid sveta.     Then, cottonoid was advanced parallel to the  petrous apex with gentle retraction. The superior petrosal vein was encountered and ligated with bipolar electrocautery and cut sharply. We then encountered the trigeminal nerve, upon which significant vascular compression by the basilar artery and anterior inferior cerebellar atery. Arachnoid mater was dissected and  from the trigeminal nerve, and the area of vascular compression was freed and  from the nerve.    After the trigeminal root entry zone was adequately freed and nerve was thoroughly inspected to confirm no further compression, multiple pieces of pardeep were placed under the nerve as well as to transpose the basilar artery so as to prevent recurrent or residual compression. The wound was then was copiously irrigated with antibiotic impregnated saline and meticulous hemostasis was attained. The dural defect was approximated with 4-0 neurolon sutures, with synthicel and duragen used for onlay. Adherus was placed both under and over the dural substitute. Compressed gel foam was placed over the defect.The bony defect was covered with large burrhole cover. Muscle over the craniectomy was approximated and closed with 0 vicryl sutures, followed by 2-0 vicryl sutures. A 3-0 nylon suture was used in  running fashion for skin.    Patient tolerated procedure well with no complications; they were then turned over to anesthesia for extubation. All counts were correct at the end of the case.      Evidence of Infection: No   Complications:  None; patient tolerated the procedure well.    Disposition: PACU - hemodynamically stable.  Condition: stable       Additional Details: none    Attending Attestation: I was present and scrubbed for the key portions of the procedure.    Rohit Riddle  Phone Number: [phone]

## 2025-06-13 NOTE — ANESTHESIA PROCEDURE NOTES
Arterial Line:    Date/Time: 6/13/2025 10:52 AM    Staffing  Performed: resident   Authorized by: Rohit Tom MD PhD    Performed by: Ghulam Whittaker DO    An arterial line was placed. Procedure performed using surface landmarks.in the OR for the following indication(s): continuous blood pressure monitoring and blood sampling needed.    A 20 gauge (size), 1 and 3/8 inch (length), Angiocath (type) catheter was placed into the Left radial artery, secured by Tegaderm,   Seldinger technique not used.  Events:  patient tolerated procedure well with no complications.

## 2025-06-13 NOTE — ANESTHESIA PREPROCEDURE EVALUATION
"Patient: Adebayo Puentes \"Ed\"    Procedure Information       Date/Time: 06/13/25 0940    Procedure: Microvascular Decompression with Lumbar Drain Placement (Right)    Location: [facility] OR 23 / [facility] OR    Surgeons: Rohit Riddle MD            Relevant Problems   Cardiac   (+) Atherosclerotic heart disease of native coronary artery without angina pectoris   (+) CAD (coronary artery disease)   (+) Essential (primary) hypertension   (+) Hyperlipidemia   (+) Hypertension   (+) MI (myocardial infarction) (Multi)   (+) Mixed hyperlipidemia      Neuro   (+) Depression   (+) Idiopathic peripheral neuropathy   (+) Major depressive disorder, single episode, unspecified   (+) Recurrent major depressive disorder   (+) Right trigeminal neuralgia   (+) Trigeminal neuralgia      GI   (+) Dysphagia   (+) GERD (gastroesophageal reflux disease)      /Renal   (+) BPH (benign prostatic hyperplasia)   (+) BPH with obstruction/lower urinary tract symptoms   (+) Nephrolithiasis   (+) Prostate cancer (Multi)      HEENT   (+) Bilateral sensorineural hearing loss   (+) Seasonal allergies       Clinical information reviewed:   Tobacco  Allergies  Meds   Med Hx  Surg Hx   Fam Hx  Soc Hx        NPO Detail:  NPO/Void Status  Carbohydrate Drink Given Prior to Surgery? : N  Date of Last Liquid: 06/12/25  Time of Last Liquid: 2200  Date of Last Solid: 06/12/25  Time of Last Solid: 2200  Last Intake Type: Clear fluids  Time of Last Void: 0842         Physical Exam    Airway  Mallampati: II  TM distance: >3 FB  Neck ROM: full  Mouth opening: 3 or more finger widths     Cardiovascular    Dental - normal exam     Pulmonary    Abdominal            Anesthesia Plan    History of general anesthesia?: yes  History of complications of general anesthesia?: no    ASA 3     general     Anesthetic plan and risks discussed with patient.  Use of blood products discussed with patient who consented to blood products.    Plan discussed with " attending and resident.    Attending Anesthesiologist Note  Above information reviewed including relevant HPI, PMHx, PSHx, anesthesia history, labs, and imaging.    Summary (from patient interview and chart review):   74 y.o. male with hx including trigeminal neuralgia, prostate cancer, GERD s/p Nissen fundoplication in 1986 s/p esophagoscopy and dilation to 32mm on 10/17/24, dysphonia and vocal cord atrophy (follows with ENT), HTN, CAD, and CKD. He was diagnosed with trigeminal neuralgia on R side (predominantly V3) in 2021, s/p gamma knife radiosurgery, with near complete relief of pain.       He recently followed up with Dr. José for recurrence of pain on R side, affecting V1, V2, and V3. States pain is unrelieved by carbamazepine and gabapentin.  Right eyelid to top of ear, then down jaw to the top of right lip, then to bottom of jaw into teeth. He has since stopped carbamazepine and started Oxcarbazapine on 4/14. He feels like the medication is helping some symptoms and not so much the others. He has been up all night in pain. Pain is gums and lips are numb and tingling, tongue is sore, has shooting, stabbing, burning. Has time when he doesn't have pain at all--once a week. The morning are worse. Pain is triggered by touch of face, shaving, talking, eating and chewing. Cold temps can some times trigger pain.      OR today for Microvascular Decompression with Lumbar Drain Placement   Relevant Problems   Cardiac   (+) Atherosclerotic heart disease of native coronary artery without angina pectoris   (+) CAD (coronary artery disease)   (+) Essential (primary) hypertension   (+) Hyperlipidemia   (+) Hypertension   (+) MI (myocardial infarction) (Multi)   (+) Mixed hyperlipidemia      Neuro   (+) Depression   (+) Idiopathic peripheral neuropathy   (+) Major depressive disorder, single episode, unspecified   (+) Recurrent major depressive disorder   (+) Right trigeminal neuralgia   (+) Trigeminal neuralgia      GI    (+) Dysphagia   (+) GERD (gastroesophageal reflux disease)      /Renal   (+) BPH (benign prostatic hyperplasia)   (+) BPH with obstruction/lower urinary tract symptoms   (+) Nephrolithiasis   (+) Prostate cancer (Multi)      HEENT   (+) Bilateral sensorineural hearing loss   (+) Seasonal allergies        Medication Documentation Review Audit       Reviewed by Anne Marie Moreau RN (Registered Nurse) on 06/13/25 at 0839      Medication Order Taking? Sig Documenting Provider Last Dose Status   amLODIPine (Norvasc) 10 mg tablet [id number] Yes Take 1 tablet (10 mg) by mouth once daily. Mayda Carter MD 6/12/2025 Active   ascorbic acid, vitamin C, 1,000 mg ER tablet [id number]  Take 1 tablet (1,000 mg) by mouth once daily. Historical Provider, MD  Active   aspirin 81 mg EC tablet [id number] Yes Take 1 tablet (81 mg) by mouth once daily. Historical Provider, MD 6/13/2025 Morning Active   atenolol (Tenormin) 25 mg tablet [id number] Yes Take 1 tablet (25 mg) by mouth once daily. Mayda Carter MD 6/12/2025 Active   atorvastatin (Lipitor) 40 mg tablet [id number] Yes Take 1 tablet (40 mg) by mouth once daily. Mayda Carter MD 6/12/2025 Active   calcium carbonate-vitamin D3 (Caltrate with Vitamin D3) 600 mg-20 mcg (800 unit) tablet [id number]  Take 1 tablet by mouth once daily. Historical Provider, MD  Active   chlorhexidine (Hibiclens) 4 % external liquid [id number] Yes Use as directed daily preoperatively for 5 days leading up to surgery, wash body all over not on face or genital region, let sit on skin for 3 minutes before rising. SEBASTIAN Hanna-CNP 6/13/2025 Morning Active   chlorhexidine (Peridex) 0.12 % solution [id number] Yes Swish and spit with 15ml of solution the night before and morning of surgery. Do not swallow. SEBASTIAN Hanna-CNP 6/13/2025 Morning Active   cholecalciferol (Vitamin D3) 5,000 Units tablet [id number]  Take 1 tablet (125 mcg) by mouth once daily. Historical Provider, MD  Active  "  escitalopram (Lexapro) 20 mg tablet [id number] Yes Take 1 tablet (20 mg) by mouth once daily. Mayda Carter MD 6/12/2025 Active   esomeprazole (NexIUM) 20 mg DR capsule [id number] Yes Take 1 capsule (20 mg) by mouth 2 times a day. Do not open capsule. Historical Provider, MD 6/13/2025 Morning Active   gabapentin (Neurontin) 100 mg capsule [id number] Yes Take 2 capsules (200 mg) by mouth 2 times a day. Annie José MD 6/13/2025 Morning Active   losartan-hydrochlorothiazide (Hyzaar) 100-25 mg tablet [id number] Yes Take 1 tablet by mouth once daily. Mayda Carter MD 6/12/2025 Active   montelukast (Singulair) 10 mg tablet [id number]  Take 1 tablet (10 mg) by mouth once daily. Mayda Carter MD  Active   multivitamin with minerals iron-free (Centrum Silver) [id number]  Take 1 tablet by mouth once daily. Historical Provider, MD  Active   Myrbetriq 50 mg tablet extended release 24 hr 24 hr tablet [id number] Yes Take 1 tablet (50 mg) by mouth once daily. Yadira Fuentes MD 6/12/2025 Active   OXcarbazepine (Trileptal) 300 mg tablet [id number] Yes 2 tablets 2 times daily Annie José MD 6/13/2025 Morning Active   traZODone (Desyrel) 100 mg tablet [id number] Yes Take 1 tablet (100 mg) by mouth once daily at bedtime. Mayda Carter MD 6/12/2025 Active                    Visit Vitals  /69   Pulse (!) 49   Temp 36.9 °C (98.4 °F) (Temporal)   Resp 16   Ht 1.905 m (6' 3\")   Wt 99.5 kg (219 lb 5.7 oz)   SpO2 96%   BMI 27.42 kg/m²   Smoking Status Never   BSA 2.29 m²            4/22/2025    10:47 AM 4/23/2025     2:46 PM 5/6/2025     9:06 AM 5/9/2025     9:00 AM 6/3/2025     9:38 AM 6/6/2025     7:37 AM 6/13/2025     8:40 AM   Vitals   Systolic  127  103 107 128 129   Diastolic  75  57 68 78 69   BP Location    Left arm      Heart Rate  50  59 50 81 49   Temp  36.3 °C (97.3 °F) 36.1 °C (97 °F) 36.9 °C (98.5 °F) 36.5 °C (97.7 °F) 36.4 °C (97.6 °F) 36.9 °C (98.4 °F)   Resp       16   Height  1.823 m (5' 11.77\") 1.854 m (6' 1\")  " "1.88 m (6' 2\") 1.905 m (6' 3\") 1.905 m (6' 3\")   Weight (lb) 228 230   223 222 219.36   BMI 28.5 kg/m2 31.39 kg/m2 30.34 kg/m2  28.63 kg/m2 27.75 kg/m2 27.42 kg/m2   BSA (m2) 2.33 m2 2.29 m2 2.31 m2  2.3 m2 2.31 m2 2.29 m2   Visit Report  Report Report Report  Report         Recent Labs:    Chemistry    Lab Results   Component Value Date/Time     05/01/2025 0718    K 4.6 05/01/2025 0718     05/01/2025 0718    CO2 30 05/01/2025 0718    BUN 32 (H) 05/01/2025 0718    CREATININE 1.48 (H) 05/01/2025 0718    Lab Results   Component Value Date/Time    CALCIUM 9.6 05/01/2025 0718    ALKPHOS 117 09/08/2023 1024    AST 34 09/08/2023 1024    ALT 39 03/06/2024 1049    BILITOT 0.6 09/08/2023 1024          Lab Results   Component Value Date/Time    WBC 5.6 06/03/2025 1019    WBC 4.9 05/01/2025 0718    HGB 13.4 (L) 06/03/2025 1019    HGB 13.1 (L) 05/01/2025 0718    HCT 41.2 06/03/2025 1019    HCT 38.8 05/01/2025 0718     06/03/2025 1019     05/01/2025 0718     Lab Results   Component Value Date/Time    PROTIME 11.9 06/03/2025 1019    INR 1.1 06/03/2025 1019       Electrocardiogram:  Encounter Date: 03/06/25   ECG 12 lead (Clinic Performed)   Result Value    Ventricular Rate 60    Atrial Rate 60    KS Interval 192    QRS Duration 98    QT Interval 402    QTC Calculation(Bazett) 402    P Axis 70    R Axis 19    T Axis 45    QRS Count 10    Q Onset 212    P Onset 116    P Offset 139    T Offset 413    QTC Fredericia 402    Narrative    Normal sinus rhythm  Normal ECG  When compared with ECG of 05-SEP-2024 14:54,  No significant change was found  Confirmed by Miguel Howell ([id number]) on 3/11/2025 8:51:49 AM       Echocardiogram:  Results for orders placed during the hospital encounter of 03/06/25    Transthoracic echo (TTE) complete    [facility] [place], [place] [address]  Tel [phone] and Fax " [phone]    TRANSTHORACIC ECHOCARDIOGRAM REPORT      Patient Name:       DAT GRANGER       Reading Physician:    [id number] Miguel Howell MD  Study Date:         3/6/2025                CONCLUSIONS:  1. The left ventricular systolic function is normal, with a Quinteros's biplane calculated ejection fraction of 68%.  2. Spectral Doppler shows a Grade I (impaired relaxation pattern) of left ventricular diastolic filling with normal left atrial filling pressure.  3. There is mildly reduced right ventricular systolic function.  4. The left atrium is mildly dilated.  5. Mild aortic valve stenosis.    Anesthesia History: No anesthesia complications in patient or family   Anesthesia Plan: GA/ETT, standard monitors , arterial line   Small ETT with careful placement thru cords    I discussed the anesthesia plan with the patient and/or family and reviewed the risks, benefits and alternatives. Agree to proceed.  Rohit Tom MD PhD

## 2025-06-13 NOTE — ANESTHESIA POSTPROCEDURE EVALUATION
"Patient: Adebayo Puentes \"Ed\"    Procedure Summary       Date: 06/13/25 Room / Location: [facility] OR 23 / [facility]    Anesthesia Start: 1027 Anesthesia Stop: 1521    Procedure: Microvascular Decompression (Right: Head) Diagnosis:       Trigeminal neuralgia      (Trigeminal neuralgia [G50.0])    Surgeons: Rohit Riddle MD Responsible Provider: Rohit Tom MD PhD    Anesthesia Type: general ASA Status: 3            Anesthesia Type: general    Vitals Value Taken Time   /73 06/13/25 15:17   Temp 36.3 06/13/25 15:21   Pulse 64 06/13/25 15:18   Resp 13 06/13/25 15:18   SpO2 99 % 06/13/25 15:18   Vitals shown include unfiled device data.    Anesthesia Post Evaluation    Patient location during evaluation: PACU  Patient participation: complete - patient participated  Level of consciousness: sleepy but conscious  Pain score: 1  Pain management: adequate  Multimodal analgesia pain management approach  Airway patency: positional obstruction (placed nasal trumpet)  Cardiovascular status: blood pressure returned to baseline and hemodynamically stable  Respiratory status: face mask, acceptable and nonlabored ventilation  Hydration status: acceptable  Postoperative Nausea and Vomiting: none        No notable events documented.    "

## 2025-06-13 NOTE — HOSPITAL COURSE
Adebayo Puentes is a 74 y.o. 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         PT/OT evaluated patient and recommended low level of contined therapy for which patient was provided with outpatient prescriptions.     On the day of discharge, the patient was seen and evaluated by the neurosurgery team and deemed suitable for discharge. The patient was given detailed discharge instructions and were scheduled to follow up as an outpatient.

## 2025-06-13 NOTE — ANESTHESIA PROCEDURE NOTES
Airway  Date/Time: 6/13/2025 10:35 AM  Reason: elective    Airway not difficult    Staffing  Performed: resident   Authorized by: Rohit Tom MD PhD    Performed by: Ghulam Whittaker DO  Patient location during procedure: OR    Patient Condition  Indications for airway management: anesthesia and airway protection  Patient position: sniffing  MILS maintained throughout  Planned trial extubation  Sedation level: deep     Final Airway Details   Preoxygenated: yes  Final airway type: endotracheal airway  Successful airway: ETT  Cuffed: yes   Successful intubation technique: video laryngoscopy  Adjuncts used in placement: intubating stylet  Endotracheal tube insertion site: oral  Blade: Gilbert  Blade size: #3  ETT size (mm): 6.5  Cormack-Lehane Classification: grade I - full view of glottis  Placement verified by: capnometry   Measured from: teeth  ETT to teeth (cm): 21  Number of attempts at approach: 1    Additional Comments  ETT passed through vocal cords carefully and smoothly without trauma

## 2025-06-14 LAB
ALBUMIN SERPL BCP-MCNC: 4.5 G/DL (ref 3.4–5)
ANION GAP SERPL CALC-SCNC: 12 MMOL/L (ref 10–20)
BUN SERPL-MCNC: 31 MG/DL (ref 6–23)
CALCIUM SERPL-MCNC: 10.2 MG/DL (ref 8.6–10.6)
CHLORIDE SERPL-SCNC: 100 MMOL/L (ref 98–107)
CO2 SERPL-SCNC: 32 MMOL/L (ref 21–32)
CREAT SERPL-MCNC: 1.27 MG/DL (ref 0.5–1.3)
EGFRCR SERPLBLD CKD-EPI 2021: 59 ML/MIN/1.73M*2
ERYTHROCYTE [DISTWIDTH] IN BLOOD BY AUTOMATED COUNT: 13.1 % (ref 11.5–14.5)
GLUCOSE BLD MANUAL STRIP-MCNC: 172 MG/DL (ref 74–99)
GLUCOSE BLD MANUAL STRIP-MCNC: 87 MG/DL (ref 74–99)
GLUCOSE BLD MANUAL STRIP-MCNC: 90 MG/DL (ref 74–99)
GLUCOSE SERPL-MCNC: 137 MG/DL (ref 74–99)
HCT VFR BLD AUTO: 40.4 % (ref 41–52)
HGB BLD-MCNC: 13.2 G/DL (ref 13.5–17.5)
MAGNESIUM SERPL-MCNC: 2.14 MG/DL (ref 1.6–2.4)
MCH RBC QN AUTO: 32.2 PG (ref 26–34)
MCHC RBC AUTO-ENTMCNC: 32.7 G/DL (ref 32–36)
MCV RBC AUTO: 99 FL (ref 80–100)
NRBC BLD-RTO: 0 /100 WBCS (ref 0–0)
PHOSPHATE SERPL-MCNC: 3.2 MG/DL (ref 2.5–4.9)
PLATELET # BLD AUTO: 193 X10*3/UL (ref 150–450)
POTASSIUM SERPL-SCNC: 4 MMOL/L (ref 3.5–5.3)
RBC # BLD AUTO: 4.1 X10*6/UL (ref 4.5–5.9)
SODIUM SERPL-SCNC: 140 MMOL/L (ref 136–145)
WBC # BLD AUTO: 11.3 X10*3/UL (ref 4.4–11.3)

## 2025-06-14 PROCEDURE — 82947 ASSAY GLUCOSE BLOOD QUANT: CPT

## 2025-06-14 PROCEDURE — 36415 COLL VENOUS BLD VENIPUNCTURE: CPT | Performed by: STUDENT IN AN ORGANIZED HEALTH CARE EDUCATION/TRAINING PROGRAM

## 2025-06-14 PROCEDURE — 2500000002 HC RX 250 W HCPCS SELF ADMINISTERED DRUGS (ALT 637 FOR MEDICARE OP, ALT 636 FOR OP/ED): Performed by: STUDENT IN AN ORGANIZED HEALTH CARE EDUCATION/TRAINING PROGRAM

## 2025-06-14 PROCEDURE — 2500000004 HC RX 250 GENERAL PHARMACY W/ HCPCS (ALT 636 FOR OP/ED): Performed by: STUDENT IN AN ORGANIZED HEALTH CARE EDUCATION/TRAINING PROGRAM

## 2025-06-14 PROCEDURE — 1200000002 HC GENERAL ROOM WITH TELEMETRY DAILY

## 2025-06-14 PROCEDURE — 2500000001 HC RX 250 WO HCPCS SELF ADMINISTERED DRUGS (ALT 637 FOR MEDICARE OP): Performed by: STUDENT IN AN ORGANIZED HEALTH CARE EDUCATION/TRAINING PROGRAM

## 2025-06-14 PROCEDURE — 83735 ASSAY OF MAGNESIUM: CPT | Performed by: STUDENT IN AN ORGANIZED HEALTH CARE EDUCATION/TRAINING PROGRAM

## 2025-06-14 PROCEDURE — 97162 PT EVAL MOD COMPLEX 30 MIN: CPT | Mod: GP

## 2025-06-14 PROCEDURE — 85027 COMPLETE CBC AUTOMATED: CPT | Performed by: STUDENT IN AN ORGANIZED HEALTH CARE EDUCATION/TRAINING PROGRAM

## 2025-06-14 PROCEDURE — 80069 RENAL FUNCTION PANEL: CPT | Performed by: STUDENT IN AN ORGANIZED HEALTH CARE EDUCATION/TRAINING PROGRAM

## 2025-06-14 RX ADMIN — ESCITALOPRAM OXALATE 20 MG: 10 TABLET ORAL at 08:42

## 2025-06-14 RX ADMIN — HYDROCHLOROTHIAZIDE: 25 TABLET ORAL at 08:42

## 2025-06-14 RX ADMIN — HEPARIN SODIUM 5000 UNITS: 5000 INJECTION, SOLUTION INTRAVENOUS; SUBCUTANEOUS at 23:44

## 2025-06-14 RX ADMIN — PANTOPRAZOLE SODIUM 20 MG: 20 TABLET, DELAYED RELEASE ORAL at 16:34

## 2025-06-14 RX ADMIN — PANTOPRAZOLE SODIUM 20 MG: 20 TABLET, DELAYED RELEASE ORAL at 06:53

## 2025-06-14 RX ADMIN — OXCARBAZEPINE 600 MG: 600 TABLET, FILM COATED ORAL at 08:43

## 2025-06-14 RX ADMIN — GABAPENTIN 200 MG: 100 CAPSULE ORAL at 20:33

## 2025-06-14 RX ADMIN — CYCLOBENZAPRINE 10 MG: 10 TABLET, FILM COATED ORAL at 04:51

## 2025-06-14 RX ADMIN — SENNOSIDES AND DOCUSATE SODIUM 2 TABLET: 50; 8.6 TABLET ORAL at 08:42

## 2025-06-14 RX ADMIN — ACETAMINOPHEN 650 MG: 325 TABLET, FILM COATED ORAL at 00:45

## 2025-06-14 RX ADMIN — Medication 1 TABLET: at 08:42

## 2025-06-14 RX ADMIN — OXCARBAZEPINE 600 MG: 600 TABLET, FILM COATED ORAL at 20:34

## 2025-06-14 RX ADMIN — ACETAMINOPHEN 650 MG: 325 TABLET, FILM COATED ORAL at 18:18

## 2025-06-14 RX ADMIN — MONTELUKAST 10 MG: 10 TABLET, FILM COATED ORAL at 08:42

## 2025-06-14 RX ADMIN — Medication 125 MCG: at 08:42

## 2025-06-14 RX ADMIN — POLYETHYLENE GLYCOL 3350 17 G: 17 POWDER, FOR SOLUTION ORAL at 08:43

## 2025-06-14 RX ADMIN — Medication 1 TABLET: at 08:43

## 2025-06-14 RX ADMIN — ACETAMINOPHEN 650 MG: 325 TABLET, FILM COATED ORAL at 23:44

## 2025-06-14 RX ADMIN — SENNOSIDES AND DOCUSATE SODIUM 2 TABLET: 50; 8.6 TABLET ORAL at 20:33

## 2025-06-14 RX ADMIN — ATORVASTATIN CALCIUM 40 MG: 40 TABLET, FILM COATED ORAL at 08:43

## 2025-06-14 RX ADMIN — GABAPENTIN 200 MG: 100 CAPSULE ORAL at 08:43

## 2025-06-14 RX ADMIN — ACETAMINOPHEN 650 MG: 325 TABLET, FILM COATED ORAL at 06:51

## 2025-06-14 RX ADMIN — OXYBUTYNIN CHLORIDE 5 MG: 5 TABLET ORAL at 16:34

## 2025-06-14 RX ADMIN — CYCLOBENZAPRINE 10 MG: 10 TABLET, FILM COATED ORAL at 20:33

## 2025-06-14 RX ADMIN — ACETAMINOPHEN 650 MG: 325 TABLET, FILM COATED ORAL at 13:43

## 2025-06-14 RX ADMIN — AMLODIPINE BESYLATE 10 MG: 10 TABLET ORAL at 08:42

## 2025-06-14 RX ADMIN — OXYCODONE HYDROCHLORIDE AND ACETAMINOPHEN 1000 MG: 500 TABLET ORAL at 08:42

## 2025-06-14 RX ADMIN — OXYBUTYNIN CHLORIDE 5 MG: 5 TABLET ORAL at 08:43

## 2025-06-14 RX ADMIN — OXYBUTYNIN CHLORIDE 5 MG: 5 TABLET ORAL at 20:34

## 2025-06-14 RX ADMIN — TRAZODONE HYDROCHLORIDE 100 MG: 50 TABLET ORAL at 20:34

## 2025-06-14 ASSESSMENT — COGNITIVE AND FUNCTIONAL STATUS - GENERAL
WALKING IN HOSPITAL ROOM: A LITTLE
CLIMB 3 TO 5 STEPS WITH RAILING: A LITTLE
MOBILITY SCORE: 21
MOVING TO AND FROM BED TO CHAIR: A LITTLE
MOBILITY SCORE: 18
STANDING UP FROM CHAIR USING ARMS: A LITTLE
DAILY ACTIVITIY SCORE: 23
MOBILITY SCORE: 22
STANDING UP FROM CHAIR USING ARMS: A LITTLE
MOVING FROM LYING ON BACK TO SITTING ON SIDE OF FLAT BED WITH BEDRAILS: A LITTLE
TOILETING: A LITTLE
DAILY ACTIVITIY SCORE: 23
TURNING FROM BACK TO SIDE WHILE IN FLAT BAD: A LITTLE
CLIMB 3 TO 5 STEPS WITH RAILING: A LITTLE
WALKING IN HOSPITAL ROOM: A LITTLE
TOILETING: A LITTLE
STANDING UP FROM CHAIR USING ARMS: A LITTLE
CLIMB 3 TO 5 STEPS WITH RAILING: A LITTLE

## 2025-06-14 ASSESSMENT — PAIN SCALES - GENERAL
PAINLEVEL_OUTOF10: 5 - MODERATE PAIN
PAINLEVEL_OUTOF10: 7
PAINLEVEL_OUTOF10: 5 - MODERATE PAIN
PAINLEVEL_OUTOF10: 7
PAINLEVEL_OUTOF10: 6

## 2025-06-14 ASSESSMENT — PAIN - FUNCTIONAL ASSESSMENT
PAIN_FUNCTIONAL_ASSESSMENT: 0-10

## 2025-06-14 ASSESSMENT — PAIN DESCRIPTION - DESCRIPTORS
DESCRIPTORS: ACHING
DESCRIPTORS: ACHING
DESCRIPTORS: DISCOMFORT

## 2025-06-14 ASSESSMENT — ACTIVITIES OF DAILY LIVING (ADL)
ADL_ASSISTANCE: INDEPENDENT
LACK_OF_TRANSPORTATION: NO

## 2025-06-14 NOTE — PROGRESS NOTES
"  NEUROSURGERY PROGRESS NOTE    Adebayo Puentes \"Ed\" is a 74 y.o. male on day 1 of admission presenting with Trigeminal neuralgia.    Subjective   Reports improved R V1-3 pain however says its usually triggered when brushing teeth in AM. Overall doing well postop       Objective     Physical Exam  Exam:  Aox3  Cranial nerves grossly intact  BUE 5/5  BLE 5/5  Improved R V1-3 pain    Last Recorded Vitals  Blood pressure 121/70, pulse 60, temperature 37.2 °C (99 °F), temperature source Temporal, resp. rate 15, height 1.905 m (6' 3\"), weight 99.5 kg (219 lb 5.7 oz), SpO2 91%.     Intake/Output last 3 Shifts:  I/O last 3 completed shifts:  In: 526.7 (5.3 mL/kg) [P.O.:350; I.V.:176.7 (1.8 mL/kg)]  Out: 485 (4.9 mL/kg) [Urine:485 (0.1 mL/kg/hr)]  Weight: 99.5 kg     Labs:  Lab Results   Component Value Date    WBC 5.6 06/03/2025    HGB 13.4 (L) 06/03/2025    HCT 41.2 06/03/2025    MCV 96 06/03/2025     06/03/2025      Lab Results   Component Value Date    GLUCOSE 92 05/01/2025    CALCIUM 9.6 05/01/2025     05/01/2025    K 4.6 05/01/2025    CO2 30 05/01/2025     05/01/2025    BUN 32 (H) 05/01/2025    CREATININE 1.48 (H) 05/01/2025      Lab Results   Component Value Date    CREATININE 1.48 (H) 05/01/2025    BUN 32 (H) 05/01/2025     05/01/2025    K 4.6 05/01/2025     05/01/2025    CO2 30 05/01/2025      Lab Results   Component Value Date    CALCIUM 9.6 05/01/2025    PHOS 4.0 05/01/2025      Lab Results   Component Value Date    INR 1.1 06/03/2025    INR 1.0 09/08/2023    INR 1.2 (H) 02/24/2023    PROTIME 11.9 06/03/2025    PROTIME 9.9 09/08/2023    PROTIME 13.7 (H) 02/24/2023        Imaging:  No orders to display             Assessment/Plan   Assessment & Plan  Trigeminal neuralgia    73 yo male with h/o HTN, HLD, GERD s/p Nissen (1980s) and dilation (2024), BPH s/p TERP (2020), CKD, prior MI on asa, TGN s/p SRS p/w refractory pain, 6/13 s/p R MVD    Plan:  LYSSA, will likely downgrade to floor " this AM  ASA restart POD5,   Ok to work with PTOT and OOB   SCDs, ok for DVT ppx POD2    Rivera Lowery MD  Department of Neurosurgery   [facility]

## 2025-06-14 NOTE — CARE PLAN
No neurological changes. C/o pain medicated with PRN however offered Oxy, declined during the shift. Dressing intact however drainage noted and dressing was reinforced per provider. No bowel movement this shift.       Problem: Pain - Adult  Goal: Verbalizes/displays adequate comfort level or baseline comfort level  Outcome: Progressing     Problem: Safety - Adult  Goal: Free from fall injury  Outcome: Progressing     Problem: Chronic Conditions and Co-morbidities  Goal: Patient's chronic conditions and co-morbidity symptoms are monitored and maintained or improved  Outcome: Progressing     Problem: Pain  Goal: Free from acute confusion related to pain meds throughout the shift  Outcome: Progressing

## 2025-06-14 NOTE — PROGRESS NOTES
06/14/25 1314   Discharge Planning   Living Arrangements Spouse/significant other   Support Systems Spouse/significant other   Assistance Needed Independent for adls   Type of Residence Private residence   Number of Stairs to Enter Residence 3   Number of Stairs Within Residence 12   Home or Post Acute Services None  (PT/OT pending)   Expected Discharge Disposition Home  (PT/OT pending)   Does the patient need discharge transport arranged? No   Financial Resource Strain   How hard is it for you to pay for the very basics like food, housing, medical care, and heating? Not hard   Housing Stability   In the last 12 months, was there a time when you were not able to pay the mortgage or rent on time? N   At any time in the past 12 months, were you homeless or living in a shelter (including now)? N   Transportation Needs   In the past 12 months, has lack of transportation kept you from medical appointments or from getting medications? no   In the past 12 months, has lack of transportation kept you from meetings, work, or from getting things needed for daily living? No   Stroke Family Assessment   Stroke Family Assessment Needed No   Intensity of Service   Intensity of Service 0-30 min     Previous Home Care: NA  DME: has cane, walker, BSC at home but does not use  [facility]  Falls: 2 falls within the last two months  PCP: Mayda Carter; last visit 1-2 weeks ago  Met with patient at bedside, provided introduction of self and role. Demographics verified. Patient states he lives at home with wife. Independent for adls; safe at home. Patient states he normally drives self to appointments. Patient states no concerns obtaining/affording medications; states no social/financial concerns. Patient states wife will provide transport home at time of discharge. Care transitions team will continue to follow for discharge planning needs.     Maryanne ASTUDILLO, RN  Transitional Care Coordinator (TCC)  [phone]

## 2025-06-14 NOTE — PROGRESS NOTES
"Physical Therapy    Physical Therapy Evaluation    Patient Name: Adebayo Puentes \"Ed\"  MRN: [id number]  Department: [place] [address]  Room: [address] [place] [address]  Today's Date: 6/14/2025   Time Calculation  Start Time: 0925  Stop Time: 0950  Time Calculation (min): 25 min    Assessment/Plan   PT Assessment  PT Assessment Results: Decreased strength, Impaired balance, Pain  Rehab Prognosis: Good  Barriers to Discharge Home: Physical needs  Physical Needs: Stair navigation to access bed limited by function/safety, Stair navigation into home limited by function/safety, Intermittent mobility assistance needed, High falls risk due to function or environment  Strengths: Ability to acquire knowledge, Premorbid level of function  End of Session Communication: Bedside nurse  Assessment Comment: Patient s/p R retromastoid craniectomy for the treatment of trigeminal neuralgia. Patient presents with persistent but stable symptoms of lightheadedness, dizziness and demonstrates altered balance, dec strength. Patient to benefit from skilled PT services to address deficits.  End of Session Patient Position: Up in chair, Alarm off, not on at start of session  IP OR SWING BED PT PLAN  Inpatient or Swing Bed: Inpatient  PT Plan  Treatment/Interventions: Bed mobility, Transfer training, Gait training, Stair training, Balance training, Neuromuscular re-education, Therapeutic exercise  PT Plan: Ongoing PT  PT Frequency: 5 times per week  PT Discharge Recommendations: Low intensity level of continued care  Equipment Recommended upon Discharge: Wheeled walker  PT Recommended Transfer Status: Contact guard, Assistive device, Assist x1  PT - OK to Discharge: Yes (evaluation completed, recommendations documented)    Subjective     PT Visit Info:  PT Received On: 06/14/25  General Visit Information:  General  Reason for Referral: trigeminal neuralgia s/p R retromastoid craniectomy for microvascular decompression.  Past Medical History Relevant to Rehab: CAD, HTN, " hearing loss  Family/Caregiver Present: No  Prior to Session Communication: Bedside nurse  Patient Position Received: Up in chair, Alarm off, not on at start of session  General Comment: Patient agreeable to evaluation. reports LH, dizziness at rest with brief increase in symptoms with position change.  Home Living:  Home Living  Type of Home: House  Lives With: Spouse  Home Adaptive Equipment: Walker rolling or standard, Cane  Home Layout: Two level, 1/2 bath on main level, Stairs to alternate level with rails, Bed/bath upstairs (patients office in basement but does not need to access)  Alternate Level Stairs-Number of Steps: 12  Home Access: Stairs to enter with rails  Entrance Stairs-Rails: Left  Entrance Stairs-Number of Steps: 3  Home Living Comments: patient does note has accessible duplex in [facility].  Prior Level of Function:  Prior Function Per Pt/Caregiver Report  Level of [place]: Independent with ADLs and functional transfers, Independent with homemaking with ambulation  ADL Assistance: Independent  Homemaking Assistance: Independent  Ambulatory Assistance: Independent  Vocational: Retired  Leisure: enjoys time with his grandchildren  Precautions:  Precautions  Hearing/Visual Limitations: [place] at baseline, not wearing his hearing aides during session.  Medical Precautions: Fall precautions  Precautions Comment: per RN, HOB >/=20d     Vitals:   Seated at rest: 106/66, HR 62, Sp02 92% RA  After short activity: 123/81, HR 63, Sp02 94% RA  Post long walk/end of session: 113/71, HR 60, Sp02 95% RA     Objective   Pain:  Pain Assessment  Pain Assessment: 0-10  0-10 (Numeric) Pain Score: 6  Pain Location:  (forehead and R posterior to ear)  Cognition:  Cognition  Orientation Level: Oriented X4    General Assessments:  Sensation  Light Touch: No apparent deficits    Perception  Motor Planning: Appears intact      Coordination  Movements are Fluid and Coordinated: Yes    Static Sitting Balance  Static  Sitting-Balance Support: No upper extremity supported, Feet supported  Static Sitting-Level of Assistance: Independent  Dynamic Sitting Balance  Dynamic Sitting-Balance Support: No upper extremity supported, Feet supported  Dynamic Sitting-Level of Assistance: Independent    Static Standing Balance  Static Standing-Balance Support: Bilateral upper extremity supported  Static Standing-Level of Assistance: Contact guard  Dynamic Standing Balance  Dynamic Standing-Balance Support: Bilateral upper extremity supported  Dynamic Standing-Level of Assistance: Contact guard  Functional Assessments:  Bed Mobility  Bed Mobility: No (patient in chair for session)    Transfers  Transfer: Yes  Transfer 1  Technique 1: Sit to stand, Stand to sit  Transfer Device 1: Walker  Transfer Level of Assistance 1: Contact guard, Moderate verbal cues, Moderate tactile cues  Trials/Comments 1: patient transfered from bedside chair, use of arms on arm rest, increase time to complete  Transfers 2  Technique 2: Sit to stand, Stand to sit  Transfer Device 2: Walker  Transfer Level of Assistance 2: Contact guard, Moderate verbal cues, Moderate tactile cues  Trials/Comments 2: transfer from low bed surface, cues for hand placement, greater than one attempt before successful, mild unsteady noted with transfer, dec power. c/o brief inc in dizziness when returning to sit in chair    Ambulation/Gait Training  Ambulation/Gait Training Performed: Yes  Ambulation/Gait Training 1  Device 1: Rolling walker  Assistance 1: Contact guard, Minimal tactile cues, Moderate verbal cues  Quality of Gait 1: Wide base of support, Decreased step length, Forward flexed posture (dec gait speed, generalized cautiousness)  Comments/Distance (ft) 1: 5ft chair to bed for tolerance check then 100ft in hallway. 1 small LOB noted while navigating room, reports he tipped the RW, min A from therapist to right. Observed patient scanning environment with neck rotations and denies  worsening of symptoms.  Extremity/Trunk Assessments:  RUE AROM (degrees)  RUE AROM Comment: WFL  RUE Strength  RUE Overall Strength: Within Functional Limits - strength 5/5  LUE AROM (degrees)  LUE AROM Comment: WFL  LUE Strength  LUE Overall Strength:  (dec left  strength noted, UE grossly 4/5)  AROM RLE (degrees)  RLE AROM Comment: WFL  Strength RLE  R Hip Flexion: 4-/5  R Knee Flexion: 4+/5  R Knee Extension: 4+/5  R Ankle Dorsiflexion: 4+/5  AROM LLE (degrees)  LLE AROM Comment: WFL  Strength LLE  L Hip Flexion: 4-/5  L Knee Flexion: 4+/5  L Knee Extension: 4+/5  L Ankle Dorsiflexion: 4+/5  Outcome Measures:  [facility] Basic Mobility  Turning from your back to your side while in a flat bed without using bedrails: A little  Moving from lying on your back to sitting on the side of a flat bed without using bedrails: A little  Moving to and from bed to chair (including a wheelchair): A little  Standing up from a chair using your arms (e.g. wheelchair or bedside chair): A little  To walk in hospital room: A little  Climbing 3-5 steps with railing: A little  Basic Mobility - Total Score: 18    Encounter Problems       Encounter Problems (Active)       Balance       Patient to demonstrate low falls risk on standard measure testing for functional balance.       Start:  06/14/25    Expected End:  06/28/25               Mobility       Patient to ambulate 300ft with RW or no device with SBA        Start:  06/14/25    Expected End:  06/28/25            Patient to climb 3 steps to access home with SBA        Start:  06/14/25    Expected End:  06/28/25               PT Transfers       Bed transfers and sit to stand transfers with SBA       Start:  06/14/25    Expected End:  06/28/25               Pain - Adult              Education Documentation  Home Exercise Program, taught by Cris Boyle, PT at 6/14/2025 11:59 AM.  Learner: Patient  Readiness: Acceptance  Method: Explanation  Response: Verbalizes  Understanding    Mobility Training, taught by Cris Boyle, PT at 6/14/2025 11:59 AM.  Learner: Patient  Readiness: Acceptance  Method: Explanation  Response: Verbalizes Understanding    Education Comments  No comments found.

## 2025-06-15 LAB
ALBUMIN SERPL BCP-MCNC: 4.1 G/DL (ref 3.4–5)
ANION GAP SERPL CALC-SCNC: 12 MMOL/L (ref 10–20)
BUN SERPL-MCNC: 22 MG/DL (ref 6–23)
CALCIUM SERPL-MCNC: 9.5 MG/DL (ref 8.6–10.6)
CHLORIDE SERPL-SCNC: 99 MMOL/L (ref 98–107)
CO2 SERPL-SCNC: 30 MMOL/L (ref 21–32)
CREAT SERPL-MCNC: 1.2 MG/DL (ref 0.5–1.3)
EGFRCR SERPLBLD CKD-EPI 2021: 63 ML/MIN/1.73M*2
ERYTHROCYTE [DISTWIDTH] IN BLOOD BY AUTOMATED COUNT: 13 % (ref 11.5–14.5)
GLUCOSE SERPL-MCNC: 105 MG/DL (ref 74–99)
HCT VFR BLD AUTO: 37 % (ref 41–52)
HGB BLD-MCNC: 12 G/DL (ref 13.5–17.5)
MAGNESIUM SERPL-MCNC: 1.8 MG/DL (ref 1.6–2.4)
MCH RBC QN AUTO: 30.9 PG (ref 26–34)
MCHC RBC AUTO-ENTMCNC: 32.4 G/DL (ref 32–36)
MCV RBC AUTO: 95 FL (ref 80–100)
NRBC BLD-RTO: 0 /100 WBCS (ref 0–0)
PHOSPHATE SERPL-MCNC: 2.4 MG/DL (ref 2.5–4.9)
PLATELET # BLD AUTO: 152 X10*3/UL (ref 150–450)
POTASSIUM SERPL-SCNC: 4.1 MMOL/L (ref 3.5–5.3)
RBC # BLD AUTO: 3.88 X10*6/UL (ref 4.5–5.9)
SODIUM SERPL-SCNC: 137 MMOL/L (ref 136–145)
WBC # BLD AUTO: 7.5 X10*3/UL (ref 4.4–11.3)

## 2025-06-15 PROCEDURE — 36415 COLL VENOUS BLD VENIPUNCTURE: CPT | Performed by: STUDENT IN AN ORGANIZED HEALTH CARE EDUCATION/TRAINING PROGRAM

## 2025-06-15 PROCEDURE — 1200000002 HC GENERAL ROOM WITH TELEMETRY DAILY

## 2025-06-15 PROCEDURE — 85027 COMPLETE CBC AUTOMATED: CPT | Performed by: STUDENT IN AN ORGANIZED HEALTH CARE EDUCATION/TRAINING PROGRAM

## 2025-06-15 PROCEDURE — 2500000004 HC RX 250 GENERAL PHARMACY W/ HCPCS (ALT 636 FOR OP/ED): Performed by: STUDENT IN AN ORGANIZED HEALTH CARE EDUCATION/TRAINING PROGRAM

## 2025-06-15 PROCEDURE — 2500000001 HC RX 250 WO HCPCS SELF ADMINISTERED DRUGS (ALT 637 FOR MEDICARE OP): Performed by: STUDENT IN AN ORGANIZED HEALTH CARE EDUCATION/TRAINING PROGRAM

## 2025-06-15 PROCEDURE — 80069 RENAL FUNCTION PANEL: CPT | Performed by: STUDENT IN AN ORGANIZED HEALTH CARE EDUCATION/TRAINING PROGRAM

## 2025-06-15 PROCEDURE — 83735 ASSAY OF MAGNESIUM: CPT | Performed by: STUDENT IN AN ORGANIZED HEALTH CARE EDUCATION/TRAINING PROGRAM

## 2025-06-15 PROCEDURE — 2500000002 HC RX 250 W HCPCS SELF ADMINISTERED DRUGS (ALT 637 FOR MEDICARE OP, ALT 636 FOR OP/ED): Performed by: STUDENT IN AN ORGANIZED HEALTH CARE EDUCATION/TRAINING PROGRAM

## 2025-06-15 RX ADMIN — OXYCODONE 5 MG: 5 TABLET ORAL at 20:39

## 2025-06-15 RX ADMIN — ACETAMINOPHEN 650 MG: 325 TABLET, FILM COATED ORAL at 17:47

## 2025-06-15 RX ADMIN — MONTELUKAST 10 MG: 10 TABLET, FILM COATED ORAL at 08:33

## 2025-06-15 RX ADMIN — HYDROCHLOROTHIAZIDE: 25 TABLET ORAL at 08:33

## 2025-06-15 RX ADMIN — OXYCODONE HYDROCHLORIDE AND ACETAMINOPHEN 1000 MG: 500 TABLET ORAL at 08:33

## 2025-06-15 RX ADMIN — SENNOSIDES AND DOCUSATE SODIUM 2 TABLET: 50; 8.6 TABLET ORAL at 20:39

## 2025-06-15 RX ADMIN — ACETAMINOPHEN 650 MG: 325 TABLET, FILM COATED ORAL at 11:33

## 2025-06-15 RX ADMIN — OXCARBAZEPINE 600 MG: 600 TABLET, FILM COATED ORAL at 08:32

## 2025-06-15 RX ADMIN — ESCITALOPRAM OXALATE 20 MG: 10 TABLET ORAL at 08:33

## 2025-06-15 RX ADMIN — ATORVASTATIN CALCIUM 40 MG: 40 TABLET, FILM COATED ORAL at 08:33

## 2025-06-15 RX ADMIN — ATENOLOL 25 MG: 25 TABLET ORAL at 08:33

## 2025-06-15 RX ADMIN — Medication 1 TABLET: at 08:32

## 2025-06-15 RX ADMIN — Medication 125 MCG: at 08:32

## 2025-06-15 RX ADMIN — OXCARBAZEPINE 600 MG: 600 TABLET, FILM COATED ORAL at 20:39

## 2025-06-15 RX ADMIN — PANTOPRAZOLE SODIUM 20 MG: 20 TABLET, DELAYED RELEASE ORAL at 15:03

## 2025-06-15 RX ADMIN — GABAPENTIN 200 MG: 100 CAPSULE ORAL at 08:33

## 2025-06-15 RX ADMIN — SENNOSIDES AND DOCUSATE SODIUM 2 TABLET: 50; 8.6 TABLET ORAL at 08:33

## 2025-06-15 RX ADMIN — OXYBUTYNIN CHLORIDE 5 MG: 5 TABLET ORAL at 08:33

## 2025-06-15 RX ADMIN — POLYETHYLENE GLYCOL 3350 17 G: 17 POWDER, FOR SOLUTION ORAL at 08:32

## 2025-06-15 RX ADMIN — OXYBUTYNIN CHLORIDE 5 MG: 5 TABLET ORAL at 15:03

## 2025-06-15 RX ADMIN — Medication 1 TABLET: at 08:33

## 2025-06-15 RX ADMIN — OXYBUTYNIN CHLORIDE 5 MG: 5 TABLET ORAL at 20:39

## 2025-06-15 RX ADMIN — TRAZODONE HYDROCHLORIDE 100 MG: 50 TABLET ORAL at 20:39

## 2025-06-15 RX ADMIN — GABAPENTIN 200 MG: 100 CAPSULE ORAL at 20:39

## 2025-06-15 RX ADMIN — ACETAMINOPHEN 650 MG: 325 TABLET, FILM COATED ORAL at 05:54

## 2025-06-15 RX ADMIN — HEPARIN SODIUM 5000 UNITS: 5000 INJECTION, SOLUTION INTRAVENOUS; SUBCUTANEOUS at 08:33

## 2025-06-15 RX ADMIN — AMLODIPINE BESYLATE 10 MG: 10 TABLET ORAL at 08:33

## 2025-06-15 RX ADMIN — PANTOPRAZOLE SODIUM 20 MG: 20 TABLET, DELAYED RELEASE ORAL at 05:54

## 2025-06-15 RX ADMIN — HEPARIN SODIUM 5000 UNITS: 5000 INJECTION, SOLUTION INTRAVENOUS; SUBCUTANEOUS at 15:03

## 2025-06-15 ASSESSMENT — COGNITIVE AND FUNCTIONAL STATUS - GENERAL
MOBILITY SCORE: 24
DAILY ACTIVITIY SCORE: 24

## 2025-06-15 ASSESSMENT — PAIN - FUNCTIONAL ASSESSMENT: PAIN_FUNCTIONAL_ASSESSMENT: 0-10

## 2025-06-15 ASSESSMENT — PAIN SCALES - GENERAL: PAINLEVEL_OUTOF10: 0 - NO PAIN

## 2025-06-15 NOTE — PROGRESS NOTES
"  NEUROSURGERY PROGRESS NOTE    Adebayo Puentes \"Ed\" is a 74 y.o. male on day 2 of admission presenting with Trigeminal neuralgia.    Subjective   Reports improved R V1-3 pain. Doing well postop. Would like to stay inpatient until Monday        Objective     Physical Exam  Exam:  Aox3  Cranial nerves grossly intact  BUE 5/5  BLE 5/5  Improved R V1-3 pain    Last Recorded Vitals  Blood pressure 164/83, pulse 54, temperature 36.7 °C (98.1 °F), resp. rate 20, height 1.905 m (6' 3\"), weight 99.5 kg (219 lb 5.7 oz), SpO2 94%.     Intake/Output last 3 Shifts:  I/O last 3 completed shifts:  In: 1176.7 (11.8 mL/kg) [P.O.:1000; I.V.:176.7 (1.8 mL/kg)]  Out: 1935 (19.4 mL/kg) [Urine:1935 (0.5 mL/kg/hr)]  Weight: 99.5 kg     Labs:  Lab Results   Component Value Date    WBC 11.3 06/14/2025    HGB 13.2 (L) 06/14/2025    HCT 40.4 (L) 06/14/2025    MCV 99 06/14/2025     06/14/2025      Lab Results   Component Value Date    GLUCOSE 137 (H) 06/14/2025    CALCIUM 10.2 06/14/2025     06/14/2025    K 4.0 06/14/2025    CO2 32 06/14/2025     06/14/2025    BUN 31 (H) 06/14/2025    CREATININE 1.27 06/14/2025      Lab Results   Component Value Date    CREATININE 1.27 06/14/2025    BUN 31 (H) 06/14/2025     06/14/2025    K 4.0 06/14/2025     06/14/2025    CO2 32 06/14/2025      Lab Results   Component Value Date    CALCIUM 10.2 06/14/2025    PHOS 3.2 06/14/2025      Lab Results   Component Value Date    INR 1.1 06/03/2025    INR 1.0 09/08/2023    INR 1.2 (H) 02/24/2023    PROTIME 11.9 06/03/2025    PROTIME 9.9 09/08/2023    PROTIME 13.7 (H) 02/24/2023        Imaging:  No orders to display             Assessment/Plan   Assessment & Plan  Trigeminal neuralgia    75 yo male with h/o HTN, HLD, GERD s/p Nissen (1980s) and dilation (2024), BPH s/p TERP (2020), CKD, prior MI on asa, TGN s/p SRS p/w refractory pain, 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

## 2025-06-15 NOTE — CARE PLAN
Problem: Pain - Adult  Goal: Verbalizes/displays adequate comfort level or baseline comfort level  Outcome: Progressing     Problem: Safety - Adult  Goal: Free from fall injury  Outcome: Progressing   The patient's goals for the shift include      The clinical goals for the shift include Pt safety will be maintained in duration of the shift    Over the shift, the patient did make progress toward the following goals.

## 2025-06-16 VITALS
DIASTOLIC BLOOD PRESSURE: 77 MMHG | HEART RATE: 58 BPM | WEIGHT: 219.36 LBS | TEMPERATURE: 98.2 F | OXYGEN SATURATION: 95 % | SYSTOLIC BLOOD PRESSURE: 138 MMHG | BODY MASS INDEX: 27.27 KG/M2 | RESPIRATION RATE: 20 BRPM | HEIGHT: 75 IN

## 2025-06-16 PROCEDURE — 2500000004 HC RX 250 GENERAL PHARMACY W/ HCPCS (ALT 636 FOR OP/ED): Performed by: STUDENT IN AN ORGANIZED HEALTH CARE EDUCATION/TRAINING PROGRAM

## 2025-06-16 PROCEDURE — 97165 OT EVAL LOW COMPLEX 30 MIN: CPT | Mod: GO

## 2025-06-16 PROCEDURE — 97530 THERAPEUTIC ACTIVITIES: CPT | Mod: GP

## 2025-06-16 PROCEDURE — 2500000002 HC RX 250 W HCPCS SELF ADMINISTERED DRUGS (ALT 637 FOR MEDICARE OP, ALT 636 FOR OP/ED): Performed by: STUDENT IN AN ORGANIZED HEALTH CARE EDUCATION/TRAINING PROGRAM

## 2025-06-16 PROCEDURE — 97116 GAIT TRAINING THERAPY: CPT | Mod: GP

## 2025-06-16 PROCEDURE — 2500000001 HC RX 250 WO HCPCS SELF ADMINISTERED DRUGS (ALT 637 FOR MEDICARE OP): Performed by: STUDENT IN AN ORGANIZED HEALTH CARE EDUCATION/TRAINING PROGRAM

## 2025-06-16 RX ORDER — POLYETHYLENE GLYCOL 3350 17 G/17G
17 POWDER, FOR SOLUTION ORAL DAILY
Qty: 6 PACKET | Refills: 0 | Status: SHIPPED | OUTPATIENT
Start: 2025-06-17 | End: 2025-06-23

## 2025-06-16 RX ORDER — ASPIRIN 81 MG/1
81 TABLET ORAL DAILY
Start: 2025-06-16

## 2025-06-16 RX ORDER — ACETAMINOPHEN 325 MG/1
650 TABLET ORAL EVERY 6 HOURS
Qty: 56 TABLET | Refills: 0 | Status: SHIPPED | OUTPATIENT
Start: 2025-06-16 | End: 2025-06-23

## 2025-06-16 RX ORDER — OXYCODONE HYDROCHLORIDE 5 MG/1
5 TABLET ORAL EVERY 6 HOURS PRN
Qty: 15 TABLET | Refills: 0 | Status: SHIPPED | OUTPATIENT
Start: 2025-06-16 | End: 2025-06-23

## 2025-06-16 RX ORDER — AMOXICILLIN 250 MG
2 CAPSULE ORAL 2 TIMES DAILY
Qty: 28 TABLET | Refills: 0 | Status: SHIPPED | OUTPATIENT
Start: 2025-06-16 | End: 2025-06-23

## 2025-06-16 RX ORDER — CYCLOBENZAPRINE HCL 10 MG
10 TABLET ORAL 3 TIMES DAILY PRN
Qty: 21 TABLET | Refills: 0 | Status: SHIPPED | OUTPATIENT
Start: 2025-06-16 | End: 2025-06-23

## 2025-06-16 RX ADMIN — Medication 125 MCG: at 08:34

## 2025-06-16 RX ADMIN — SENNOSIDES AND DOCUSATE SODIUM 2 TABLET: 50; 8.6 TABLET ORAL at 08:34

## 2025-06-16 RX ADMIN — ATORVASTATIN CALCIUM 40 MG: 40 TABLET, FILM COATED ORAL at 08:34

## 2025-06-16 RX ADMIN — OXYCODONE HYDROCHLORIDE AND ACETAMINOPHEN 1000 MG: 500 TABLET ORAL at 08:34

## 2025-06-16 RX ADMIN — Medication 1 TABLET: at 08:33

## 2025-06-16 RX ADMIN — PANTOPRAZOLE SODIUM 20 MG: 20 TABLET, DELAYED RELEASE ORAL at 06:10

## 2025-06-16 RX ADMIN — MONTELUKAST 10 MG: 10 TABLET, FILM COATED ORAL at 08:34

## 2025-06-16 RX ADMIN — ATENOLOL 25 MG: 25 TABLET ORAL at 08:34

## 2025-06-16 RX ADMIN — HEPARIN SODIUM 5000 UNITS: 5000 INJECTION, SOLUTION INTRAVENOUS; SUBCUTANEOUS at 01:40

## 2025-06-16 RX ADMIN — POLYETHYLENE GLYCOL 3350 17 G: 17 POWDER, FOR SOLUTION ORAL at 08:34

## 2025-06-16 RX ADMIN — GABAPENTIN 200 MG: 100 CAPSULE ORAL at 08:34

## 2025-06-16 RX ADMIN — HYDROCHLOROTHIAZIDE: 25 TABLET ORAL at 08:33

## 2025-06-16 RX ADMIN — HEPARIN SODIUM 5000 UNITS: 5000 INJECTION, SOLUTION INTRAVENOUS; SUBCUTANEOUS at 08:34

## 2025-06-16 RX ADMIN — AMLODIPINE BESYLATE 10 MG: 10 TABLET ORAL at 08:33

## 2025-06-16 RX ADMIN — OXYBUTYNIN CHLORIDE 5 MG: 5 TABLET ORAL at 08:34

## 2025-06-16 RX ADMIN — ACETAMINOPHEN 650 MG: 325 TABLET, FILM COATED ORAL at 01:40

## 2025-06-16 RX ADMIN — OXCARBAZEPINE 600 MG: 600 TABLET, FILM COATED ORAL at 08:34

## 2025-06-16 RX ADMIN — Medication 1 TABLET: at 08:34

## 2025-06-16 RX ADMIN — ACETAMINOPHEN 650 MG: 325 TABLET, FILM COATED ORAL at 11:31

## 2025-06-16 RX ADMIN — ACETAMINOPHEN 650 MG: 325 TABLET, FILM COATED ORAL at 06:10

## 2025-06-16 RX ADMIN — ESCITALOPRAM OXALATE 20 MG: 10 TABLET ORAL at 08:33

## 2025-06-16 ASSESSMENT — ACTIVITIES OF DAILY LIVING (ADL)
ADL_ASSISTANCE: INDEPENDENT
BATHING_ASSISTANCE: STAND BY

## 2025-06-16 ASSESSMENT — COGNITIVE AND FUNCTIONAL STATUS - GENERAL
MOBILITY SCORE: 23
MOVING TO AND FROM BED TO CHAIR: A LITTLE
STANDING UP FROM CHAIR USING ARMS: A LITTLE
DAILY ACTIVITIY SCORE: 24
DRESSING REGULAR LOWER BODY CLOTHING: A LITTLE
DAILY ACTIVITIY SCORE: 23
CLIMB 3 TO 5 STEPS WITH RAILING: A LITTLE
MOBILITY SCORE: 20
CLIMB 3 TO 5 STEPS WITH RAILING: A LITTLE
WALKING IN HOSPITAL ROOM: A LITTLE

## 2025-06-16 ASSESSMENT — PAIN - FUNCTIONAL ASSESSMENT
PAIN_FUNCTIONAL_ASSESSMENT: 0-10

## 2025-06-16 ASSESSMENT — PAIN SCALES - GENERAL
PAINLEVEL_OUTOF10: 0 - NO PAIN

## 2025-06-16 NOTE — DISCHARGE SUMMARY
Discharge Diagnosis  Trigeminal neuralgia    Test Results Pending At Discharge  Pending Labs       No current pending labs.            Hospital Course  Adebayo Puentes is a 74 y.o. 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         PT/OT evaluated patient and recommended low level of contined therapy for which patient was provided with outpatient prescriptions.     On the day of discharge, the patient was seen and evaluated by the neurosurgery team and deemed suitable for discharge. The patient was given detailed discharge instructions and were scheduled to follow up as an outpatient.     Visit Vitals  /77   Pulse 58   Temp 36.8 °C (98.2 °F)   Resp 20     Vitals:    06/13/25 0840   Weight: 99.5 kg (219 lb 5.7 oz)       Immunization History   Administered Date(s) Administered    COVID-19, mRNA, LNP-S, PF, 30 mcg/0.3 mL dose 02/02/2021, 02/24/2021, 10/03/2021    Flu vaccine (IIV4), preservative free *Check age/dose* 09/28/2016    Flu vaccine, quadrivalent, high-dose, preservative free, age 65y+ (FLUZONE) 09/18/2020, 10/03/2021, 09/30/2022    Flu vaccine, trivalent, preservative free, HIGH-DOSE, age 65y+ (Fluzone) 09/09/2017, 09/11/2018, 09/23/2019    Hep A / Hep B 11/22/2011, 11/29/2011, 12/13/2011    Influenza, Unspecified 10/01/2020    Influenza, seasonal, injectable 01/19/2016, 10/03/2023    PPD Test 12/21/2017, 12/29/2017    Pfizer COVID-19 vaccine, 12 years and older, (30mcg/0.3mL) (Comirnaty) 10/28/2024    Pfizer COVID-19 vaccine, bivalent, age 12 years and older (30 mcg/0.3 mL) 09/30/2022    Pneumococcal conjugate vaccine, 13-valent (PREVNAR 13) 07/27/2015, 09/18/2020    Pneumococcal polysaccharide vaccine, 23-valent, age 2 years and older (PNEUMOVAX 23) 12/19/2016    Polio, Unspecified 11/29/2011    Poliovirus vaccine, subcutaneous (IPOL) 10/13/2023    SARS-CoV-2,  Unspecified 10/03/2023    Tdap vaccine, age 7 year and older (BOOSTRIX, ADACEL) 11/29/2011, 10/11/2023    Typhoid, ViCPs 10/11/2023    Zoster vaccine, recombinant, adult (SHINGRIX) 08/24/2018, 10/04/2018, 11/18/2018, 01/04/2019    Zoster, live 07/27/2013       Pertinent Physical Exam At Time of Discharge  Physical Exam  Constitutional: A&Ox3, calm and cooperative, NAD.  Eyes: PERRL, EOMI.   ENMT: Moist mucous membranes, no apparent injuries or lesions.  Cardiovascular: Normal rate and regular rhythm. 2+ equal pulses of the distal extremities.  Respiratory/Thorax: CTAB, regular respirations on RA. Good symmetric chest expansion.   Gastrointestinal: Abdomen soft, non tender.    Neurological:   Aox3  Cranial nerves grossly intact  BUE 5/5  BLE 5/5  Improved R V1-3 pain    Psychological: Appropriate mood and behavior.   Skin: Warm and dry. Incision c/d/i        Home Medications     Medication List      START taking these medications     acetaminophen 325 mg tablet; Commonly known as: Tylenol; Take 2 tablets   (650 mg) by mouth every 6 hours for 7 days.   cyclobenzaprine 10 mg tablet; Commonly known as: Flexeril; Take 1 tablet   (10 mg) by mouth 3 times a day as needed for muscle spasms for up to 7   days.   oxyCODONE 5 mg immediate release tablet; Commonly known as: Roxicodone;   Take 1 tablet (5 mg) by mouth every 6 hours if needed for severe pain (7 -   10) for up to 7 days.   polyethylene glycol 17 gram packet; Commonly known as: Glycolax,   Miralax; Take 17 g by mouth once daily for 6 days.; Start taking on: June 17, 2025   sennosides-docusate sodium 8.6-50 mg tablet; Commonly known as:   Monica-Colace; Take 2 tablets by mouth 2 times a day for 7 days.     CHANGE how you take these medications     aspirin 81 mg EC tablet; Take 1 tablet (81 mg) by mouth once daily. Do   not start Aspirin until 6/18/2025.; What changed: additional instructions     CONTINUE taking these medications     amLODIPine 10 mg tablet; Commonly  known as: Norvasc; Take 1 tablet (10   mg) by mouth once daily.   ascorbic acid (vitamin C) 1,000 mg ER tablet   atenolol 25 mg tablet; Commonly known as: Tenormin; Take 1 tablet (25   mg) by mouth once daily.   atorvastatin 40 mg tablet; Commonly known as: Lipitor; Take 1 tablet (40   mg) by mouth once daily.   Caltrate with Vitamin D3 600 mg-20 mcg (800 unit) tablet; Generic drug:   calcium carbonate-vitamin D3   escitalopram 20 mg tablet; Commonly known as: Lexapro; Take 1 tablet (20   mg) by mouth once daily.   esomeprazole 20 mg DR capsule; Commonly known as: NexIUM   gabapentin 100 mg capsule; Commonly known as: Neurontin; Take 2 capsules   (200 mg) by mouth 2 times a day.   losartan-hydrochlorothiazide 100-25 mg tablet; Commonly known as:   Hyzaar; Take 1 tablet by mouth once daily.   montelukast 10 mg tablet; Commonly known as: Singulair; Take 1 tablet   (10 mg) by mouth once daily.   multivitamin with minerals iron-free; Commonly known as: Centrum Silver   Myrbetriq 50 mg tablet extended release 24 hr 24 hr tablet; Generic   drug: mirabegron; Take 1 tablet (50 mg) by mouth once daily.   OXcarbazepine 300 mg tablet; Commonly known as: Trileptal; 2 tablets 2   times daily   traZODone 100 mg tablet; Commonly known as: Desyrel; Take 1 tablet (100   mg) by mouth once daily at bedtime.   Vitamin D3 125 mcg (5,000 units) tablet; Generic drug: cholecalciferol     STOP taking these medications     chlorhexidine 0.12 % solution; Commonly known as: Peridex   chlorhexidine 4 % external liquid; Commonly known as: Hibiclens       Outpatient Follow-Up  Future Appointments   Date Time Provider Department Center   6/24/2025 10:45 AM Virginia Delgado MD [id number] [facility]   6/25/2025  1:00 PM Rohit Riddle MD [id number] [facility]   9/18/2025  9:50 AM Skyler Schmitt MD [id number] [facility]   9/23/2025 10:30 AM Gabriella Martinez APRN-CNP [id number] [facility]   11/14/2025  9:20 AM Hamzah Chino APRN-CNP [id number] [facility]   12/5/2025  7:30  AM Mayda Carter MD [id number] [facility]   5/6/2026  9:10 AM Yadira Fuentes MD [id number] [facility]       [place], APRN-CNP

## 2025-06-16 NOTE — PROGRESS NOTES
"Physical Therapy    Physical Therapy Treatment    Patient Name: Adebayo Puentes \"Ed\"  MRN: [id number]  Department:   Room: [address] [place], [place] [address]  Today's Date: 6/16/2025  Time Calculation  Start Time: 0930  Stop Time: 0954  Time Calculation (min): 24 min    Assessment/Plan   PT Assessment  Barriers to Discharge Home: No anticipated barriers  Evaluation/Treatment Tolerance: Patient tolerated treatment well  Medical Staff Made Aware: Yes  End of Session Communication: Bedside nurse  Assessment Comment: Pt motivated with excellent effort.  Able to ambulate community distance and navigate stairs.  SBA but with noted higher level balance deficits.  Remains appropriate for low intensity PT at time of d/c.  End of Session Patient Position: Up in chair, Alarm off, not on at start of session     PT Plan  Treatment/Interventions: Bed mobility, Transfer training, Gait training, Stair training, Balance training, Neuromuscular re-education, Therapeutic exercise  PT Plan: Ongoing PT  PT Frequency: 5 times per week  PT Discharge Recommendations: Low intensity level of continued care  Equipment Recommended upon Discharge: Wheeled walker  PT Recommended Transfer Status: Assist x1, Stand by assist  PT - OK to Discharge: Yes (evaluation completed, recommendations documented)    PT Visit Info:  PT Received On: 06/16/25  Response to Previous Treatment: Patient with no complaints from previous session.     General Visit Information:   General  Prior to Session Communication: Bedside nurse  Patient Position Received: Up in chair, Alarm off, not on at start of session  General Comment: Up in chair.  Pt pleasant, cooperative and agreeable to PT.  Pt able to ambulate community distance and navigate stairs.  Pt without LOB, but reporting balance feeling off.  Looking forward to d/c and continuing with PT at home.    Subjective   Precautions:  Precautions  Medical Precautions: Fall precautions    Objective   Pain:  Pain Assessment  Pain Assessment: 0-10  0-10 " (Numeric) Pain Score: 0 - No pain  Cognition:  Cognition  Overall Cognitive Status: Within Functional Limits  Arousal/Alertness: Appropriate responses to stimuli  Orientation Level: Oriented X4  Following Commands: Follows all commands and directions without difficulty    Activity Tolerance:  Activity Tolerance  Endurance: Endurance does not limit participation in activity  Treatments:    Bed Mobility  Bed Mobility: No    Ambulation/Gait Training  Ambulation/Gait Training Performed: Yes  Ambulation/Gait Training 1  Surface 1: Level tile, Carpet  Device 1: No device  Assistance 1: Close supervision, Minimal verbal cues  Quality of Gait 1: Wide base of support, Decreased step length, Forward flexed posture (Occasaionl listing to R)  Comments/Distance (ft) 1: 100 feet, 200 feet  Transfer 1  Transfer From 1: Sit to, Stand to  Transfer to 1: Sit  Transfer Level of Assistance 1: Close supervision, Minimal verbal cues    Stairs  Stairs: Yes  Stairs  Rails 1: Right  Device 1: Railing  Assistance 1: Contact guard, Minimal verbal cues  Comment/Number of Steps 1: Up/dn 12    Outcome Measures:    [facility] Basic Mobility  Turning from your back to your side while in a flat bed without using bedrails: None  Moving from lying on your back to sitting on the side of a flat bed without using bedrails: None  Moving to and from bed to chair (including a wheelchair): A little  Standing up from a chair using your arms (e.g. wheelchair or bedside chair): A little  To walk in hospital room: A little  Climbing 3-5 steps with railing: A little  Basic Mobility - Total Score: 20    Education Documentation  Home Exercise Program, taught by Rohit Guadalupe, PT at 6/16/2025  3:19 PM.  Learner: Patient  Readiness: Eager  Method: Explanation  Response: Verbalizes Understanding    Mobility Training, taught by Rohit Guadalupe PT at 6/16/2025  3:19 PM.  Learner: Patient  Readiness: Eager  Method: Explanation  Response: Verbalizes  Understanding    Education Comments  No comments found.        OP EDUCATION:       Encounter Problems       Encounter Problems (Active)       Balance       Patient to demonstrate low falls risk on standard measure testing for functional balance. (Progressing)       Start:  06/14/25    Expected End:  06/28/25               Mobility       Patient to ambulate 300ft with RW or no device with SBA  (Progressing)       Start:  06/14/25    Expected End:  06/28/25            Patient to climb 3 steps to access home with SBA  (Progressing)       Start:  06/14/25    Expected End:  06/28/25               PT Transfers       Bed transfers and sit to stand transfers with SBA (Progressing)       Start:  06/14/25    Expected End:  06/28/25               Pain - Adult

## 2025-06-16 NOTE — PROGRESS NOTES
"  NEUROSURGERY PROGRESS NOTE    Adebayo Puentes \"Ed\" is a 74 y.o. male on day 3 of admission presenting with Trigeminal neuralgia.    Subjective   Slight V3 numbness but ow no pain       Objective     Physical Exam  Exam:  Aox3  Cranial nerves grossly intact  BUE 5/5  BLE 5/5  Improved R V1-3 pain    Last Recorded Vitals  Blood pressure 157/75, pulse 55, temperature 37.1 °C (98.8 °F), temperature source Temporal, resp. rate 19, height 1.905 m (6' 3\"), weight 99.5 kg (219 lb 5.7 oz), SpO2 95%.     Intake/Output last 3 Shifts:  I/O last 3 completed shifts:  In: 690 (6.9 mL/kg) [P.O.:690]  Out: 1450 (14.6 mL/kg) [Urine:1450 (0.4 mL/kg/hr)]  Weight: 99.5 kg     Labs:  Lab Results   Component Value Date    WBC 7.5 06/15/2025    HGB 12.0 (L) 06/15/2025    HCT 37.0 (L) 06/15/2025    MCV 95 06/15/2025     06/15/2025      Lab Results   Component Value Date    GLUCOSE 105 (H) 06/15/2025    CALCIUM 9.5 06/15/2025     06/15/2025    K 4.1 06/15/2025    CO2 30 06/15/2025    CL 99 06/15/2025    BUN 22 06/15/2025    CREATININE 1.20 06/15/2025      Lab Results   Component Value Date    CREATININE 1.20 06/15/2025    BUN 22 06/15/2025     06/15/2025    K 4.1 06/15/2025    CL 99 06/15/2025    CO2 30 06/15/2025      Lab Results   Component Value Date    CALCIUM 9.5 06/15/2025    PHOS 2.4 (L) 06/15/2025      Lab Results   Component Value Date    INR 1.1 06/03/2025    INR 1.0 09/08/2023    INR 1.2 (H) 02/24/2023    PROTIME 11.9 06/03/2025    PROTIME 9.9 09/08/2023    PROTIME 13.7 (H) 02/24/2023        Imaging:  No orders to display             Assessment/Plan   Assessment & Plan  Trigeminal neuralgia    73 yo male with h/o HTN, HLD, GERD s/p Nissen (1980s) and dilation (2024), BPH s/p TERP (2020), CKD, prior MI on asa, TGN s/p SRS p/w refractory pain, 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

## 2025-06-16 NOTE — PROGRESS NOTES
"Occupational Therapy      Evaluation    Patient Name: Adebayo Puentes \"Ed\"  MRN: [id number]  Today's Date: 6/16/2025  Room: 203/203-A  Time Calculation  Start Time: 0841  Stop Time: 0900  Time Calculation (min): 19 min    Assessment  IP OT Assessment  OT Assessment: Pt with no acute OT needs at this time, will benefit from Low intensity OT to maximize safety with I/ADLs and functional mobility in home environment.  Prognosis: Excellent  Barriers to Discharge Home: No anticipated barriers  Evaluation/Treatment Tolerance: Patient tolerated treatment well  End of Session Communication: Bedside nurse  End of Session Patient Position: Up in room, Alarm off, not on at start of session  Plan:  Inpatient Plan  OT Frequency: OT eval only  OT Discharge Recommendations: Low intensity level of continued care  Equipment Recommended upon Discharge:  (shower chair)  OT Recommended Transfer Status: Stand by assist  OT - OK to Discharge: Yes  OT Assessment  Prognosis: Excellent  Evaluation/Treatment Tolerance: Patient tolerated treatment well    Subjective     General:  Reason for Referral: trigeminal neuralgia s/p R retromastoid craniectomy for microvascular decompression.  Past Medical History Relevant to Rehab: CAD, HTN, hearing loss  Prior to Session Communication: Bedside nurse  Patient Position Received:  (Up in room, seated on bedside couch)  General Comment: Pt pleasant and willing to participate in OT   Precautions:  Medical Precautions: Fall precautions  Precautions Comment: HOB >/= 20 degree      Pain:  Pain Assessment  Pain Assessment: 0-10  0-10 (Numeric) Pain Score: 0 - No pain           Objective   Cognition:  Orientation Level: Oriented X4           Home Living:  Type of Home: House  Lives With: Spouse  Home Adaptive Equipment: Walker rolling or standard, Cane  Home Layout: Two level, 1/2 bath on main level, Stairs to alternate level with rails, Bed/bath upstairs  Home Access: Stairs to enter with rails  Entrance " Stairs-Rails: Left  Entrance Stairs-Number of Steps: 3  Bathroom Shower/Tub: Walk-in shower  Bathroom Equipment: Grab bars in shower   Prior Function:  Level of [place]: Independent with ADLs and functional transfers, Independent with homemaking with ambulation  ADL Assistance: Independent  Homemaking Assistance: Independent  Ambulatory Assistance: Independent  Vocational: Retired  Leisure: enjoys time with his grandchildren  Prior Function Comments: + driving, reports one fall 3 m onths ago while walking up one step from family room       ADL:  Eating Assistance: Independent  Eating Deficit: Setup  Grooming Assistance: Stand by  Grooming Deficit: Setup  Bathing Assistance: Stand by  Bathing Deficit: Setup (anticipate)  UE Dressing Assistance: Independent  UE Dressing Deficit: Setup  LE Dressing Assistance: Stand by  LE Dressing Deficit: Setup  Toileting Assistance with Device: Stand by  Toileting Deficit: Setup  Activity Tolerance:  Endurance: Endurance does not limit participation in activity       Bed Mobility/Transfers: Bed Mobility  Bed Mobility: No  Functional Mobility  Functional Mobility Performed: Yes  Functional Mobility 1  Surface 1: Level tile, Carpet  Device 1: No device  Assistance 1: Close supervision  Comments 1: room and hallway distance   and Transfers  Transfer: Yes  Transfer 1  Transfer From 1: Sit to, Stand to  Transfer to 1: Stand, Sit  Technique 1: Sit to stand, Stand to sit  Transfer Level of Assistance 1: Close supervision       Vision: Vision - Basic Assessment  Current Vision: Wears glasses all the time  Patient Visual Report:  (pt reporting medications causing increased blurried vision at times)   and    Sensation:  Light Touch: No apparent deficits  Strength:  Strength Comments: WFL  Perception:  Inattention/Neglect: Appears intact  Coordination:  Movements are Fluid and Coordinated: Yes   Hand Function:  Hand Function  Gross Grasp: Functional  Coordination: Functional  Extremities:  RUE   RUE : Within Functional Limits, LUE   LUE: Within Functional Limits, RLE   RLE : Within Functional Limits, and LLE   LLE : Within Functional Limits    Outcome Measures: [facility] Daily Activity  Putting on and taking off regular lower body clothing: A little  Bathing (including washing, rinsing, drying): None  Putting on and taking off regular upper body clothing: None  Toileting, which includes using toilet, bedpan or urinal: None  Taking care of personal grooming such as brushing teeth: None  Eating Meals: None  Daily Activity - Total Score: 23         ,     OT Adult Other Outcome Measures  4AT: negative    Education Documentation  Body Mechanics, taught by Prema Cordero OT at 6/16/2025 11:44 AM.  Learner: Patient  Readiness: Acceptance  Method: Explanation  Response: Verbalizes Understanding    Precautions, taught by Prema Cordero OT at 6/16/2025 11:44 AM.  Learner: Patient  Readiness: Acceptance  Method: Explanation  Response: Verbalizes Understanding    ADL Training, taught by Prema Cordero OT at 6/16/2025 11:44 AM.  Learner: Patient  Readiness: Acceptance  Method: Explanation  Response: Verbalizes Understanding    Education Comments  No comments found.          06/16/25 at 11:44 AM   Prema Cordero OT   Rehab Office: [phone]

## 2025-06-16 NOTE — CARE PLAN
Transitional Care Coordinator Note: Patient discussed with medical team, per medical team patient is medically ready. Discharge dispo: Outpatient Therapy.  ADOD 6/16  Yessi Wright RN  Transitional Care Coordinator

## 2025-06-23 DIAGNOSIS — G50.0 TRIGEMINAL NEURALGIA: ICD-10-CM

## 2025-06-23 RX ORDER — OXCARBAZEPINE 150 MG/1
TABLET, FILM COATED ORAL
Qty: 120 TABLET | Refills: 1 | Status: SHIPPED | OUTPATIENT
Start: 2025-06-23

## 2025-06-24 ENCOUNTER — APPOINTMENT (OUTPATIENT)
Dept: OTOLARYNGOLOGY | Facility: CLINIC | Age: 75
End: 2025-06-24
Payer: MEDICARE

## 2025-06-25 ENCOUNTER — OFFICE VISIT (OUTPATIENT)
Dept: NEUROSURGERY | Facility: HOSPITAL | Age: 75
End: 2025-06-25
Payer: MEDICARE

## 2025-06-25 VITALS
HEART RATE: 57 BPM | RESPIRATION RATE: 20 BRPM | SYSTOLIC BLOOD PRESSURE: 110 MMHG | BODY MASS INDEX: 27.64 KG/M2 | WEIGHT: 221.12 LBS | TEMPERATURE: 97.7 F | OXYGEN SATURATION: 98 % | DIASTOLIC BLOOD PRESSURE: 63 MMHG

## 2025-06-25 DIAGNOSIS — G50.0 TRIGEMINAL NEURALGIA: Primary | ICD-10-CM

## 2025-06-25 PROCEDURE — 99211 OFF/OP EST MAY X REQ PHY/QHP: CPT | Performed by: NEUROLOGICAL SURGERY

## 2025-06-25 PROCEDURE — 3078F DIAST BP <80 MM HG: CPT | Performed by: NEUROLOGICAL SURGERY

## 2025-06-25 PROCEDURE — 3074F SYST BP LT 130 MM HG: CPT | Performed by: NEUROLOGICAL SURGERY

## 2025-06-25 PROCEDURE — 1111F DSCHRG MED/CURRENT MED MERGE: CPT | Performed by: NEUROLOGICAL SURGERY

## 2025-06-25 PROCEDURE — 1036F TOBACCO NON-USER: CPT | Performed by: NEUROLOGICAL SURGERY

## 2025-06-25 PROCEDURE — 1125F AMNT PAIN NOTED PAIN PRSNT: CPT | Performed by: NEUROLOGICAL SURGERY

## 2025-06-25 ASSESSMENT — PAIN SCALES - GENERAL: PAINLEVEL_OUTOF10: 7

## 2025-06-25 NOTE — PROGRESS NOTES
"[facility]   Neurosurgery    Diagnosis  Adebayo \"Ed\" was seen today for follow-up.  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  He had previously undergone gamma knife radiosurgery in 2021 with good pain relief, although his pain recurred over time.    As such, he was brought to the operating room on June 13 for a right-sided microvascular decompression.  Intraoperatively, there was severe compression from the basilar artery and the nerve appeared atrophied.  This basically required transposition of the nerve superiorly with circumferential Pa decompression.  This procedure was uncomplicated.    In follow-up, he has had complete relief of his typical trigeminal pain.  He does have persistent numbness in the right perioral region extending into the cheek.  He has bitten his tongue a couple times and not noticed it.  But otherwise, he is able to eat without the pain he would previously have.    He does describe some neck pain and headaches, likely as a result of pinning and positioning.  This is expected to improved.  He also describes imbalance and memory issues, which have previously been attributed to the pain medications.  He is on a weaning schedule with Dr. José over the next few weeks.    Otherwise, they may contact us on an as-needed basis.      History of Present Illness  Chief Complaint:   Chief Complaint   Patient presents with    Follow-up         HPI: Adebayo Puentes is a 74 y.o. male with hx of HTN, HLD, GERD s/p Nissen (1980s) and dilation (2024), BPH s/p TERP (2020), CKD, prior MI on asa, Trigeminal Neuralgia s/p SRS p/w refractory pain. He underwent a Right Microvascular Decompression on 6/13.  Patient was discharged home with outpatient therapy orders. He presents today for his post op visit for evaluation of wound " and discuss progress since surgery.       ROS: As noted in HPI.      Previous History  Medical History[1]  Surgical History[2]  Social History[3]  Family History[4]  Allergies[5]  Current Outpatient Medications   Medication Instructions    amLODIPine (NORVASC) 10 mg, oral, Daily    ascorbic acid, vitamin C, 1,000 mg ER tablet 1 tablet, oral, Daily    aspirin 81 mg, oral, Daily, Do not start Aspirin until 6/18/2025.    atenolol (TENORMIN) 25 mg, oral, Daily    atorvastatin (LIPITOR) 40 mg, oral, Daily    calcium carbonate-vitamin D3 (Caltrate with Vitamin D3) 600 mg-20 mcg (800 unit) tablet 1 tablet, oral, Daily    cholecalciferol (Vitamin D3) 5,000 Units tablet 1 tablet, oral, Daily    cyclobenzaprine (FLEXERIL) 10 mg, oral, 3 times daily PRN    escitalopram (LEXAPRO) 20 mg, oral, Daily    esomeprazole (NEXIUM) 20 mg, 2 times daily    gabapentin (NEURONTIN) 200 mg, oral, 2 times daily    losartan-hydrochlorothiazide (Hyzaar) 100-25 mg tablet 1 tablet, oral, Daily    montelukast (SINGULAIR) 10 mg, oral, Daily    multivitamin with minerals iron-free (Centrum Silver) 1 tablet, oral, Daily    Myrbetriq 50 mg, oral, Daily    OXcarbazepine (Trileptal) 150 mg tablet Decrease to 450 mg  ( 3tabs) BID x 1 week then 300 mg ( 2 tabs) BID x 1 week then 150 mg bid x 1 week then stop    traZODone (DESYREL) 100 mg, oral, Nightly         Vitals  /63 (BP Location: Right arm, Patient Position: Sitting, BP Cuff Size: Adult)   Pulse 57   Temp 36.5 °C (97.7 °F) (Temporal)   Resp 20   Wt 100 kg (221 lb 1.9 oz)   SpO2 98%   BMI 27.64 kg/m²       Physical Exam  The incision was inspected, this was well-healed.  The sutures were removed without complication.         [1]   Past Medical History:  Diagnosis Date    Abnormal findings on diagnostic imaging of other parts of musculoskeletal system 09/21/2016    Abnormal bone radiograph    [place] coronary artery calcium score less than 100     4/1/25--coronary artery calcium score of  40.9,    Arthritis     left wrist    BPH (benign prostatic hyperplasia)     s/p TURP 07/2020 and PVP 05/05/2022    CKD (chronic kidney disease)     seen by Hamzah Chino CNP 05/09/25    Coronary artery disease     Cutaneous abscess of neck 09/23/2021    Neck abscess    Cyst of kidney, acquired 12/01/2015    Renal cyst    Depression     GERD (gastroesophageal reflux disease)     s/p Nissen fundoplication in 1986 s/p esophagoscopy and dilation to 32mm on 10/17/24    Hearing aid worn     bilateral    HL (hearing loss)     Hyperlipidemia     Hypertension     Nephrolithiasis     Old myocardial infarction     myocardial infarction-2000    Personal history of other diseases of the digestive system     History of hiatal hernia    Personal history of other mental and behavioral disorders     History of depression    Personal history of other specified conditions     History of chest pain    Prostate cancer (Multi)     Kd 6 prostate cancer on active surveillance    Shortness of breath     Trigeminal neuralgia     seen by Dr. Rohit Riddle 04/23/25    Vision loss    [2]   Past Surgical History:  Procedure Laterality Date    CARDIAC CATHETERIZATION      COLONOSCOPY      ESOPHAGOGASTRODUODENOSCOPY      GASTRIC FUNDOPLICATION  02/25/2015    Esophagogastric Fundoplasty Nissen Fundoplication    HERNIA REPAIR      HIP ARTHROPLASTY Right     OTHER SURGICAL HISTORY  06/11/2013    Wrist Surgery    OTHER SURGICAL HISTORY  09/23/2021    Neck surgery    OTHER SURGICAL HISTORY  09/23/2021    Transurethral resection of prostate    PROSTATE SURGERY      ROTATOR CUFF REPAIR  06/11/2013    Rotator Cuff Repair    US GUIDED ABSCESS DRAIN  02/10/2017    US GUIDED ABSCESS DRAIN 2/10/2017 [facility] CLINICAL LEGACY    US GUIDED PERCUTANEOUS PERITONEAL OR RETROPERITONEAL FLUID COLLECTION DRAINAGE  02/10/2017    US GUIDED PERCUTANEOUS PERITONEAL OR RETROPERITONEAL FLUID COLLECTION DRAINAGE 2/10/2017 [facility] CLINICAL LEGACY   [3]   Social History  Tobacco  Use    Smoking status: Never     Passive exposure: Never    Smokeless tobacco: Never   Vaping Use    Vaping status: Never Used   Substance Use Topics    Alcohol use: Yes     Comment: social 3-4 per year    Drug use: Never   [4]   Family History  Problem Relation Name Age of Onset    Other (heart trouble) Mother      Kidney disease Father      Heart disease Father      Other (hypoglycemia) Father      Other (heart trouble) Father      Liver cancer Paternal Grandfather     [5] No Known Allergies

## 2025-07-01 ENCOUNTER — APPOINTMENT (OUTPATIENT)
Dept: PHYSICAL THERAPY | Facility: CLINIC | Age: 75
End: 2025-07-01
Payer: MEDICARE

## 2025-07-09 ENCOUNTER — PATIENT MESSAGE (OUTPATIENT)
Dept: PRIMARY CARE | Facility: CLINIC | Age: 75
End: 2025-07-09
Payer: MEDICARE

## 2025-07-09 DIAGNOSIS — K22.70 BARRETT'S ESOPHAGUS WITHOUT DYSPLASIA: Primary | ICD-10-CM

## 2025-07-09 DIAGNOSIS — E78.5 HYPERLIPIDEMIA, UNSPECIFIED HYPERLIPIDEMIA TYPE: ICD-10-CM

## 2025-07-09 DIAGNOSIS — R39.15 URINARY URGENCY: ICD-10-CM

## 2025-07-09 DIAGNOSIS — T78.40XS ALLERGY, SEQUELA: ICD-10-CM

## 2025-07-09 DIAGNOSIS — I10 HYPERTENSION, UNSPECIFIED TYPE: ICD-10-CM

## 2025-07-09 DIAGNOSIS — F41.9 ANXIETY: ICD-10-CM

## 2025-07-10 DIAGNOSIS — N52.9 ERECTILE DYSFUNCTION, UNSPECIFIED ERECTILE DYSFUNCTION TYPE: ICD-10-CM

## 2025-07-10 RX ORDER — TADALAFIL 10 MG/1
10 TABLET ORAL
Qty: 30 TABLET | Refills: 3 | Status: SHIPPED | OUTPATIENT
Start: 2025-07-10 | End: 2026-07-10

## 2025-07-14 ENCOUNTER — OFFICE VISIT (OUTPATIENT)
Dept: ORTHOPEDIC SURGERY | Facility: HOSPITAL | Age: 75
End: 2025-07-14
Payer: MEDICARE

## 2025-07-14 ENCOUNTER — HOSPITAL ENCOUNTER (OUTPATIENT)
Dept: RADIOLOGY | Facility: HOSPITAL | Age: 75
Discharge: HOME | End: 2025-07-14
Payer: MEDICARE

## 2025-07-14 VITALS — BODY MASS INDEX: 27.35 KG/M2 | HEIGHT: 75 IN | WEIGHT: 220 LBS

## 2025-07-14 DIAGNOSIS — M25.512 LEFT SHOULDER PAIN, UNSPECIFIED CHRONICITY: ICD-10-CM

## 2025-07-14 DIAGNOSIS — R39.15 URINARY URGENCY: ICD-10-CM

## 2025-07-14 PROCEDURE — 2500000004 HC RX 250 GENERAL PHARMACY W/ HCPCS (ALT 636 FOR OP/ED): Performed by: ORTHOPAEDIC SURGERY

## 2025-07-14 PROCEDURE — 73030 X-RAY EXAM OF SHOULDER: CPT | Mod: LT

## 2025-07-14 PROCEDURE — 1111F DSCHRG MED/CURRENT MED MERGE: CPT | Performed by: ORTHOPAEDIC SURGERY

## 2025-07-14 PROCEDURE — 1159F MED LIST DOCD IN RCRD: CPT | Performed by: ORTHOPAEDIC SURGERY

## 2025-07-14 PROCEDURE — 1125F AMNT PAIN NOTED PAIN PRSNT: CPT | Performed by: ORTHOPAEDIC SURGERY

## 2025-07-14 PROCEDURE — 99213 OFFICE O/P EST LOW 20 MIN: CPT | Mod: 25 | Performed by: ORTHOPAEDIC SURGERY

## 2025-07-14 PROCEDURE — 73030 X-RAY EXAM OF SHOULDER: CPT | Mod: LEFT SIDE | Performed by: RADIOLOGY

## 2025-07-14 PROCEDURE — 20610 DRAIN/INJ JOINT/BURSA W/O US: CPT | Mod: LT | Performed by: ORTHOPAEDIC SURGERY

## 2025-07-14 PROCEDURE — 99214 OFFICE O/P EST MOD 30 MIN: CPT | Performed by: ORTHOPAEDIC SURGERY

## 2025-07-14 RX ORDER — ESCITALOPRAM OXALATE 20 MG/1
20 TABLET ORAL DAILY
Qty: 90 TABLET | Refills: 3 | Status: SHIPPED | OUTPATIENT
Start: 2025-07-14 | End: 2026-07-14

## 2025-07-14 RX ORDER — MONTELUKAST SODIUM 10 MG/1
10 TABLET ORAL DAILY
Qty: 90 TABLET | Refills: 3 | Status: SHIPPED | OUTPATIENT
Start: 2025-07-14 | End: 2026-07-14

## 2025-07-14 RX ORDER — TRIAMCINOLONE ACETONIDE 40 MG/ML
40 INJECTION, SUSPENSION INTRA-ARTICULAR; INTRAMUSCULAR
Status: COMPLETED | OUTPATIENT
Start: 2025-07-14 | End: 2025-07-14

## 2025-07-14 RX ORDER — ATENOLOL 25 MG/1
25 TABLET ORAL DAILY
Qty: 90 TABLET | Refills: 3 | Status: SHIPPED | OUTPATIENT
Start: 2025-07-14 | End: 2026-07-14

## 2025-07-14 RX ORDER — AMLODIPINE BESYLATE 10 MG/1
10 TABLET ORAL DAILY
Qty: 90 TABLET | Refills: 3 | Status: SHIPPED | OUTPATIENT
Start: 2025-07-14 | End: 2026-07-14

## 2025-07-14 RX ORDER — ATORVASTATIN CALCIUM 40 MG/1
40 TABLET, FILM COATED ORAL DAILY
Qty: 90 TABLET | Refills: 3 | Status: SHIPPED | OUTPATIENT
Start: 2025-07-14 | End: 2026-07-14

## 2025-07-14 RX ORDER — LOSARTAN POTASSIUM AND HYDROCHLOROTHIAZIDE 25; 100 MG/1; MG/1
1 TABLET ORAL DAILY
Qty: 90 TABLET | Refills: 3 | Status: SHIPPED | OUTPATIENT
Start: 2025-07-14 | End: 2026-07-09

## 2025-07-14 RX ORDER — TRAZODONE HYDROCHLORIDE 100 MG/1
100 TABLET ORAL NIGHTLY
Qty: 90 TABLET | Refills: 3 | Status: SHIPPED | OUTPATIENT
Start: 2025-07-14 | End: 2026-07-14

## 2025-07-14 RX ORDER — HYDROGEN PEROXIDE 3 %
20 SOLUTION, NON-ORAL MISCELLANEOUS 2 TIMES DAILY
Qty: 180 CAPSULE | Refills: 3 | Status: SHIPPED | OUTPATIENT
Start: 2025-07-14 | End: 2026-07-14

## 2025-07-14 RX ORDER — MIRABEGRON 50 MG/1
50 TABLET, FILM COATED, EXTENDED RELEASE ORAL DAILY
Qty: 30 TABLET | Refills: 11 | Status: SHIPPED | OUTPATIENT
Start: 2025-07-14 | End: 2026-07-14

## 2025-07-14 RX ORDER — LIDOCAINE HYDROCHLORIDE 10 MG/ML
4 INJECTION, SOLUTION INFILTRATION; PERINEURAL
Status: COMPLETED | OUTPATIENT
Start: 2025-07-14 | End: 2025-07-14

## 2025-07-14 RX ORDER — MIRABEGRON 50 MG/1
50 TABLET, FILM COATED, EXTENDED RELEASE ORAL DAILY
Qty: 90 TABLET | Refills: 3 | Status: CANCELLED | OUTPATIENT
Start: 2025-07-14 | End: 2026-07-14

## 2025-07-14 RX ADMIN — TRIAMCINOLONE ACETONIDE 40 MG: 400 INJECTION, SUSPENSION INTRA-ARTICULAR; INTRAMUSCULAR at 13:30

## 2025-07-14 RX ADMIN — LIDOCAINE HYDROCHLORIDE 4 ML: 10 INJECTION, SOLUTION INFILTRATION; PERINEURAL at 13:30

## 2025-07-14 ASSESSMENT — PAIN - FUNCTIONAL ASSESSMENT: PAIN_FUNCTIONAL_ASSESSMENT: 0-10

## 2025-07-14 ASSESSMENT — PAIN SCALES - GENERAL: PAINLEVEL_OUTOF10: 8

## 2025-07-14 NOTE — PROGRESS NOTES
Mr. Puentes is here evaluation of his left shoulder his left shoulder pain with elevation disruptive of his sleep pain is over the deltoid.  He has described a history of possible prior surgery for rotator cuff on that side.  He has not had any new or recent injuries to the left shoulder.    The patient is pleasant and cooperative.  The patient is alert and oriented ×3.  Auditory function is intact.  The patient is a good historian.  The patient is not in acute distress.  Eye exam significant for nonicteric sclera, intact ocular muscle movement.  Breathing is rhythmic symmetric and nonlabored.  Left shoulder integument is intact with exception of 2 longitudinal scars in the posterior aspect of the shoulder that do not appear consistent with arthroscopic scars there is no other integument abnormality radial pulses easily palpable brisk capillary refill light touch sensation in the left hand is intact  strength is 5/5 motor power in abduction 5 external rotation 5 internal rotation 5 the patient does have painful arc and pain within 120 and 80 degrees of elevation.  No apprehension.  Radiographs demonstrate preservation subacromial glenohumeral space no obvious arthritic degenerative changes of the glenohumeral joint.  Left shoulder pain patient has left shoulder pain likely subacromial origin options for treatment discussed recommended injection injection performed tolerated well.  Patient will call next week to report the results if he does not get relief would recommend an MRI.                This was dictated using voice recognition software and not corrected for grammatical or spelling errors.    L Inj/Asp: L subacromial bursa on 7/14/2025 1:30 PM  Indications: pain  Details: 22 G needle, posterior approach  Medications: 4 mL lidocaine 10 mg/mL (1 %); 40 mg triamcinolone acetonide 40 mg/mL  Outcome: tolerated well, no immediate complications  Procedure, treatment alternatives, risks and benefits explained,  specific risks discussed. Consent was given by the patient. Immediately prior to procedure a time out was called to verify the correct patient, procedure, equipment, support staff and site/side marked as required.

## 2025-07-15 ENCOUNTER — EVALUATION (OUTPATIENT)
Dept: PHYSICAL THERAPY | Facility: CLINIC | Age: 75
End: 2025-07-15
Payer: MEDICARE

## 2025-07-15 DIAGNOSIS — Z74.09 IMPAIRED MOBILITY AND ENDURANCE: Primary | ICD-10-CM

## 2025-07-15 PROCEDURE — 97162 PT EVAL MOD COMPLEX 30 MIN: CPT | Mod: GP | Performed by: PHYSICAL THERAPIST

## 2025-07-15 PROCEDURE — 97110 THERAPEUTIC EXERCISES: CPT | Mod: GP | Performed by: PHYSICAL THERAPIST

## 2025-07-15 ASSESSMENT — BALANCE ASSESSMENTS
STANDING TO SITTING: ABLE TO STAND WITHOUT USING HANDS AND STABILIZE INDEPENDENTLY
LONG VERSION TOTAL SCORE (MAX 56): 49
REACHING FORWARD WITH OUTSTRETCHED ARM WHILE STANDING: CAN REACH FORWARD CONFIDENTLY 25 CM (10 INCHES)
STANDING TO SITTING: SITS SAFELY WITH MINIMAL USE OF HANDS
STANDING UNSUPPORTED: ABLE TO STAND SAFELY FOR 2 MINUTES
STANDING ON ONE LEG: TRIES TO LIFT LEG UNABLE TO HOLD 3 SECONDS BUT REMAINS STANDING INDEPENDENTLY
PLACE ALTERNATE FOOT ON STEP OR STOOL WHILE STANDING UNSUPPORTED: ABLE TO STAND INDEPENDENTLY AND SAFELY AND COMPLETE 8 STEPS IN 20 SECONDS
STANDING UNSUPPORTED ONE FOOT IN FRONT: LOSES BALANCE WHILE STEPPING OR STANDING
SITTING WITH BACK UNSUPPORTED BUT FEET SUPPORTED ON FLOOR OR ON A STOOL: ABLE TO SIT SAFELY AND SECURELY FOR 2 MINUTES
PICK UP OBJECT FROM THE FLOOR FROM A STANDING POSITION: ABLE TO PICK UP SLIPPER SAFELY AND EASILY
STANDING UNSUPPORTED WITH FEET TOGETHER: ABLE TO PLACE FEET TOGETHER INDEPENDENTLY AND STAND 1 MINUTE SAFELY
PLACE ALTERNATE FOOT ON STEP OR STOOL WHILE STANDING UNSUPPORTED: LOOKS BEHIND FROM BOTH SIDES AND WEIGHT SHIFTS WELL
TRANSFERS: ABLE TO TRANSFER SAFELY WITH MINOR USE OF HANDS
TURN 360 DEGREES: ABLE TO TURN 360 DEGREES SAFELY IN 4 SECONDS OR LESS
STANDING UNSUPPORTED WITH EYES CLOSED: ABLE TO STAND 10 SECONDS SAFELY

## 2025-07-15 ASSESSMENT — PAIN SCALES - GENERAL: PAINLEVEL_OUTOF10: 7

## 2025-07-15 ASSESSMENT — ENCOUNTER SYMPTOMS
OCCASIONAL FEELINGS OF UNSTEADINESS: 0
DEPRESSION: 0
LOSS OF SENSATION IN FEET: 0

## 2025-07-15 NOTE — PROGRESS NOTES
"  Physical Therapy    Physical Therapy Upper Extremity Evaluation    Patient Name: Adebayo Puentes Jr. \"Ed\"  : 1950  MRN: [id number]  Today's Date: 7/15/2025  Time Calculation  Start Time: 0230  Stop Time: 0315  Time Calculation (min): 45 min  PT Evaluation Time Entry  PT Evaluation (Moderate) Time Entry: 35  PT Therapeutic Procedures Time Entry  Therapeutic Exercise Time Entry: 10             Visit Number: 1  Auth: MN    Current Problem  Problem List Items Addressed This Visit           ICD-10-CM    Impaired mobility and endurance - Primary Z74.09    Relevant Orders    Follow Up In Physical Therapy          General  Name and  confirmed with patient on this date.       Ambulatory Screening Summary     Screening  Frequency  Date Last Completed   Spiritual and Cultural Beliefs   Screening  each visit or episode of care 2025   Falls Risk Screening  every ambulatory visit 7/15/2025  5:17 PM   Pain Screening  annually at primary care visit  2025   Domestic Violence screening  annually at primary care visit 2025   Elder Abuse Screening  annually at primary care visit    Depression Screening  annually in the primary care setting 2025   Suicide Risk Screening  annually in the primary care setting 2025   Nutrition and Food Insecurity   Screening  at least annually at primary care visit  2025   Key Learner  annually in the primary care setting 2025   Drug Screen  2025  8:41 AM   Alcohol Screen  2025  8:41 AM   Advance Directive  2025         Precautions  Precautions  STEADI Fall Risk Score (The score of 4 or more indicates an increased risk of falling): 0       Pain  0-10 (Numeric) Pain Score: 7  Pain Location:  (scapula)  Pain Orientation: Left    SUBJECTIVE:   Chief complaint:  Pt is a 73 yo male referred to PT s/p microvascular decompression for right sided trigeminal neuralgia on 25. Pt reports his pain has improved since surgery, but he continues to have some " "numbness in the right perioral region extending into the cheek as well as some headaches and neck pain. Pt has also noticed some imbalance, but feels that it is improving. Neck pain has improved significantly. Headaches every couple days. Is having left shoulder pain and had cortisone injection yesterday. Has had both RC repaired in the past. Also notices weakness and paresthesias in left UE. Left UE tremors at times when performing activities.    Prior level of function: no restrictions  Work: retired  Patients goal: Decrease left shoulder pain, increase left UE strength and improve balance    OBJECTIVE:  Cervical ROM  Cervical Right (Degrees) Left (Degrees)   Rotation 60 60   Sidebend 16 pain 20 pain left scapual   Flexion 42 pain left scapula   Extension 35 pain left scapula      Upper Extremity ROM: WNL unless documented below:  ROM in Degrees RIGHT LEFT   Shoulder Flexion  165   Shoulder Abduction  145 pain   Shoulder ER     Shoulder IR     Elbow Flexion     Elbow Extension     Wrist Flexion     Wrist Extension       Upper Extremity Strength:  MMT (5/5 unless noted) RIGHT LEFT   Shoulder Flexion  4+/5   Shoulder Abduction  4+   Shoulder ER  4+   Shoulder IR  4+   Elbow Flexion     Elbow Extension     Wrist Flexion     Wrist Extension     Repeated cervical retraction: decreased scapular pain  Posture: forward head and rounded shoulders  Palpation: tender left UT and thoracic paraspinal  Neurological symptoms: paresthesias left UE    Special tests: empty can: mild pain    Chaudhari Balance Score: 49/56    Balance: tandem stand: right:  3\"       left: 2\"  SLSB: right;  2\"          left: 2\"      TREATMENT:  EXERCISES Date 7/15/25 Date Date Date    REPS REPS REPS REPS          Cervical retraction seated 10X      Scapular retraction 10X      Mid row       Pull down       Shoulder IR with band       \"No money\" with band       Bicep curl                            BALANCE       Tandem stand L 2\", R 3\"                        "           Nustep                                                                                           [facility]          Access Code: [email or web address]  URL: [email or web address]  Date: 07/15/2025  Prepared by: Ryanne Jaramillo    Exercises  - Seated Cervical Retraction  - 2-3 x daily - 7 x weekly - 10 reps  - Seated Scapular Resetting  - 1 x daily - 7 x weekly - 10 reps  - Tandem Stance  - 1 x daily - 7 x weekly - 3-5 reps - 30 hold      ASSESSEMENT  Pt is a 74 y.o. referred to physical therapy for a dx of impaired mobility and endurance by [place] A* . Pt with signs and symptoms of pain, loss of motion, loss of strength, reduced function and reduced posture. This pt would benefit from a therapy program to restore prior level of function, reduce pain, increase AROM, increase strength, and improve posture.    The physical therapy prognosis is good for the patient to achieve their goals.   The pt tolerated therapy treatment today well with no adverse effects.  Barriers to therapy include:  none    PLAN   2X/week X 4-8 weeks for strengthening, balance activities, cervical ROM, postural strengthening.    The patient will benefit from physical therapy treatment to include:  therapeutic exercise, manual therapy,gait training.    Goals: STG's  Correct forward head posture to decrease stress on cervical spine-3-4 weeks  Decrease left scapular pain by 50%-3-4 weeks  Improve cervical ROM by 10 degrees-3-4 weeks    LTG's  Pt will have 5/5 left UE strength in all planes to faciliate return to prior level of function-8 weeks  Improve Chaudhari Balance score to at least 51/56 to decrease risk of falls-8 weeks  Pt will be able ambulate without device for community distances including uneven surfaces-8 weeks  Pt will have at least 20 second tandem balance-8 weeks

## 2025-07-30 ENCOUNTER — APPOINTMENT (OUTPATIENT)
Dept: OTOLARYNGOLOGY | Facility: HOSPITAL | Age: 75
End: 2025-07-30
Payer: MEDICARE

## 2025-08-04 ENCOUNTER — TELEPHONE (OUTPATIENT)
Dept: NEUROLOGY | Facility: CLINIC | Age: 75
End: 2025-08-04
Payer: MEDICARE

## 2025-08-05 ENCOUNTER — TREATMENT (OUTPATIENT)
Dept: PHYSICAL THERAPY | Facility: CLINIC | Age: 75
End: 2025-08-05
Payer: MEDICARE

## 2025-08-05 DIAGNOSIS — Z74.09 IMPAIRED MOBILITY AND ENDURANCE: Primary | ICD-10-CM

## 2025-08-05 PROCEDURE — 97110 THERAPEUTIC EXERCISES: CPT | Mod: GP,CQ

## 2025-08-05 ASSESSMENT — PAIN SCALES - GENERAL: PAINLEVEL_OUTOF10: 5 - MODERATE PAIN

## 2025-08-05 NOTE — PROGRESS NOTES
"Physical Therapy    Physical Therapy Treatment    Patient Name: Adebayo Puentes Jr.  MRN: [id number]  Today's Date: 8/5/2025    Time Entry:   Time Calculation  Start Time: 0845  Stop Time: 0930  Time Calculation (min): 45 min     PT Therapeutic Procedures Time Entry  Therapeutic Exercise Time Entry: 45               Assessment:   Verbal cueing needed to correct technique with his home exercises. No increased left shoulder/scapula pain throughout his treatment.   Pt is challenged with balance exercises but tandem has improved since the IE.    Plan:   Assess tolerance of today's visit.    Current Problem  1. Impaired mobility and endurance  Follow Up In Physical Therapy              Subjective    Pt reports that he has had less neck pain and headaches the past 2-3 weeks. Left scapula and shoulder pain are worse at night.  Pt is compliant with his HEP.        Precautions   Precautions  STEADI Fall Risk Score (The score of 4 or more indicates an increased risk of falling): 0       Pain  Pain Assessment  0-10 (Numeric) Pain Score: 5 - Moderate pain  Pain Location:  (scapula)  Pain Orientation: Left    Objective   Reviewed home exercises  Initiated flow sheet    Treatments:  EXERCISES Date 7/15/25 Date 8/5/25 Date Date    REPS REPS REPS REPS          Cervical retraction seated 10X 10x5\"H     Scapular retraction 10X 20X 5\"H     Mid row  Blue 20X     Pull down  Blue 20X     Shoulder IR with band  Blue 2X10 R/L     \"No money\" with band  Red 20x     Bicep curl                            BALANCE       Tandem stand L 2\", R 3\" L 6\", R 9\"     WBOS/NBOS foam   30\"H X1 each     SLS                     Nustep  L4 13 min                                                                                         HEP            OP EDUCATION:   Reviewed     Goals:  Correct forward head posture to decrease stress on cervical spine-3-4 weeks  Decrease left scapular pain by 50%-3-4 weeks  Improve cervical ROM by 10 degrees-3-4 weeks    LTG's  Pt " will have 5/5 left UE strength in all planes to faciliate return to prior level of function-8 weeks  Improve Chaudhari Balance score to at least 51/56 to decrease risk of falls-8 weeks  Pt will be able ambulate without device for community distances including uneven surfaces-8 weeks  Pt will have at least 20 second tandem balance-8 weeks

## 2025-08-07 ENCOUNTER — TREATMENT (OUTPATIENT)
Dept: PHYSICAL THERAPY | Facility: CLINIC | Age: 75
End: 2025-08-07
Payer: MEDICARE

## 2025-08-07 DIAGNOSIS — Z74.09 IMPAIRED MOBILITY AND ENDURANCE: Primary | ICD-10-CM

## 2025-08-07 DIAGNOSIS — G50.0 TRIGEMINAL NEURALGIA: Primary | ICD-10-CM

## 2025-08-07 PROCEDURE — 97110 THERAPEUTIC EXERCISES: CPT | Mod: GP,CQ

## 2025-08-07 RX ORDER — GABAPENTIN 300 MG/1
300 CAPSULE ORAL NIGHTLY
Qty: 30 CAPSULE | Refills: 3 | Status: SHIPPED | OUTPATIENT
Start: 2025-08-07 | End: 2026-08-07

## 2025-08-07 ASSESSMENT — PAIN SCALES - GENERAL: PAINLEVEL_OUTOF10: 0 - NO PAIN

## 2025-08-07 ASSESSMENT — PAIN - FUNCTIONAL ASSESSMENT: PAIN_FUNCTIONAL_ASSESSMENT: 0-10

## 2025-08-07 NOTE — PROGRESS NOTES
"Physical Therapy    Physical Therapy Treatment    Patient Name: Adebayo Puentes Jr.  MRN: [id number]  Today's Date: 8/7/2025    Time Entry:   Time Calculation  Start Time: 1015  Stop Time: 1058  Time Calculation (min): 43 min     PT Therapeutic Procedures Time Entry  Therapeutic Exercise Time Entry: 43               Assessment:   Tandem balance is improving.   Verbal cueing needed to correct his technique with T-band exercises.    No increase of neck or scapular pain throughout his treatment.    Plan:   Assess tolerance of today's visit. Progress balance and strengthening exercises as able.    Current Problem  1. Impaired mobility and endurance  Follow Up In Physical Therapy              Subjective    Pt reports that he felt good after his last PT session. No neck pain or headache today.      Precautions   Precautions  STEADI Fall Risk Score (The score of 4 or more indicates an increased risk of falling): 0     Pain  Pain Assessment  Pain Assessment: 0-10  0-10 (Numeric) Pain Score: 0 - No pain  Pain Location: Neck  Pain Orientation: Left    Objective   Left shoulder strength  Flexion 4+/5  Abduction 4+/5  ER 4+/5  IR 4+/5    Treatments:  EXERCISES Date 7/15/25 Date 8/5/25 Date 8/7/25 Date    REPS REPS REPS REPS          Cervical retraction seated 10X 10x5\"H 10x5\"H    Scapular retraction 10X 20X 5\"H HEP    Mid row  Blue 20X Blue 30X    Pull down  Blue 20X Blue 30X    Shoulder IR with band  Blue 2X10 R/L Blue 2X10    \"No money\" with band  Red 20x Red 20x    Bicep curl   Blue 30X                         BALANCE       Tandem stand L 2\", R 3\" L 6\", R 9\" L 7,R 30     WBOS/NBOS foam   30\"H X1 each 30\"H X1    SLS       Alt step tap   6\" 20x ea, no UE           Nustep  L4 13 min L5 10 min           UT stretch       Levator stretch       Cervical rotation stretch                                                               HEP            OP EDUCATION:   Reviewed     Goals:  Correct forward head posture to decrease stress on " cervical spine-3-4 weeks  Decrease left scapular pain by 50%-3-4 weeks  Improve cervical ROM by 10 degrees-3-4 weeks    LTG's  Pt will have 5/5 left UE strength in all planes to faciliate return to prior level of function-8 weeks  Improve Chaudhari Balance score to at least 51/56 to decrease risk of falls-8 weeks  Pt will be able ambulate without device for community distances including uneven surfaces-8 weeks  Pt will have at least 20 second tandem balance-8 weeks

## 2025-08-12 ENCOUNTER — APPOINTMENT (OUTPATIENT)
Dept: ORTHOPEDIC SURGERY | Facility: CLINIC | Age: 75
End: 2025-08-12
Payer: MEDICARE

## 2025-08-12 ENCOUNTER — APPOINTMENT (OUTPATIENT)
Dept: PHYSICAL THERAPY | Facility: CLINIC | Age: 75
End: 2025-08-12
Payer: MEDICARE

## 2025-08-12 ENCOUNTER — DOCUMENTATION (OUTPATIENT)
Dept: PHYSICAL THERAPY | Facility: CLINIC | Age: 75
End: 2025-08-12

## 2025-08-12 DIAGNOSIS — M72.0 DUPUYTREN CONTRACTURE OF RIGHT HAND: Primary | ICD-10-CM

## 2025-08-12 DIAGNOSIS — Z74.09 IMPAIRED MOBILITY AND ENDURANCE: Primary | ICD-10-CM

## 2025-08-12 PROCEDURE — 99214 OFFICE O/P EST MOD 30 MIN: CPT | Performed by: ORTHOPAEDIC SURGERY

## 2025-08-12 PROCEDURE — 1160F RVW MEDS BY RX/DR IN RCRD: CPT | Performed by: ORTHOPAEDIC SURGERY

## 2025-08-12 PROCEDURE — 99212 OFFICE O/P EST SF 10 MIN: CPT | Performed by: ORTHOPAEDIC SURGERY

## 2025-08-12 PROCEDURE — 1036F TOBACCO NON-USER: CPT | Performed by: ORTHOPAEDIC SURGERY

## 2025-08-12 PROCEDURE — 1159F MED LIST DOCD IN RCRD: CPT | Performed by: ORTHOPAEDIC SURGERY

## 2025-08-12 ASSESSMENT — PAIN - FUNCTIONAL ASSESSMENT: PAIN_FUNCTIONAL_ASSESSMENT: NO/DENIES PAIN

## 2025-08-14 ENCOUNTER — TREATMENT (OUTPATIENT)
Dept: PHYSICAL THERAPY | Facility: CLINIC | Age: 75
End: 2025-08-14
Payer: MEDICARE

## 2025-08-14 DIAGNOSIS — Z74.09 IMPAIRED MOBILITY AND ENDURANCE: Primary | ICD-10-CM

## 2025-08-14 PROCEDURE — 97110 THERAPEUTIC EXERCISES: CPT | Mod: GP,CQ

## 2025-08-14 ASSESSMENT — PAIN SCALES - GENERAL: PAINLEVEL_OUTOF10: 0 - NO PAIN

## 2025-08-14 ASSESSMENT — PAIN - FUNCTIONAL ASSESSMENT: PAIN_FUNCTIONAL_ASSESSMENT: 0-10

## 2025-08-19 ENCOUNTER — TREATMENT (OUTPATIENT)
Dept: PHYSICAL THERAPY | Facility: CLINIC | Age: 75
End: 2025-08-19
Payer: MEDICARE

## 2025-08-19 DIAGNOSIS — Z74.09 IMPAIRED MOBILITY AND ENDURANCE: Primary | ICD-10-CM

## 2025-08-19 PROCEDURE — 97110 THERAPEUTIC EXERCISES: CPT | Mod: GP,CQ

## 2025-08-19 ASSESSMENT — PAIN SCALES - GENERAL: PAINLEVEL_OUTOF10: 0 - NO PAIN

## 2025-08-19 ASSESSMENT — PAIN - FUNCTIONAL ASSESSMENT: PAIN_FUNCTIONAL_ASSESSMENT: 0-10

## 2025-08-21 ENCOUNTER — TREATMENT (OUTPATIENT)
Dept: PHYSICAL THERAPY | Facility: CLINIC | Age: 75
End: 2025-08-21
Payer: MEDICARE

## 2025-08-21 DIAGNOSIS — Z74.09 IMPAIRED MOBILITY AND ENDURANCE: Primary | ICD-10-CM

## 2025-08-21 PROCEDURE — 97110 THERAPEUTIC EXERCISES: CPT | Mod: GP,CQ

## 2025-08-21 ASSESSMENT — PAIN SCALES - GENERAL: PAINLEVEL_OUTOF10: 0 - NO PAIN

## 2025-08-21 ASSESSMENT — PAIN - FUNCTIONAL ASSESSMENT: PAIN_FUNCTIONAL_ASSESSMENT: 0-10

## 2025-08-24 PROBLEM — M72.0 DUPUYTREN CONTRACTURE OF RIGHT HAND: Status: ACTIVE | Noted: 2025-08-24

## 2025-08-25 ENCOUNTER — APPOINTMENT (OUTPATIENT)
Dept: PHYSICAL THERAPY | Facility: CLINIC | Age: 75
End: 2025-08-25
Payer: MEDICARE

## 2025-08-25 DIAGNOSIS — Z74.09 IMPAIRED MOBILITY AND ENDURANCE: Primary | ICD-10-CM

## 2025-08-29 ENCOUNTER — TREATMENT (OUTPATIENT)
Dept: PHYSICAL THERAPY | Facility: CLINIC | Age: 75
End: 2025-08-29
Payer: MEDICARE

## 2025-08-29 DIAGNOSIS — Z74.09 IMPAIRED MOBILITY AND ENDURANCE: Primary | ICD-10-CM

## 2025-08-29 PROCEDURE — 97110 THERAPEUTIC EXERCISES: CPT | Mod: GP | Performed by: PHYSICAL THERAPIST

## 2025-08-29 ASSESSMENT — BALANCE ASSESSMENTS
STANDING UNSUPPORTED WITH EYES CLOSED: ABLE TO STAND 10 SECONDS SAFELY
TURN 360 DEGREES: ABLE TO TURN 360 DEGREES SAFELY IN 4 SECONDS OR LESS
STANDING UNSUPPORTED: ABLE TO STAND SAFELY FOR 2 MINUTES
STANDING UNSUPPORTED ONE FOOT IN FRONT: ABLE TO PLACE FOOT TANDEM INDEPENDENTLY AND HOLD 30 SECONDS
STANDING UNSUPPORTED WITH FEET TOGETHER: ABLE TO PLACE FEET TOGETHER INDEPENDENTLY AND STAND 1 MINUTE SAFELY
TRANSFERS: ABLE TO TRANSFER SAFELY WITH MINOR USE OF HANDS
SITTING WITH BACK UNSUPPORTED BUT FEET SUPPORTED ON FLOOR OR ON A STOOL: ABLE TO SIT SAFELY AND SECURELY FOR 2 MINUTES
STANDING TO SITTING: ABLE TO STAND WITHOUT USING HANDS AND STABILIZE INDEPENDENTLY
PLACE ALTERNATE FOOT ON STEP OR STOOL WHILE STANDING UNSUPPORTED: ABLE TO STAND INDEPENDENTLY AND SAFELY AND COMPLETE 8 STEPS IN 20 SECONDS
PLACE ALTERNATE FOOT ON STEP OR STOOL WHILE STANDING UNSUPPORTED: LOOKS BEHIND FROM BOTH SIDES AND WEIGHT SHIFTS WELL
STANDING TO SITTING: SITS SAFELY WITH MINIMAL USE OF HANDS
LONG VERSION TOTAL SCORE (MAX 56): 54
PICK UP OBJECT FROM THE FLOOR FROM A STANDING POSITION: ABLE TO PICK UP SLIPPER SAFELY AND EASILY
REACHING FORWARD WITH OUTSTRETCHED ARM WHILE STANDING: CAN REACH FORWARD CONFIDENTLY 25 CM (10 INCHES)
STANDING ON ONE LEG: ABLE TO LIFT LEG INDEPENDENTLY AND HOLD GREATER THAN OR EQUAL TO 3 SECONDS

## 2025-09-02 ENCOUNTER — TELEPHONE (OUTPATIENT)
Dept: NEUROLOGY | Facility: CLINIC | Age: 75
End: 2025-09-02
Payer: MEDICARE

## 2025-09-02 DIAGNOSIS — G50.0 TRIGEMINAL NEURALGIA: ICD-10-CM

## 2025-09-02 RX ORDER — GABAPENTIN 100 MG/1
300 CAPSULE ORAL NIGHTLY
Qty: 90 CAPSULE | Refills: 1 | Status: SHIPPED | OUTPATIENT
Start: 2025-09-02

## 2025-09-18 ENCOUNTER — APPOINTMENT (OUTPATIENT)
Dept: UROLOGY | Facility: CLINIC | Age: 75
End: 2025-09-18
Payer: COMMERCIAL

## 2025-09-23 ENCOUNTER — APPOINTMENT (OUTPATIENT)
Dept: NEUROLOGY | Facility: CLINIC | Age: 75
End: 2025-09-23
Payer: MEDICARE

## 2025-10-15 ENCOUNTER — APPOINTMENT (OUTPATIENT)
Dept: OTOLARYNGOLOGY | Facility: HOSPITAL | Age: 75
End: 2025-10-15
Payer: MEDICARE

## 2025-10-16 ENCOUNTER — APPOINTMENT (OUTPATIENT)
Dept: UROLOGY | Facility: CLINIC | Age: 75
End: 2025-10-16
Payer: MEDICARE

## 2025-10-23 ENCOUNTER — APPOINTMENT (OUTPATIENT)
Dept: NEUROLOGY | Facility: CLINIC | Age: 75
End: 2025-10-23
Payer: MEDICARE

## 2025-11-14 ENCOUNTER — APPOINTMENT (OUTPATIENT)
Dept: NEPHROLOGY | Facility: CLINIC | Age: 75
End: 2025-11-14
Payer: MEDICARE

## 2025-12-05 ENCOUNTER — APPOINTMENT (OUTPATIENT)
Dept: PRIMARY CARE | Facility: CLINIC | Age: 75
End: 2025-12-05
Payer: MEDICARE

## 2026-01-20 ENCOUNTER — APPOINTMENT (OUTPATIENT)
Dept: NEUROLOGY | Facility: CLINIC | Age: 76
End: 2026-01-20
Payer: MEDICARE

## 2026-05-06 ENCOUNTER — APPOINTMENT (OUTPATIENT)
Dept: UROLOGY | Facility: CLINIC | Age: 76
End: 2026-05-06
Payer: MEDICARE

## (undated) DEVICE — DRAPE, MICROSCOPE, FOR ZEISS 65MM, VARI-LENS II, 52 X 150

## (undated) DEVICE — GOWN, SURGICAL, SIRUS, NON REINFORCED, LARGE

## (undated) DEVICE — DRESSING, TELFA, 3X4

## (undated) DEVICE — PIN, SKULL, MAYFIELD, ADULT

## (undated) DEVICE — 20GA X 15CM, CHIBA-STYLE NEEDLE

## (undated) DEVICE — MANIFOLD, 4 PORT NEPTUNE STANDARD

## (undated) DEVICE — BUR, PERFORATOR, CRANIAL, ACRA-CUT 14/11MM, CDM

## (undated) DEVICE — PAD, GROUNDING, ELECTROSURGICAL, W/9 FT CABLE, POLYHESIVE II, ADULT, LF

## (undated) DEVICE — SUTURE, NUROLON, 4-0, 18 IN, TF, CONTROL RELEASE, MULTIPACK, BLACK

## (undated) DEVICE — SYRINGE, TOOMEY, IRRIGATION, 60ML, INDIVIDUAL WRAP, STERILE

## (undated) DEVICE — RETRACTOR, HOOK, FISH, NEURO

## (undated) DEVICE — PLEDGET, PTFE, FIRM, 3/8 X 3/16 X 1/6 IN, MULTIPACK

## (undated) DEVICE — TUBING, SMOKE EVAC, 3/8 X 10 FT

## (undated) DEVICE — BUR, STRAIGHT ROUTER

## (undated) DEVICE — PROTECTOR, NERVE, ULNAR, PINK

## (undated) DEVICE — DRAPE, SHEET, UTILITY, NON ABSORBENT, 18 X 26 IN, LF

## (undated) DEVICE — Device

## (undated) DEVICE — SYRINGE, 60 CC, IRRIGATION, BULB, CONTRO-BULB, PAPER POUCH

## (undated) DEVICE — PRECISIONPOINT

## (undated) DEVICE — CATHETER TRAY, SURESTEP, 16FR, URINE METER W/STATLOCK

## (undated) DEVICE — BLADE, OPHTHALMIC, MINI, STRAIGHT, DOUBLE BEVEL, SHARP ALL AROUND

## (undated) DEVICE — SEALANT, HEMOSTATIC, FLOSEAL, 10 ML

## (undated) DEVICE — BLANKET, LOWER BODY, VHA PLUS, ADULT

## (undated) DEVICE — SPHERE, STEALTHSTATION, 5-PK

## (undated) DEVICE — COVER, CART, 45 X 27 X 48 IN, CLEAR

## (undated) DEVICE — APPLICATOR, PREP, CHLORAPREP, W/ORANGE TINT, 10.5ML

## (undated) DEVICE — STAY SET, SURGICAL, 12MM BLUNT HOOK, 8/PK

## (undated) DEVICE — BANDAGE, GAUZE, CONFORMING, KERLIX, 6 PLY, 4.5 IN X 4.1 YD

## (undated) DEVICE — BUR, ROUTER BIT, FA2, TAPERED, 2.3MM

## (undated) DEVICE — SOLUTION, IRRIGATION, X RX SODIUM CHL 0.9%, 1000ML BTL

## (undated) DEVICE — DEVICE, ADHERUS, AUTOSPRAY, ET DURAL SEALANT, NO TIP

## (undated) DEVICE — GLOVE, SURGICAL, PROTEXIS PI , 7.5, PF, LF

## (undated) DEVICE — PITCHER, GRADUATE, 32 OZ (1200CC), STERILE

## (undated) DEVICE — REST, HEAD, BAGEL, 9 IN

## (undated) DEVICE — TAPE, SILK, DURAPORE, 3 IN X 10 YD, LF

## (undated) DEVICE — COVER, TABLE, UHC

## (undated) DEVICE — SUTURE, MONOCRYL, 4-0, 18 IN, PS2, UNDYED

## (undated) DEVICE — MARKER, SKIN, RULER AND LABEL PACK, CUSTOM

## (undated) DEVICE — DRAPE PACK, CRANIOTOMY, CUSTOM, UHC

## (undated) DEVICE — APPLICATOR, CHLORAPREP, W/ORANGE TINT, 26ML

## (undated) DEVICE — BAG, PLASTIC, 10 X 17 IN

## (undated) DEVICE — NEEDLE, BIOPSY, MAX CORE, 18G

## (undated) DEVICE — DRESSING, GAUZE, PETROLATUM, PATCH, XEROFORM, 1 X 8 IN, STERILE

## (undated) DEVICE — SPONGE, HEMOSTATIC, GELATIN, SURGIFOAM, 8 X 12.5 CM X 10 MM